# Patient Record
Sex: FEMALE | Race: WHITE | NOT HISPANIC OR LATINO | Employment: FULL TIME | URBAN - METROPOLITAN AREA
[De-identification: names, ages, dates, MRNs, and addresses within clinical notes are randomized per-mention and may not be internally consistent; named-entity substitution may affect disease eponyms.]

---

## 2017-02-06 ENCOUNTER — DOCUMENTATION ONLY (OUTPATIENT)
Dept: FAMILY MEDICINE | Facility: CLINIC | Age: 57
End: 2017-02-06

## 2017-02-06 NOTE — PROGRESS NOTES
Pre-Visit Chart Review  For Appointment Scheduled on 2/7/17    Health Maintenance Due   Topic Date Due    Colonoscopy  08/22/2008    Pap Smear  03/05/2015    Influenza Vaccine  08/01/2016

## 2017-02-07 ENCOUNTER — OFFICE VISIT (OUTPATIENT)
Dept: FAMILY MEDICINE | Facility: CLINIC | Age: 57
End: 2017-02-07
Payer: COMMERCIAL

## 2017-02-07 VITALS
DIASTOLIC BLOOD PRESSURE: 65 MMHG | BODY MASS INDEX: 27.18 KG/M2 | HEIGHT: 64 IN | WEIGHT: 159.19 LBS | SYSTOLIC BLOOD PRESSURE: 109 MMHG | HEART RATE: 79 BPM | TEMPERATURE: 99 F | OXYGEN SATURATION: 98 %

## 2017-02-07 DIAGNOSIS — J02.9 PHARYNGITIS, UNSPECIFIED ETIOLOGY: Primary | ICD-10-CM

## 2017-02-07 LAB
CTP QC/QA: YES
S PYO RRNA THROAT QL PROBE: NEGATIVE

## 2017-02-07 PROCEDURE — 96372 THER/PROPH/DIAG INJ SC/IM: CPT | Mod: S$GLB,,, | Performed by: FAMILY MEDICINE

## 2017-02-07 PROCEDURE — 99213 OFFICE O/P EST LOW 20 MIN: CPT | Mod: 25,S$GLB,, | Performed by: PHYSICIAN ASSISTANT

## 2017-02-07 PROCEDURE — 99999 PR PBB SHADOW E&M-EST. PATIENT-LVL III: CPT | Mod: PBBFAC,,, | Performed by: PHYSICIAN ASSISTANT

## 2017-02-07 RX ORDER — AMOXICILLIN AND CLAVULANATE POTASSIUM 400; 57 MG/5ML; MG/5ML
POWDER, FOR SUSPENSION ORAL
Qty: 200 ML | Refills: 0 | Status: SHIPPED | OUTPATIENT
Start: 2017-02-07 | End: 2017-03-31

## 2017-02-07 RX ORDER — BETAMETHASONE SODIUM PHOSPHATE AND BETAMETHASONE ACETATE 3; 3 MG/ML; MG/ML
6 INJECTION, SUSPENSION INTRA-ARTICULAR; INTRALESIONAL; INTRAMUSCULAR; SOFT TISSUE
Status: COMPLETED | OUTPATIENT
Start: 2017-02-07 | End: 2017-02-07

## 2017-02-07 RX ORDER — AMOXICILLIN AND CLAVULANATE POTASSIUM 875; 125 MG/1; MG/1
1 TABLET, FILM COATED ORAL EVERY 12 HOURS
Qty: 20 TABLET | Refills: 0 | Status: SHIPPED | OUTPATIENT
Start: 2017-02-07 | End: 2017-02-07

## 2017-02-07 RX ADMIN — BETAMETHASONE SODIUM PHOSPHATE AND BETAMETHASONE ACETATE 6 MG: 3; 3 INJECTION, SUSPENSION INTRA-ARTICULAR; INTRALESIONAL; INTRAMUSCULAR; SOFT TISSUE at 11:02

## 2017-02-07 NOTE — PROGRESS NOTES
Subjective:       Patient ID: Lexi Chaney is a 56 y.o. female.    Chief Complaint: sore throat, fever, head congestion    Sore Throat    This is a new problem. The current episode started in the past 7 days. The maximum temperature recorded prior to her arrival was 100.4 - 100.9 F. Associated symptoms include congestion, headaches, a plugged ear sensation, swollen glands and trouble swallowing. Pertinent negatives include no coughing, diarrhea, ear discharge, ear pain, hoarse voice, neck pain, shortness of breath, stridor or vomiting. She has had no exposure to strep or mono. She has tried NSAIDs and acetaminophen for the symptoms. The treatment provided mild relief.     Review of Systems   HENT: Positive for congestion, postnasal drip, sinus pressure, sore throat and trouble swallowing. Negative for ear discharge, ear pain, hoarse voice, rhinorrhea and tinnitus.    Respiratory: Negative for cough, shortness of breath, wheezing and stridor.    Cardiovascular: Negative for chest pain and palpitations.   Gastrointestinal: Negative for diarrhea and vomiting.   Musculoskeletal: Negative for neck pain.   Neurological: Positive for headaches.       Objective:      Physical Exam   Constitutional: Vital signs are normal. She appears well-developed and well-nourished. No distress.   HENT:   Head: Normocephalic and atraumatic.   Right Ear: Hearing, external ear and ear canal normal. Tympanic membrane is bulging. Tympanic membrane mobility is abnormal.   Left Ear: Hearing, external ear and ear canal normal. Tympanic membrane is bulging. Tympanic membrane mobility is abnormal.   Mouth/Throat: Uvula is midline and mucous membranes are normal. Posterior oropharyngeal erythema (mild) present. No oropharyngeal exudate, posterior oropharyngeal edema or tonsillar abscesses.   Cardiovascular: Normal rate, regular rhythm, S1 normal, S2 normal and normal heart sounds.  Exam reveals no gallop.    No murmur heard.  Pulses:        Radial pulses are 2+ on the right side, and 2+ on the left side.   <2sec cap refill fingers bilat     Pulmonary/Chest: Effort normal and breath sounds normal. No respiratory distress. She has no wheezes. She has no rhonchi.   Lymphadenopathy:        Head (right side): Tonsillar adenopathy present. No submental, no submandibular, no preauricular, no posterior auricular and no occipital adenopathy present.        Head (left side): Tonsillar adenopathy present. No submental, no submandibular, no preauricular, no posterior auricular and no occipital adenopathy present.   Skin: Skin is warm and dry. She is not diaphoretic.   Appropriate skin turgor   Psychiatric: She has a normal mood and affect. Her speech is normal and behavior is normal. Judgment and thought content normal. Cognition and memory are normal.       Assessment:       1. Pharyngitis, unspecified etiology        Plan:   Lexi CURTIS was seen today for sore throat, fever, head congestion.    Diagnoses and all orders for this visit:    Pharyngitis, unspecified etiology  -     POCT Rapid Strep A  -     betamethasone acetate-betamethasone sodium phosphate injection 6 mg; Inject 1 mL (6 mg total) into the muscle one time.  -     Discontinue: amoxicillin-clavulanate 875-125mg (AUGMENTIN) 875-125 mg per tablet; Take 1 tablet by mouth every 12 (twelve) hours. Take with food or milk.  -     amoxicillin-clavulanate (AUGMENTIN) 400-57 mg/5 mL SusR; Take 10mL BID x 10 days  Take antibiotics with food.  Increase fluid intake.    Call the clinic if symptoms worsen, new symptoms develop or if you are not any better after completion of your antibiotics.

## 2017-02-07 NOTE — MR AVS SNAPSHOT
[facility]  [address]  [place] [address]  Phone: [phone]  Fax: [phone]                  Lexi Chaney   2017 11:00 AM   Office Visit    Description:  Female : 1960   Provider:  JENNY Cline   Department:  [facility]           Reason for Visit     sore throat, fever, head congestion           Diagnoses this Visit        Comments    Pharyngitis, unspecified etiology    -  Primary            To Do List           Future Appointments        Provider Department Dept Phone    2017 8:30 AM Rajiv Coker DO [facility] [phone]      Goals (5 Years of Data)     None       These Medications        Disp Refills Start End    amoxicillin-clavulanate (AUGMENTIN) 400-57 mg/5 mL SusR 200 mL 0 2017     Take 10mL BID x 10 days    Pharmacy: [facility] [address] [place] [address] AT [facility] Ph #: [phone]       Notes to Pharmacy: Cancel Augmentin tablets.      [facility]     [facility] -  Assistance  Registered nurses in the [facility] Call Center provide clinical advisement, health education, appointment booking, and other advisory services.  Call for this free service at [phone].             Medications           Message regarding Medications     Verify the changes and/or additions to your medication regime listed below are the same as discussed with your clinician today.  If any of these changes or additions are incorrect, please notify your healthcare provider.        START taking these NEW medications        Refills    amoxicillin-clavulanate (AUGMENTIN) 400-57 mg/5 mL SusR 0    Sig: Take 10mL BID x 10 days    Class: Normal      These medications were administered today        Dose Freq    betamethasone acetate-betamethasone sodium phosphate injection 6 mg 6 mg Clinic/HOD 1 time    Sig: Inject 1 mL (6 mg total) into the muscle one time.     "Class: Normal    Route: Intramuscular    Cosign for Ordering: Required by Janae Renner MD           Verify that the below list of medications is an accurate representation of the medications you are currently taking.  If none reported, the list may be blank. If incorrect, please contact your healthcare provider. Carry this list with you in case of emergency.           Current Medications     alprazolam (XANAX) 0.5 MG tablet Take 1 tablet (0.5 mg total) by mouth nightly as needed.    cholecalciferol, vitamin D3, 5,000 unit Tab Take 5,000 Units by mouth every other day.    hyoscyamine (LEVSIN/SL) 0.125 mg Subl DISSOLVE 2 TABLETS UNDER TONGUE EVERY 4 HOURS AS NEEDED    liothyronine (CYTOMEL) 5 MCG Tab Take 1 tablet (5 mcg total) by mouth once daily.    SYNTHROID 125 mcg tablet 1 tablet 6 days a week and 1/2 tablet on day 7    amoxicillin-clavulanate (AUGMENTIN) 400-57 mg/5 mL SusR Take 10mL BID x 10 days           Clinical Reference Information           Your Vitals Were     BP Pulse Temp Height Weight SpO2    109/65 (BP Location: Right arm, Patient Position: Sitting, BP Method: Automatic) 79 98.7 °F (37.1 °C) (Oral) 5' 4" (1.626 m) 72.2 kg (159 lb 2.8 oz) 98%    BMI                27.32 kg/m2          Blood Pressure          Most Recent Value    BP  109/65      Allergies as of 2/7/2017     Sulfa (Sulfonamide Antibiotics)      Immunizations Administered on Date of Encounter - 2/7/2017     None      Orders Placed During Today's Visit      Normal Orders This Visit    POCT Rapid Strep A       Administrations This Visit     betamethasone acetate-betamethasone sodium phosphate injection 6 mg     Admin Date Action Dose Route Administered By             02/07/2017 Given 6 mg Intramuscular Sharon Cooley LPN                      Language Assistance Services     ATTENTION: Language assistance services are available, free of charge. Please call [phone].      ATENCIÓN: Si amy freeman a noonan disposición " servicios gratuitos de asistencia lingüística. Claudine dennis [phone].     SCOTTY Ý: N?u b?n nói Ti?ng Vi?t, có các d?ch v? h? tr? ngôn ng? mi?n phí dành cho b?n. G?i s? [phone].         [facility] complies with applicable Federal civil rights laws and does not discriminate on the basis of race, color, national origin, age, disability, or sex.

## 2017-03-17 ENCOUNTER — TELEPHONE (OUTPATIENT)
Dept: OPHTHALMOLOGY | Facility: CLINIC | Age: 57
End: 2017-03-17

## 2017-03-17 NOTE — TELEPHONE ENCOUNTER
----- Message from Varinder Higgins sent at 3/16/2017  4:02 PM CDT -----  Contact: Lexi Chaney [[id number]]  Pt wants to make appt for YAG but has questions for nurse prior to doing so,please call back [phone],thanks

## 2017-03-31 ENCOUNTER — OFFICE VISIT (OUTPATIENT)
Dept: OPHTHALMOLOGY | Facility: CLINIC | Age: 57
End: 2017-03-31
Payer: COMMERCIAL

## 2017-03-31 ENCOUNTER — TELEPHONE (OUTPATIENT)
Dept: ENDOCRINOLOGY | Facility: CLINIC | Age: 57
End: 2017-03-31

## 2017-03-31 DIAGNOSIS — E03.9 HYPOTHYROIDISM, UNSPECIFIED TYPE: Primary | ICD-10-CM

## 2017-03-31 DIAGNOSIS — E05.00 GRAVES DISEASE: ICD-10-CM

## 2017-03-31 DIAGNOSIS — E55.9 VITAMIN D DEFICIENCY: ICD-10-CM

## 2017-03-31 DIAGNOSIS — H26.493 POSTERIOR CAPSULAR OPACIFICATION, BILATERAL: Primary | ICD-10-CM

## 2017-03-31 PROCEDURE — 99999 PR PBB SHADOW E&M-EST. PATIENT-LVL II: CPT | Mod: PBBFAC,,, | Performed by: OPHTHALMOLOGY

## 2017-03-31 PROCEDURE — 92012 INTRM OPH EXAM EST PATIENT: CPT | Mod: 57,S$GLB,, | Performed by: OPHTHALMOLOGY

## 2017-03-31 PROCEDURE — 66821 AFTER CATARACT LASER SURGERY: CPT | Mod: RT,S$GLB,, | Performed by: OPHTHALMOLOGY

## 2017-03-31 NOTE — MR AVS SNAPSHOT
[facility]  [address]  [facility] [address]  Phone: [phone]  Fax: [phone]                  Lexi Chaney   3/31/2017 1:30 PM   Office Visit    Description:  Female : 1960   Provider:  Niall Barajas MD   Department:  [facility]           Reason for Visit     Eye Problem           Diagnoses this Visit        Comments    Posterior capsular opacification, bilateral    -  Primary            To Do List           Future Appointments        Provider Department Dept Phone    2017 8:30 AM Rajiv Coker DO [facility] [phone]      Goals (5 Years of Data)     None      [facility]     [facility] On Call Nurse Care Line -  Assistance  Unless otherwise directed by your provider, please contact [facility], our nurse care line that is available for  assistance.     Registered nurses in the [facility] Center provide: appointment scheduling, clinical advisement, health education, and other advisory services.  Call: [phone] (toll free)               Medications           Message regarding Medications     Verify the changes and/or additions to your medication regime listed below are the same as discussed with your clinician today.  If any of these changes or additions are incorrect, please notify your healthcare provider.        STOP taking these medications     amoxicillin-clavulanate (AUGMENTIN) 400-57 mg/5 mL SusR Take 10mL BID x 10 days    hyoscyamine (LEVSIN/SL) 0.125 mg Subl DISSOLVE 2 TABLETS UNDER TONGUE EVERY 4 HOURS AS NEEDED           Verify that the below list of medications is an accurate representation of the medications you are currently taking.  If none reported, the list may be blank. If incorrect, please contact your healthcare provider. Carry this list with you in case of emergency.           Current Medications     alprazolam (XANAX) 0.5 MG tablet Take 1 tablet (0.5 mg total) by mouth nightly as needed.     cholecalciferol, vitamin D3, 5,000 unit Tab Take 5,000 Units by mouth every other day.    liothyronine (CYTOMEL) 5 MCG Tab Take 1 tablet (5 mcg total) by mouth once daily.    SYNTHROID 125 mcg tablet 1 tablet 6 days a week and 1/2 tablet on day 7           Clinical Reference Information           Allergies as of 3/31/2017     Sulfa (Sulfonamide Antibiotics)      Immunizations Administered on Date of Encounter - 3/31/2017     None      Language Assistance Services     ATTENTION: Language assistance services are available, free of charge. Please call [phone].      ATENCIÓN: Si habla gregg, tiene a noonan disposición servicios gratuitos de asistencia lingüística. Llame al [phone].     SCOTTY Ý: N?u b?n nói Ti?ng Vi?t, có các d?ch v? h? tr? ngôn ng? mi?n phí dành cho b?n. G?i s? [phone].         [facility] complies with applicable Federal civil rights laws and does not discriminate on the basis of race, color, national origin, age, disability, or sex.

## 2017-03-31 NOTE — TELEPHONE ENCOUNTER
----- Message from Agueda Goldstein sent at 3/31/2017 11:03 AM CDT -----  Contact: self  Patient has an appointment 4/6/17 for a follow with labs  There are no lab orders in chart   Please call  to advise.    Thanks

## 2017-03-31 NOTE — PROGRESS NOTES
HPI     Eye Problem    Additional comments: Both Eyes.            Comments   55 y/o female returns for care.  Pt saw Dr Barajas and he recommended YAG.    Pt hesitant as she states she has large floaters OS and if the laser   creates anymore she would be blind.  Thought they may go away with time   but have persisted.   No Floaters OD.  Just finds vision OD blurred.     Genteal qd OU        Last edited by Carmen Rivera on 3/31/2017  1:30 PM. (History)            Assessment /Plan     For exam results, see Encounter Report.    Posterior capsular opacification, bilateral      Visually significant posterior capsular opacity present.  Discussed risks, benefits, and alternatives to laser surgery.  YAG laser capsulotomy Procedure Note:   Informed consent obtained and correct eye(s) verified with patient.  1 drop of topical Proparacaine and Iopidine instilled, and eye(s) dilated with 1% Tropicamide 2.5% Phenylephrine.  YAG laser applied to posterior capsule in cruciate pattern OD  Patient tolerated procedure well. No complications. Follow up in 1 month/PRN.

## 2017-04-01 ENCOUNTER — LAB VISIT (OUTPATIENT)
Dept: LAB | Facility: HOSPITAL | Age: 57
End: 2017-04-01
Attending: INTERNAL MEDICINE
Payer: COMMERCIAL

## 2017-04-01 DIAGNOSIS — E05.00 GRAVES DISEASE: ICD-10-CM

## 2017-04-01 DIAGNOSIS — E03.9 HYPOTHYROIDISM, UNSPECIFIED TYPE: ICD-10-CM

## 2017-04-01 DIAGNOSIS — E55.9 VITAMIN D DEFICIENCY: ICD-10-CM

## 2017-04-01 LAB
T3 SERPL-MCNC: 122 NG/DL
T4 FREE SERPL-MCNC: 1.3 NG/DL
TSH SERPL DL<=0.005 MIU/L-ACNC: 1.16 UIU/ML

## 2017-04-01 PROCEDURE — 84480 ASSAY TRIIODOTHYRONINE (T3): CPT

## 2017-04-01 PROCEDURE — 36415 COLL VENOUS BLD VENIPUNCTURE: CPT | Mod: PO

## 2017-04-01 PROCEDURE — 84443 ASSAY THYROID STIM HORMONE: CPT

## 2017-04-01 PROCEDURE — 82306 VITAMIN D 25 HYDROXY: CPT

## 2017-04-01 PROCEDURE — 84439 ASSAY OF FREE THYROXINE: CPT

## 2017-04-03 ENCOUNTER — PATIENT MESSAGE (OUTPATIENT)
Dept: ENDOCRINOLOGY | Facility: CLINIC | Age: 57
End: 2017-04-03

## 2017-04-04 LAB — 25(OH)D3+25(OH)D2 SERPL-MCNC: 31 NG/ML

## 2017-04-06 ENCOUNTER — OFFICE VISIT (OUTPATIENT)
Dept: ENDOCRINOLOGY | Facility: CLINIC | Age: 57
End: 2017-04-06
Payer: COMMERCIAL

## 2017-04-06 VITALS
BODY MASS INDEX: 27.67 KG/M2 | DIASTOLIC BLOOD PRESSURE: 76 MMHG | HEIGHT: 64 IN | SYSTOLIC BLOOD PRESSURE: 132 MMHG | WEIGHT: 162.06 LBS | HEART RATE: 89 BPM

## 2017-04-06 DIAGNOSIS — E55.9 VITAMIN D DEFICIENCY: ICD-10-CM

## 2017-04-06 DIAGNOSIS — E03.9 HYPOTHYROIDISM, UNSPECIFIED TYPE: Primary | ICD-10-CM

## 2017-04-06 DIAGNOSIS — E05.00 GRAVES' EYE DISEASE: ICD-10-CM

## 2017-04-06 PROCEDURE — 99999 PR PBB SHADOW E&M-EST. PATIENT-LVL II: CPT | Mod: PBBFAC,,, | Performed by: INTERNAL MEDICINE

## 2017-04-06 PROCEDURE — 99214 OFFICE O/P EST MOD 30 MIN: CPT | Mod: S$GLB,,, | Performed by: INTERNAL MEDICINE

## 2017-04-06 PROCEDURE — 1160F RVW MEDS BY RX/DR IN RCRD: CPT | Mod: S$GLB,,, | Performed by: INTERNAL MEDICINE

## 2017-04-06 NOTE — PROGRESS NOTES
CHIEF COMPLAINT: Hypothyroidism/status post Graves  56-year-old female here for followup. Currently on Synthroid 125 mcg 6 days a week and 1/2 tablet on day 7 and Cytomel 5 daily. She is taking Vit D. Palpitations resolved. Her mother has passed away. No diarrhea or constipation. No hair loss or dry skin.           PAST MEDICAL HISTORY: Graves treated with radioactive iodine in 2000. Also had Graves eye disease    PAST SURGICAL HISTORY: 3 surgeries for Graves eye disease, tonsillectomy, lithotripsy    SOCIAL HISTORY: Does not smoke or drink    FAMILY HISTORY: Hypothyroidism, heart disease, diabetes. Incidentally  had Graves' disease as well    MEDICATIONS/ALLERGIES: The patient's MedCard has been updated and reviewed.         ROS:   Constitutional: weight decreased   Eyes: Scheduled for YAG for Dr. Niall Barajas  ENT: No dysphagia  Cardiovascular: Denies current anginal symptoms  Respiratory: Denies current respiratory difficulty  Gastrointestinal: Denies recent bowel disturbances  GenitoUrinary - No dysuria  Skin: No new skin rash  Neurologic: No focal neurologic complaints     PE:  GENERAL: Well developed, well nourished  LYMPHATIC: No cervical or supraclavicular lymphadenopathy  NECK. No palpable thyroid nodules.   CARDIOVASCULAR: Normal heart sounds, no pedal edema  RESPIRATORY: Normal effort, clear to auscultation    Results for LUKE ACOSTA (MRN [id number]) as of 4/6/2017 09:01   Ref. Range 4/1/2017 11:45   Vit D, 25-Hydroxy Latest Ref Range: 30 - 96 ng/mL 31   TSH Latest Ref Range: 0.400 - 4.000 uIU/mL 1.159   T3, Total Latest Ref Range: 60 - 180 ng/dL 122   Free T4 Latest Ref Range: 0.71 - 1.51 ng/dL 1.30         ASSESSMENT/PLAN:  1. Hypothyroidism- status post treatment with I-131 for Graves' disease. TFT WNL. Will continue current Tx. Symptomatically doing better.     2. Graves' eye disease- seeing ophthalmology    3. Vitamin D deficiency- continue current dose of Vit D    FOLLOWUP:  F/U 1 year with  TSH, Ft4, Ft3, Vit D

## 2017-05-05 DIAGNOSIS — R10.11 RIGHT UPPER QUADRANT PAIN: Primary | ICD-10-CM

## 2017-05-10 RX ORDER — LEVOTHYROXINE SODIUM 125 UG/1
TABLET ORAL
Qty: 30 TABLET | Refills: 0 | Status: SHIPPED | OUTPATIENT
Start: 2017-05-10 | End: 2017-06-14 | Stop reason: SDUPTHER

## 2017-05-12 ENCOUNTER — HOSPITAL ENCOUNTER (OUTPATIENT)
Dept: RADIOLOGY | Facility: HOSPITAL | Age: 57
Discharge: HOME | End: 2017-05-12
Attending: INTERNAL MEDICINE
Payer: COMMERCIAL

## 2017-05-12 DIAGNOSIS — R10.11 RIGHT UPPER QUADRANT PAIN: ICD-10-CM

## 2017-05-12 PROCEDURE — 76700 US EXAM ABDOM COMPLETE: CPT | Mod: TC

## 2017-05-12 PROCEDURE — 76700 US EXAM ABDOM COMPLETE: CPT | Mod: 26,,, | Performed by: RADIOLOGY

## 2017-05-29 ENCOUNTER — HOSPITAL ENCOUNTER (EMERGENCY)
Facility: HOSPITAL | Age: 57
Discharge: HOME | End: 2017-05-29
Attending: EMERGENCY MEDICINE
Payer: COMMERCIAL

## 2017-05-29 VITALS
BODY MASS INDEX: 27.85 KG/M2 | WEIGHT: 163.13 LBS | TEMPERATURE: 98 F | HEIGHT: 64 IN | RESPIRATION RATE: 16 BRPM | SYSTOLIC BLOOD PRESSURE: 140 MMHG | OXYGEN SATURATION: 98 % | DIASTOLIC BLOOD PRESSURE: 67 MMHG | HEART RATE: 61 BPM

## 2017-05-29 DIAGNOSIS — M25.512 CHRONIC LEFT SHOULDER PAIN: Primary | ICD-10-CM

## 2017-05-29 DIAGNOSIS — G89.29 CHRONIC LEFT SHOULDER PAIN: Primary | ICD-10-CM

## 2017-05-29 PROCEDURE — 63600175 PHARM REV CODE 636 W HCPCS: Performed by: EMERGENCY MEDICINE

## 2017-05-29 PROCEDURE — 99283 EMERGENCY DEPT VISIT LOW MDM: CPT | Mod: 25

## 2017-05-29 PROCEDURE — 96372 THER/PROPH/DIAG INJ SC/IM: CPT

## 2017-05-29 RX ORDER — KETOROLAC TROMETHAMINE 30 MG/ML
30 INJECTION, SOLUTION INTRAMUSCULAR; INTRAVENOUS
Status: COMPLETED | OUTPATIENT
Start: 2017-05-29 | End: 2017-05-29

## 2017-05-29 RX ORDER — KETOROLAC TROMETHAMINE 30 MG/ML
60 INJECTION, SOLUTION INTRAMUSCULAR; INTRAVENOUS
Status: DISCONTINUED | OUTPATIENT
Start: 2017-05-29 | End: 2017-05-29

## 2017-05-29 RX ORDER — HYDROCODONE BITARTRATE AND ACETAMINOPHEN 5; 325 MG/1; MG/1
1 TABLET ORAL EVERY 8 HOURS PRN
Qty: 9 TABLET | Refills: 0 | Status: SHIPPED | OUTPATIENT
Start: 2017-05-29 | End: 2017-06-01

## 2017-05-29 RX ADMIN — KETOROLAC TROMETHAMINE 30 MG: 30 INJECTION, SOLUTION INTRAMUSCULAR at 11:05

## 2017-05-30 ENCOUNTER — TELEPHONE (OUTPATIENT)
Dept: ORTHOPEDICS | Facility: CLINIC | Age: 57
End: 2017-05-30

## 2017-05-30 NOTE — ED NOTES
Upon discharge, patient is AAOx4, no cardiac or respiratory complications. Follow up care and  Medications have been reviewed with patient and has been instructed to return to the ER if needed. Patient verbalized understanding and ambulated to the lobby without difficulty. FABIÁN HARRISON.

## 2017-05-30 NOTE — ED PROVIDER NOTES
"Encounter Date: 5/29/2017    SCRIBE #1 NOTE: Luna CERON, am scribing for, and in the presence of, Dr. Roper.       History     Chief Complaint   Patient presents with    Shoulder Pain     Chronic condition. Gets injections in it about once a year and has an appointment on Wednesday but pain too bad to wait.      Review of patient's allergies indicates:   Allergen Reactions    Sulfa (sulfonamide antibiotics) Itching     5/29/2017  10:40 PM     Chief Complaint: Shoulder pain    The patient is a 56 y.o. female with PMHx of Grave's disease, nephrolithiasis, IBS, thyroid disease, internal hemorrhoids, vitamin D deficiency and stated hx of rotator cuff impingement syndrome who presents with shoulder pain. The patient c/o gradual onset of chronic achy, left shoulder pain with mild swelling which started a couple of days. Pt reports she has "flares" of her pain once a year due to rotator cuff impingement syndrome. She took 50 mg tramadol which gave temporary relief. Pt states she has had IM toradol in the past which worked well for her. Pt does not wear sling daily but wore one today to help with pain. Patient also reports she receives injections in the shoulder for her pain. She has an appointment on Wednesday at Dr. Howard's office but cannot wait due to progressively worsening pain. Shx of eye surgery, DEXA, lithotripsy, upper GI endoscopy and colonoscopy.      The history is provided by the patient.     Past Medical History:   Diagnosis Date    Abnormal Pap smear     Arthritis     Cataract     Grave's disease     Grave's disease     IBS (irritable bowel syndrome)     rare    Internal hemorrhoids     followed by Dr. Schilling    Nephrolithiasis     followed by Dr. Pope    Thyroid disease     Vitamin D deficiency      Past Surgical History:   Procedure Laterality Date    CATARACT EXTRACTION  11/19/12    right eye (toric)    CATARACT EXTRACTION W/  INTRAOCULAR LENS IMPLANT  11/26/12    left eye (toric) "    COLONOSCOPY  ~2005  [facility]    Repeat in 10 years.    DEXA   2010    NORMAL    EXTRACORPOREAL SHOCK WAVE LITHOTRIPSY      EYE SURGERY      tonsillectomy      UPPER GASTROINTESTINAL ENDOSCOPY  12/3/2003  [facility]     Family History   Problem Relation Age of Onset    Hypertension Father     Stroke Father     Heart disease Mother     Hypertension Mother     Heart disease Brother     Hypertension Brother     Diabetes Brother     Amblyopia Neg Hx     Blindness Neg Hx     Cancer Neg Hx     Cataracts Neg Hx     Glaucoma Neg Hx     Macular degeneration Neg Hx     Retinal detachment Neg Hx     Strabismus Neg Hx     Thyroid disease Neg Hx     Anesthesia problems Neg Hx     Clotting disorder Neg Hx      Social History   Substance Use Topics    Smoking status: Never Smoker    Smokeless tobacco: Never Used    Alcohol use Yes      Comment: q month     Review of Systems   Constitutional: Negative for fever.   Respiratory: Negative for shortness of breath.    Cardiovascular: Negative for chest pain.   Musculoskeletal: Positive for arthralgias. Negative for joint swelling.   Skin: Negative for rash.   Neurological: Negative for weakness.       Physical Exam     Initial Vitals [05/29/17 2223]   BP Pulse Resp Temp SpO2   (!) 140/67 61 16 98.1 °F (36.7 °C) 98 %     Physical Exam    Nursing note and vitals reviewed.  Constitutional: She appears well-nourished. No distress.   HENT:   Head: Normocephalic and atraumatic.   Eyes: Conjunctivae and EOM are normal.   Neck: Normal range of motion. Neck supple.   Pulmonary/Chest: Breath sounds normal. No respiratory distress.   No anterior chest wall pain   Musculoskeletal: She exhibits tenderness. She exhibits no edema.   Exquisite tenderness with palpation over the superior left shoulder, no overlying skin changes, range of motion inhibited by pain, no frozen shoulder, neurovascularly intact left upper extremity, soft compartments         ED Course    Procedures  Labs Reviewed - No data to display          Medical Decision Making:   Initial Assessment:   Currently the patient is just requesting pain medication to bridge her to see her orthopedist within 36 hours.  She does report improvement with Toradol the past and will be provided intramuscular Toradol here.  ED Management:  Patient will additionally provided a short course of Norco for breakthrough pain.  I do not think additional imaging are warranted at this time, as there is no new injury.  Patient is asked to follow-up or to return to the ER for any new, concerning, or worsening symptoms, including severe pain.  Patient agreeable with this plan follow-up and she was discharged in stable condition.            Scribe Attestation:   Scribe #1: I performed the above scribed service and the documentation accurately describes the services I performed. I attest to the accuracy of the note.    Attending Attestation:           Physician Attestation for Scribe:  Physician Attestation Statement for Scribe #1: I, Luna Fajardo, reviewed documentation, as scribed by Dr MONET Roper  in my presence, and it is both accurate and complete.                 ED Course     Clinical Impression:   The encounter diagnosis was Chronic left shoulder pain.    Disposition:   Disposition: Discharged  Condition: Stable       Pramod Roper MD  05/29/17 6301       Pramod Roper MD  05/29/17 2413

## 2017-05-30 NOTE — ED NOTES
"Presents to the ER with c/o chronic left shoulder pain. Patient reports that she get "Injections in my shoulder, I have an appointment on Wednesday, but the pain is too bad." Limited movement to left arm that is secondary to pain. Patient denies any recent fall or trauma.   "

## 2017-05-30 NOTE — TELEPHONE ENCOUNTER
----- Message from David SY Frisard sent at 5/30/2017  7:49 AM CDT -----  Contact: same  Patient called in and wanted to see if she could be seen earlier tomorrow Wednesday 5/31?  Patient has a 3pm appt scheduled already but stated she is in a lot of pain.    Patient call back number is [phone]

## 2017-05-31 ENCOUNTER — OFFICE VISIT (OUTPATIENT)
Dept: ORTHOPEDICS | Facility: CLINIC | Age: 57
End: 2017-05-31
Payer: COMMERCIAL

## 2017-05-31 VITALS — WEIGHT: 163 LBS | HEIGHT: 64 IN | BODY MASS INDEX: 27.83 KG/M2

## 2017-05-31 DIAGNOSIS — M75.82 ROTATOR CUFF TENDINITIS, LEFT: Primary | ICD-10-CM

## 2017-05-31 PROCEDURE — 20611 DRAIN/INJ JOINT/BURSA W/US: CPT | Mod: LT,S$GLB,, | Performed by: ORTHOPAEDIC SURGERY

## 2017-05-31 PROCEDURE — 99214 OFFICE O/P EST MOD 30 MIN: CPT | Mod: 25,S$GLB,, | Performed by: ORTHOPAEDIC SURGERY

## 2017-05-31 PROCEDURE — 99999 PR PBB SHADOW E&M-EST. PATIENT-LVL II: CPT | Mod: PBBFAC,,, | Performed by: ORTHOPAEDIC SURGERY

## 2017-05-31 RX ORDER — TRIAMCINOLONE ACETONIDE 40 MG/ML
40 INJECTION, SUSPENSION INTRA-ARTICULAR; INTRAMUSCULAR
Status: DISCONTINUED | OUTPATIENT
Start: 2017-05-31 | End: 2017-05-31 | Stop reason: HOSPADM

## 2017-05-31 RX ADMIN — TRIAMCINOLONE ACETONIDE 40 MG: 40 INJECTION, SUSPENSION INTRA-ARTICULAR; INTRAMUSCULAR at 10:05

## 2017-05-31 NOTE — PROCEDURES
Large Joint Aspiration/Injection  Date/Time: 5/31/2017 10:15 AM  Performed by: ODILIA HENRY  Authorized by: ODILIA HENRY     Consent Done?:  Yes (Verbal)  Indications:  Pain  Timeout: Prior to procedure the correct patient, procedure, and site was verified      Location:  Shoulder  Site:  L subacromial bursa  Prep: Patient was prepped and draped in usual sterile fashion    Ultrasonic Guidance for needle placement: Yes  Images are saved and documented.  Needle size:  25 G  Approach:  Lateral  Medications:  40 mg triamcinolone acetonide 40 mg/mL  Patient tolerance:  Patient tolerated the procedure well with no immediate complications

## 2017-05-31 NOTE — PROGRESS NOTES
"DATE: 5/31/2017  PATIENT: Lexi Chaney    Attending Physician: Zev Howard M.D.    HISTORY:  Lexi Chaney is a 56 y.o. female who returns for follow up evaluation of  her left shoulder.  She is seen last year and underwent cortisone injection for rotator cuff tendinitis.  Subsequent to that she saw Dr. Valerio who ordered an MRI which revealed Rotator cuff tendinosis but tear.  She states she was doing fairly well until approximately 2 weeks ago when she started developing severe pain.  She's been using the sling.  She now returns requesting cortisone injection.  She denies any numbness or tingling to the left upper extremity.  Pain is reported at 9/10 today.    PMH/PSH/FamHx/SocHx:  Reviewed and unchanged from prior visit    ROS:  Constitution: Negative for chills, fever, and sweats. Negative for unexplained weight loss.  HENT: Negative for headaches and blurry vision.   Cardiovascular: Negative for chest pain, irregular heartbeat, leg swelling and palpitations.   Respiratory: Negative for cough and shortness of breath.   Gastrointestinal: Negative for abdominal pain, heartburn, nausea and vomiting.   Genitourinary: Negative for bladder incontinence and dysuria.   Musculoskeletal: Negative for systemic arthritis, joint swelling, muscle weakness and myalgias.   Neurological: Negative for numbness.   Psychiatric/Behavioral: Negative for depression.   Endocrine: Negative for polyuria.   Hematologic/Lymphatic: Negative for bleeding disorders.  Skin: Negative for poor wound healing.       EXAM:  Ht 5' 4" (1.626 m)   Wt 73.9 kg (163 lb)   BMI 27.98 kg/m²   Healthy-appearing female in mild discomfort.  Sling in place to the left upper extremity.  Sensation is intact the lateral upper arm lateral forearm.  Range of motion is significantly limited due to pain.  Motor strength difficult to examine but appears 5/5 in the supraspinatous and external rotators.  Markedly impingement sign on exam    IMAGING:   No " x-rays are performed today.  The MRI is personally reviewed and consistent with a report.  Tendinosis without tear identified.    ASSESSMENT:  Partial-thickness rotator cuff tear left shoulder    PLAN:  The implications of the patient's evolution of symptoms and findings were explained to the patient and her  in detail.  Given options discussed included continued observation, repeat cortisone injection, physical therapy, and arthroscopy.. All questions answered and the patient wishes to proceed with cortisone injection (performed today, see procedure note).  Additionally, we will start her on physical therapy next week for gentle range of motion strengthening.  She'll monitor her symptoms over the next 2-3 weeks.  Should she continue to remain significant symptomatic, she'll contact the office for consideration of arthroscopy..          This note was dictated using voice recognition software and may contain grammatical errors

## 2017-06-15 RX ORDER — LEVOTHYROXINE SODIUM 125 UG/1
TABLET ORAL
Qty: 30 TABLET | Refills: 3 | Status: SHIPPED | OUTPATIENT
Start: 2017-06-15 | End: 2017-10-09 | Stop reason: SDUPTHER

## 2017-08-14 ENCOUNTER — OFFICE VISIT (OUTPATIENT)
Dept: OBSTETRICS AND GYNECOLOGY | Facility: CLINIC | Age: 57
End: 2017-08-14
Payer: COMMERCIAL

## 2017-08-14 VITALS
BODY MASS INDEX: 27.48 KG/M2 | HEIGHT: 64 IN | WEIGHT: 160.94 LBS | SYSTOLIC BLOOD PRESSURE: 122 MMHG | DIASTOLIC BLOOD PRESSURE: 70 MMHG

## 2017-08-14 DIAGNOSIS — Z01.419 GYNECOLOGIC EXAM NORMAL: Primary | ICD-10-CM

## 2017-08-14 DIAGNOSIS — D25.9 UTERINE LEIOMYOMA, UNSPECIFIED LOCATION: ICD-10-CM

## 2017-08-14 PROCEDURE — 3008F BODY MASS INDEX DOCD: CPT | Mod: S$GLB,,, | Performed by: SPECIALIST

## 2017-08-14 PROCEDURE — 88175 CYTOPATH C/V AUTO FLUID REDO: CPT

## 2017-08-14 PROCEDURE — 87624 HPV HI-RISK TYP POOLED RSLT: CPT

## 2017-08-14 PROCEDURE — 99396 PREV VISIT EST AGE 40-64: CPT | Mod: S$GLB,,, | Performed by: SPECIALIST

## 2017-08-14 PROCEDURE — 99999 PR PBB SHADOW E&M-EST. PATIENT-LVL III: CPT | Mod: PBBFAC,,, | Performed by: SPECIALIST

## 2017-08-14 RX ORDER — DICYCLOMINE HYDROCHLORIDE 20 MG/1
20 TABLET ORAL EVERY 6 HOURS
COMMUNITY
End: 2018-06-29

## 2017-08-14 NOTE — PROGRESS NOTES
57 yo WF presents for annual gyn eval. Pt states hx of asymptomatic uterine fibroids. Pt last mammo screening Dec and WNl. Pt denies pain, weight loss/gain, PMB, dysuria or d/c.  Past Medical History:   Diagnosis Date    Abnormal Pap smear     Arthritis     Cataract     Grave's disease     Grave's disease     IBS (irritable bowel syndrome)     rare    Internal hemorrhoids     followed by Dr. Schilling    Nephrolithiasis     followed by Dr. Pope    Thyroid disease     Vitamin D deficiency        Past Surgical History:   Procedure Laterality Date    CATARACT EXTRACTION  11/19/12    right eye (toric)    CATARACT EXTRACTION W/  INTRAOCULAR LENS IMPLANT  11/26/12    left eye (toric)    COLONOSCOPY  ~2005  [place]    Repeat in 10 years.    DEXA   2010    NORMAL    EXTRACORPOREAL SHOCK WAVE LITHOTRIPSY      EYE SURGERY      tonsillectomy      UPPER GASTROINTESTINAL ENDOS[facility]  [facility]       Family History   Problem Relation Age of Onset    Hypertension Father     Stroke Father     Heart disease Mother     Hypertension Mother     Heart disease Brother     Hypertension Brother     Diabetes Brother     Amblyopia Neg Hx     Blindness Neg Hx     Cancer Neg Hx     Cataracts Neg Hx     Glaucoma Neg Hx     Macular degeneration Neg Hx     Retinal detachment Neg Hx     Strabismus Neg Hx     Thyroid disease Neg Hx     Anesthesia problems Neg Hx     Clotting disorder Neg Hx        Social History     Social History    Marital status:      Spouse name: N/A    Number of children: 2    Years of education: N/A     Occupational History     [employer]     Social History Main Topics    Smoking status: Never Smoker    Smokeless tobacco: Never Used    Alcohol use Yes      Comment: q month    Drug use: No    Sexual activity: Yes     Partners: Male     Birth control/ protection: Post-menopausal     Other Topics Concern    None     Social History Narrative     None       Current Outpatient Prescriptions   Medication Sig Dispense Refill    alprazolam (XANAX) 0.5 MG tablet Take 1 tablet (0.5 mg total) by mouth nightly as needed. 90 tablet 0    cholecalciferol, vitamin D3, 5,000 unit Tab Take 5,000 Units by mouth every other day.      dicyclomine (BENTYL) 20 mg tablet Take 20 mg by mouth every 6 (six) hours.      liothyronine (CYTOMEL) 5 MCG Tab Take 1 tablet (5 mcg total) by mouth once daily. 90 tablet 3    SYNTHROID 125 mcg tablet 1 tablet 6 days a week and 1/2 tablet on day 7 90 tablet 3    SYNTHROID 125 mcg tablet TAKE 1 TABLET BY MOUTH DAILY 6 DAYS A WEEK AND 1/2 TABLET ON THE SEVENTH 30 tablet 3     No current facility-administered medications for this visit.        Review of patient's allergies indicates:   Allergen Reactions    Codeine Nausea And Vomiting    Sulfa (sulfonamide antibiotics) Itching       Review of System:   General: no chills, fever, night sweats, weight gain or weight loss  Psychological: no depression or suicidal ideation  Breasts: no new or changing breast lumps, nipple discharge or masses.  Respiratory: no cough, shortness of breath, or wheezing  Cardiovascular: no chest pain or dyspnea on exertion  Gastrointestinal: no abdominal pain, change in bowel habits, or black or bloody stools  Genito-Urinary: no incontinence, urinary frequency/urgency or vulvar/vaginal symptoms, pelvic pain or abnormal vaginal bleeding.  Musculoskeletal: no gait disturbance or muscular weakness    General Appearance:  Alert, cooperative, no distress, appears stated age   Head:  Normocephalic, without obvious abnormality, atraumatic   Eyes:  PERRL, conjunctiva/corneas clear, EOM's intact, fundi benign, both eyes   Ears:  Normal TM's and external ear canals, both ears   Nose: Nares normal, septum midline,mucosa normal, no drainage or sinus tenderness   Throat: Lips, mucosa, and tongue normal; teeth and gums normal   Neck: Supple, symmetrical, trachea midline, no  adenopathy;  thyroid: not enlarged, symmetric, no tenderness/mass/nodules; no carotid bruit or JVD   Back:   Symmetric, no curvature, ROM normal, no CVA tenderness   Lungs:   Clear to auscultation bilaterally, respirations unlabored   Breasts:  No masses or tenderness   Heart:  Regular rate and rhythm, S1 and S2 normal, no murmur, rub, or gallop   Abdomen:   Soft, non-tender, bowel sounds active all four quadrants,  no masses, no organomegaly    Genitourinary:   External rectal exam shows no thrombosed external hemorrhoids.   Pelvic exam was performed with patient supine.  No labial fusion.  There is no rash, lesion or injury on the right labia.  There is no rash, lesion or injury on the left labia.  No bleeding and no signs of injury around the vaginal introitus, urethra is without lesions and well supported. The cervix is visualized with no discharge, lesions or friability.  No vaginal discharge found.  No significant Cystocele, Enterocele or rectocele, and uterus well supported.  Bimanual exam:  The urethra is normal to palpation and there are no palpable vaginal wall masses.  Uterus is not deviated, slightly enlarged.  Cervix exhibits no motion tenderness.   Right adnexum displays no mass and no tenderness.  Left adnexum displays no mass and no tenderness.   Extremities: Extremities normal, atraumatic, no cyanosis or edema   Pulses: 2+ and symmetric   Skin: Skin color, texture, turgor normal, no rashes or lesions   Lymph nodes: Cervical, supraclavicular, and axillary nodes normal   Neurologic: Normal     PAP submitted    Plan I recommend pelvic u/s for baseline          Will follow result          BSE monthly          RTO 1 year with annual pelvic u/s

## 2017-08-17 LAB
HPV HR 12 DNA CVX QL NAA+PROBE: NEGATIVE
HPV16 DNA SPEC QL NAA+PROBE: NEGATIVE
HPV18 DNA SPEC QL NAA+PROBE: NEGATIVE

## 2017-08-24 DIAGNOSIS — Z12.11 COLON CANCER SCREENING: ICD-10-CM

## 2017-09-16 ENCOUNTER — HOSPITAL ENCOUNTER (OUTPATIENT)
Dept: RADIOLOGY | Facility: HOSPITAL | Age: 57
Discharge: HOME | End: 2017-09-16
Attending: SPECIALIST
Payer: COMMERCIAL

## 2017-09-16 DIAGNOSIS — D25.9 UTERINE LEIOMYOMA, UNSPECIFIED LOCATION: ICD-10-CM

## 2017-09-22 ENCOUNTER — OFFICE VISIT (OUTPATIENT)
Dept: OPHTHALMOLOGY | Facility: CLINIC | Age: 57
End: 2017-09-22
Payer: COMMERCIAL

## 2017-09-22 DIAGNOSIS — H26.493 POSTERIOR CAPSULAR OPACIFICATION, BILATERAL: Primary | ICD-10-CM

## 2017-09-22 PROCEDURE — 99999 PR PBB SHADOW E&M-EST. PATIENT-LVL II: CPT | Mod: PBBFAC,,, | Performed by: OPHTHALMOLOGY

## 2017-09-22 PROCEDURE — 99499 UNLISTED E&M SERVICE: CPT | Mod: S$GLB,,, | Performed by: OPHTHALMOLOGY

## 2017-09-22 PROCEDURE — 66821 AFTER CATARACT LASER SURGERY: CPT | Mod: LT,S$GLB,, | Performed by: OPHTHALMOLOGY

## 2017-09-22 NOTE — PROGRESS NOTES
HPI     Patient here for YAG OS  S/P YAG OD 03/31/2017    Last edited by Haydee Fowler on 9/22/2017  3:14 PM. (History)            Assessment /Plan     For exam results, see Encounter Report.    Posterior capsular opacification, bilateral      Visually significant posterior capsular opacity present.  Discussed risks, benefits, and alternatives to laser surgery.  YAG laser capsulotomy Procedure Note:   Informed consent obtained and correct eye(s) verified with patient.  1 drop of topical Proparacaine and Iopidine instilled, and eye(s) dilated with 1% Tropicamide 2.5% Phenylephrine.  YAG laser applied to posterior capsule in cruciate pattern OS  Patient tolerated procedure well. No complications. Follow up in 1 month/PRN.

## 2017-09-23 ENCOUNTER — HOSPITAL ENCOUNTER (OUTPATIENT)
Dept: RADIOLOGY | Facility: HOSPITAL | Age: 57
Discharge: HOME | End: 2017-09-23
Attending: SPECIALIST
Payer: COMMERCIAL

## 2017-09-23 PROCEDURE — 76856 US EXAM PELVIC COMPLETE: CPT | Mod: 26,,, | Performed by: RADIOLOGY

## 2017-09-23 PROCEDURE — 76830 TRANSVAGINAL US NON-OB: CPT | Mod: 26,,, | Performed by: RADIOLOGY

## 2017-09-23 PROCEDURE — 76856 US EXAM PELVIC COMPLETE: CPT | Mod: TC

## 2017-10-09 RX ORDER — LEVOTHYROXINE SODIUM 125 UG/1
TABLET ORAL
Qty: 30 TABLET | Refills: 3 | Status: SHIPPED | OUTPATIENT
Start: 2017-10-09 | End: 2017-11-09 | Stop reason: SDUPTHER

## 2017-11-03 DIAGNOSIS — M25.512 ACUTE PAIN OF LEFT SHOULDER: Primary | ICD-10-CM

## 2017-11-06 ENCOUNTER — OFFICE VISIT (OUTPATIENT)
Dept: ORTHOPEDICS | Facility: CLINIC | Age: 57
End: 2017-11-06
Payer: COMMERCIAL

## 2017-11-06 ENCOUNTER — HOSPITAL ENCOUNTER (OUTPATIENT)
Dept: RADIOLOGY | Facility: HOSPITAL | Age: 57
Discharge: HOME | End: 2017-11-06
Attending: ORTHOPAEDIC SURGERY
Payer: COMMERCIAL

## 2017-11-06 VITALS — BODY MASS INDEX: 27.31 KG/M2 | WEIGHT: 160 LBS | HEIGHT: 64 IN

## 2017-11-06 DIAGNOSIS — M75.02 ADHESIVE CAPSULITIS OF LEFT SHOULDER: Primary | ICD-10-CM

## 2017-11-06 DIAGNOSIS — M25.511 ACUTE PAIN OF BOTH SHOULDERS: Primary | ICD-10-CM

## 2017-11-06 DIAGNOSIS — M25.512 ACUTE PAIN OF LEFT SHOULDER: ICD-10-CM

## 2017-11-06 DIAGNOSIS — M25.512 ACUTE PAIN OF BOTH SHOULDERS: Primary | ICD-10-CM

## 2017-11-06 DIAGNOSIS — M75.81 ROTATOR CUFF TENDINITIS, RIGHT: ICD-10-CM

## 2017-11-06 PROCEDURE — 99999 PR PBB SHADOW E&M-EST. PATIENT-LVL II: CPT | Mod: PBBFAC,,, | Performed by: ORTHOPAEDIC SURGERY

## 2017-11-06 PROCEDURE — 99214 OFFICE O/P EST MOD 30 MIN: CPT | Mod: S$GLB,,, | Performed by: ORTHOPAEDIC SURGERY

## 2017-11-07 NOTE — PROGRESS NOTES
"DATE: 11/6/2017  PATIENT: Lexi Chaney    Attending Physician: Zev Howard M.D.    HISTORY:  Lexi Chaney is a 57 y.o. female who returns for follow up evaluation of  her left shoulder.  She is diagnosed with a partial thickness rotator cuff tear.  She last underwent cortisone injection on 5/31/17.  She states the injection helped somewhat but really helped was physical therapy.  She saw a therapist on the [place] provided great improvement in her symptoms.  However she lost her job and therefore her insurance and could not go to therapy.  She states her pain is recurred.  Requesting prescription to return back to physical therapy.  She is also complaining of a new problem of right shoulder pain which she states developed after she stopped therapy to the left shoulder.  Pain with overhead activities.  She has pain at night.  She is requesting physical therapy for the right shoulder as well.  Pain is reported at 6/10 today.    PMH/PSH/FamHx/SocHx:  Reviewed and unchanged from prior visit    ROS:  Constitution: Negative for chills, fever, and sweats. Negative for unexplained weight loss.  HENT: Negative for headaches and blurry vision.   Cardiovascular: Negative for chest pain, irregular heartbeat, leg swelling and palpitations.   Respiratory: Negative for cough and shortness of breath.   Gastrointestinal: Negative for abdominal pain, heartburn, nausea and vomiting.   Genitourinary: Negative for bladder incontinence and dysuria.   Musculoskeletal: Negative for systemic arthritis, joint swelling, muscle weakness and myalgias.   Neurological: Negative for numbness.   Psychiatric/Behavioral: Negative for depression.   Endocrine: Negative for polyuria.   Hematologic/Lymphatic: Negative for bleeding disorders.  Skin: Negative for poor wound healing.       EXAM:  Ht 5' 4" (1.626 m)   Wt 72.6 kg (160 lb)   BMI 27.46 kg/m²   Lexi Chaney is a well developed, well nourished female in no acute distress. " Physical examination of the bilateral shoulder evaluated the following:    Inspection, palpation and ROM of the cervical spine  Disc compression testing bilaterally  Inspection for swelling, ecchymosis, erythema, deformity and atrophy  Tenderness to palpation of the soft tissue and bony structures  Active and passive range of motion  Sensation of the shoulder and upper extremity  Motor strength in the deltoid, supraspinatus, internal rotators and external rotators  Impingement, apprehension, relocation and Speed's tests  Upper extremity vascular exam (skin temp,color, capillary refill)  Inspection for pseudomotor signs    Remarkable findings included:  Full range of motion cervical spine.  Negative disc compression sign.  Range of motion of both shoulders shows range of motion is full except for mild limitation to external rotation of the left shoulder.  Motor strength 5/5 in the supraspinous and external rotators.  Mild impingement on the right.  Negative impingement sign on the left.  Sensation intact to both upper extremities.        IMAGING:   No x-rays are performed today.    ASSESSMENT:  Rotator cuff tendinitis right shoulder  Mild adhesive capsulitis left shoulder.    PLAN:  The implications of the patient's evolution of symptoms and findings were explained to the patient in detail.  As she had a good response to physical therapy in the past, do think is reasonable to try course of therapy again.  Prescription given to the patient.  Should she continue to remain symptomatic, she'll return for further treatment recommendations.  Follow-up if not improving or worse          This note was dictated using voice recognition software. Please excuse any grammatical or typographical errors.

## 2017-11-08 ENCOUNTER — TELEPHONE (OUTPATIENT)
Dept: ORTHOPEDICS | Facility: CLINIC | Age: 57
End: 2017-11-08

## 2017-11-08 DIAGNOSIS — M75.81 ROTATOR CUFF TENDINITIS, RIGHT: ICD-10-CM

## 2017-11-08 DIAGNOSIS — M75.02 ADHESIVE CAPSULITIS OF LEFT SHOULDER: Primary | ICD-10-CM

## 2017-11-08 NOTE — TELEPHONE ENCOUNTER
----- Message from Naty Reynolds sent at 11/8/2017  1:55 PM CST -----  Contact: Lexi Fay is calling as [facility] has not received Physical Therapy orders. Please fax [phone]. Please call when faxed [phone]. Thanks!

## 2017-11-08 NOTE — TELEPHONE ENCOUNTER
Contacted pt. Advised physical therapy orders have been faxed to requested facility. Pt verbalized understanding.

## 2017-11-09 RX ORDER — LEVOTHYROXINE SODIUM 125 UG/1
TABLET ORAL
Qty: 90 TABLET | Refills: 3 | Status: SHIPPED | OUTPATIENT
Start: 2017-11-09 | End: 2018-05-28 | Stop reason: SDUPTHER

## 2017-11-12 DIAGNOSIS — F51.01 PRIMARY INSOMNIA: ICD-10-CM

## 2017-11-12 DIAGNOSIS — F41.9 ANXIETY: ICD-10-CM

## 2017-11-14 RX ORDER — ALPRAZOLAM 0.5 MG/1
TABLET ORAL
Qty: 90 TABLET | Refills: 0 | OUTPATIENT
Start: 2017-11-14

## 2018-01-08 RX ORDER — LIOTHYRONINE SODIUM 5 UG/1
TABLET ORAL
Qty: 90 TABLET | Refills: 3 | Status: SHIPPED | OUTPATIENT
Start: 2018-01-08 | End: 2018-05-28

## 2018-01-25 ENCOUNTER — DOCUMENTATION ONLY (OUTPATIENT)
Dept: FAMILY MEDICINE | Facility: CLINIC | Age: 58
End: 2018-01-25

## 2018-01-25 NOTE — PROGRESS NOTES
Pre-Visit Chart Review  For Appointment Scheduled on 1/26/18.    Health Maintenance Due   Topic Date Due    Influenza Vaccine  08/01/2017

## 2018-01-26 ENCOUNTER — OFFICE VISIT (OUTPATIENT)
Dept: FAMILY MEDICINE | Facility: CLINIC | Age: 58
End: 2018-01-26
Payer: COMMERCIAL

## 2018-01-26 VITALS
DIASTOLIC BLOOD PRESSURE: 65 MMHG | BODY MASS INDEX: 27.85 KG/M2 | WEIGHT: 163.13 LBS | SYSTOLIC BLOOD PRESSURE: 118 MMHG | HEART RATE: 88 BPM | RESPIRATION RATE: 20 BRPM | HEIGHT: 64 IN | TEMPERATURE: 99 F

## 2018-01-26 DIAGNOSIS — F41.9 ANXIETY: Primary | ICD-10-CM

## 2018-01-26 DIAGNOSIS — Z00.00 WELLNESS EXAMINATION: ICD-10-CM

## 2018-01-26 DIAGNOSIS — E55.9 VITAMIN D DEFICIENCY: ICD-10-CM

## 2018-01-26 DIAGNOSIS — F51.01 PRIMARY INSOMNIA: ICD-10-CM

## 2018-01-26 DIAGNOSIS — E03.9 HYPOTHYROIDISM, UNSPECIFIED TYPE: ICD-10-CM

## 2018-01-26 DIAGNOSIS — K21.9 GASTROESOPHAGEAL REFLUX DISEASE WITHOUT ESOPHAGITIS: ICD-10-CM

## 2018-01-26 PROCEDURE — 99396 PREV VISIT EST AGE 40-64: CPT | Mod: S$GLB,,, | Performed by: FAMILY MEDICINE

## 2018-01-26 PROCEDURE — 99999 PR PBB SHADOW E&M-EST. PATIENT-LVL III: CPT | Mod: PBBFAC,,, | Performed by: FAMILY MEDICINE

## 2018-01-26 RX ORDER — ALPRAZOLAM 0.5 MG/1
0.5 TABLET ORAL NIGHTLY PRN
Qty: 30 TABLET | Refills: 0 | Status: SHIPPED | OUTPATIENT
Start: 2018-01-26 | End: 2018-06-29 | Stop reason: SDUPTHER

## 2018-01-26 NOTE — PROGRESS NOTES
"[facility]  Progress Note    Subjective:       Patient ID: Lexi Chaney is a 57 y.o. female.    Chief Complaint: Follow-up and Annual Exam    HPI57 y.o.female her for follow up of her Anxiety. She reports multiple life-stressors over the past year including the loss of her job, passing of her mother and her  losing his job. She salty with the stress by praying. She also takes 1/2 of Alprazolam 0.5 MG once a week to help her fall asleep. She reports chronic heat intolerance and is on liothyronine 5 MCG and synthyroid 125 MCG for Hypothyroidism s/p Radiation therapy for Grave's Disease. She denies any chest pain, palpitations, diaphoresis or weight loss. She has no complaints today.     Review of Systems   Constitutional: Negative for activity change and unexpected weight change.   HENT: Positive for trouble swallowing. Negative for hearing loss and rhinorrhea.    Eyes: Negative for discharge and visual disturbance.   Respiratory: Negative for chest tightness and wheezing.    Cardiovascular: Negative for chest pain and palpitations.   Gastrointestinal: Negative for blood in stool, constipation, diarrhea and vomiting.   Endocrine: Negative for polydipsia and polyuria.   Genitourinary: Negative for difficulty urinating, dysuria, hematuria and menstrual problem.   Musculoskeletal: Positive for neck pain. Negative for arthralgias and joint swelling.   Neurological: Negative for weakness and headaches.   Psychiatric/Behavioral: Negative for confusion and dysphoric mood.       Objective:      Vitals:    01/26/18 0941   BP: 118/65   BP Location: Right arm   Patient Position: Sitting   BP Method: Medium (Automatic)   Pulse: 88   Resp: 20   Temp: 98.8 °F (37.1 °C)   TempSrc: Oral   Weight: 74 kg (163 lb 2.3 oz)   Height: 5' 4" (1.626 m)     Body mass index is 28 kg/m².  Physical Exam   Constitutional: She is oriented to person, place, and time. She appears well-developed and well-nourished. No distress. "   HENT:   Head: Normocephalic and atraumatic.   Eyes: Conjunctivae are normal. No scleral icterus.   Neck: Normal range of motion. No thyromegaly present.   Cardiovascular: Normal rate, regular rhythm and intact distal pulses.  Exam reveals no gallop and no friction rub.    No murmur heard.  Pulmonary/Chest: Breath sounds normal. No respiratory distress. She has no wheezes. She has no rales.   Abdominal: Soft. Bowel sounds are normal. She exhibits no distension. There is no tenderness.   Musculoskeletal: Normal range of motion.   Neurological: She is alert and oriented to person, place, and time.   Skin: Skin is warm and dry.   Psychiatric: She has a normal mood and affect.       Assessment:       1. Anxiety    2. Hypothyroidism, unspecified type    3. Vitamin D deficiency    4. Gastroesophageal reflux disease without esophagitis    5. Primary insomnia    6. Wellness examination        Plan:       Anxiety  -     ALPRAZolam (XANAX) 0.5 MG tablet; Take 1 tablet (0.5 mg total) by mouth nightly as needed.  Dispense: 30 tablet; Refill: 0    Hypothyroidism, unspecified type  -     Cancel: TSH; Future; Expected date: 01/26/2018  -     Cancel: T4, free; Future; Expected date: 01/26/2018  -     TSH; Future; Expected date: 01/26/2018  -     T4, free; Future; Expected date: 01/26/2018    Vitamin D deficiency  -     Cancel: Vitamin D; Future; Expected date: 01/26/2018  -     Vitamin D; Future; Expected date: 01/26/2018    Gastroesophageal reflux disease without esophagitis   Stable, will continue to monitor     Primary insomnia  -     ALPRAZolam (XANAX) 0.5 MG tablet; Take 1 tablet (0.5 mg total) by mouth nightly as needed.  Dispense: 30 tablet; Refill: 0    Wellness examination  -     Cancel: CBC auto differential; Future; Expected date: 01/26/2018  -     Cancel: Hemoglobin A1c; Future; Expected date: 01/26/2018  -     Cancel: Comprehensive metabolic panel; Future; Expected date: 01/26/2018  -     Cancel: Lipid panel; Future;  Expected date: 01/26/2018  -     CBC auto differential; Future; Expected date: 01/26/2018  -     Hemoglobin A1c; Future; Expected date: 01/26/2018  -     Comprehensive metabolic panel; Future; Expected date: 01/26/2018  -     Lipid panel; Future; Expected date: 01/26/2018      Follow-up in about 6 months (around 7/26/2018).  Jonny Jacob MD  [facility]  1/26/2018 10:00 AM

## 2018-02-03 LAB
25(OH)D3+25(OH)D2 SERPL-MCNC: 26 NG/ML (ref 30–100)
ALBUMIN SERPL-MCNC: 4.6 G/DL (ref 3.5–5.5)
ALBUMIN/GLOB SERPL: 1.8 {RATIO} (ref 1.2–2.2)
ALP SERPL-CCNC: 95 IU/L (ref 39–117)
ALT SERPL-CCNC: 12 IU/L (ref 0–32)
AST SERPL-CCNC: 22 IU/L (ref 0–40)
BASOPHILS # BLD AUTO: 0 X10E3/UL (ref 0–0.2)
BASOPHILS NFR BLD AUTO: 1 %
BILIRUB SERPL-MCNC: 0.3 MG/DL (ref 0–1.2)
BUN SERPL-MCNC: 14 MG/DL (ref 6–24)
BUN/CREAT SERPL: 20 (ref 9–23)
CALCIUM SERPL-MCNC: 9.3 MG/DL (ref 8.7–10.2)
CHLORIDE SERPL-SCNC: 101 MMOL/L (ref 96–106)
CHOLEST SERPL-MCNC: 235 MG/DL (ref 100–199)
CO2 SERPL-SCNC: 23 MMOL/L (ref 18–29)
CREAT SERPL-MCNC: 0.71 MG/DL (ref 0.57–1)
EOSINOPHIL # BLD AUTO: 0.1 X10E3/UL (ref 0–0.4)
EOSINOPHIL NFR BLD AUTO: 1 %
ERYTHROCYTE [DISTWIDTH] IN BLOOD BY AUTOMATED COUNT: 13.4 % (ref 12.3–15.4)
GFR SERPLBLD CREATININE-BSD FMLA CKD-EPI: 108 ML/MIN/1.73
GFR SERPLBLD CREATININE-BSD FMLA CKD-EPI: 94 ML/MIN/1.73
GLOBULIN SER CALC-MCNC: 2.6 G/DL (ref 1.5–4.5)
GLUCOSE SERPL-MCNC: 90 MG/DL (ref 65–99)
HBA1C MFR BLD: 5.4 % (ref 4.8–5.6)
HCT VFR BLD AUTO: 41.5 % (ref 34–46.6)
HDLC SERPL-MCNC: 76 MG/DL
HGB BLD-MCNC: 14 G/DL (ref 11.1–15.9)
IMM GRANULOCYTES # BLD: 0 X10E3/UL (ref 0–0.1)
IMM GRANULOCYTES NFR BLD: 0 %
LDLC SERPL CALC-MCNC: 139 MG/DL (ref 0–99)
LYMPHOCYTES # BLD AUTO: 1.5 X10E3/UL (ref 0.7–3.1)
LYMPHOCYTES NFR BLD AUTO: 25 %
MCH RBC QN AUTO: 30.6 PG (ref 26.6–33)
MCHC RBC AUTO-ENTMCNC: 33.7 G/DL (ref 31.5–35.7)
MCV RBC AUTO: 91 FL (ref 79–97)
MONOCYTES # BLD AUTO: 0.6 X10E3/UL (ref 0.1–0.9)
MONOCYTES NFR BLD AUTO: 11 %
NEUTROPHILS # BLD AUTO: 3.7 X10E3/UL (ref 1.4–7)
NEUTROPHILS NFR BLD AUTO: 62 %
PLATELET # BLD AUTO: 321 X10E3/UL (ref 150–379)
POTASSIUM SERPL-SCNC: 4.7 MMOL/L (ref 3.5–5.2)
PROT SERPL-MCNC: 7.2 G/DL (ref 6–8.5)
RBC # BLD AUTO: 4.57 X10E6/UL (ref 3.77–5.28)
SODIUM SERPL-SCNC: 142 MMOL/L (ref 134–144)
T4 FREE SERPL-MCNC: 1.47 NG/DL (ref 0.82–1.77)
TRIGL SERPL-MCNC: 98 MG/DL (ref 0–149)
TSH SERPL DL<=0.005 MIU/L-ACNC: 1.85 UIU/ML (ref 0.45–4.5)
VLDLC SERPL CALC-MCNC: 20 MG/DL (ref 5–40)
WBC # BLD AUTO: 5.9 X10E3/UL (ref 3.4–10.8)

## 2018-02-08 ENCOUNTER — TELEPHONE (OUTPATIENT)
Dept: FAMILY MEDICINE | Facility: CLINIC | Age: 58
End: 2018-02-08

## 2018-02-08 NOTE — TELEPHONE ENCOUNTER
Called pt regarding results and recommendations per Ayse ALVARADO. See result     ----- Message from Blas Higgins sent at 2/8/2018  2:21 PM CST -----  Contact: Patient  Patient states that she is returning the call and to please call her back at [phone].  Thank you

## 2018-03-13 ENCOUNTER — OFFICE VISIT (OUTPATIENT)
Dept: ORTHOPEDICS | Facility: CLINIC | Age: 58
End: 2018-03-13
Payer: COMMERCIAL

## 2018-03-13 ENCOUNTER — HOSPITAL ENCOUNTER (OUTPATIENT)
Dept: RADIOLOGY | Facility: HOSPITAL | Age: 58
Discharge: HOME | End: 2018-03-13
Attending: ORTHOPAEDIC SURGERY
Payer: COMMERCIAL

## 2018-03-13 VITALS
WEIGHT: 163.13 LBS | HEART RATE: 77 BPM | SYSTOLIC BLOOD PRESSURE: 124 MMHG | HEIGHT: 64 IN | BODY MASS INDEX: 27.85 KG/M2 | DIASTOLIC BLOOD PRESSURE: 57 MMHG

## 2018-03-13 DIAGNOSIS — M25.511 RIGHT SHOULDER PAIN, UNSPECIFIED CHRONICITY: ICD-10-CM

## 2018-03-13 DIAGNOSIS — M25.511 RIGHT SHOULDER PAIN, UNSPECIFIED CHRONICITY: Primary | ICD-10-CM

## 2018-03-13 DIAGNOSIS — M75.41 IMPINGEMENT SYNDROME OF RIGHT SHOULDER: Primary | ICD-10-CM

## 2018-03-13 PROCEDURE — 99999 PR PBB SHADOW E&M-EST. PATIENT-LVL III: CPT | Mod: PBBFAC,,, | Performed by: ORTHOPAEDIC SURGERY

## 2018-03-13 PROCEDURE — 99214 OFFICE O/P EST MOD 30 MIN: CPT | Mod: 25,S$GLB,, | Performed by: ORTHOPAEDIC SURGERY

## 2018-03-13 PROCEDURE — 20610 DRAIN/INJ JOINT/BURSA W/O US: CPT | Mod: RT,S$GLB,, | Performed by: ORTHOPAEDIC SURGERY

## 2018-03-13 PROCEDURE — 73030 X-RAY EXAM OF SHOULDER: CPT | Mod: TC,PN,RT

## 2018-03-13 PROCEDURE — 73030 X-RAY EXAM OF SHOULDER: CPT | Mod: 26,RT,, | Performed by: RADIOLOGY

## 2018-03-13 RX ADMIN — TRIAMCINOLONE ACETONIDE 40 MG: 40 INJECTION, SUSPENSION INTRA-ARTICULAR; INTRAMUSCULAR at 08:03

## 2018-03-16 RX ORDER — TRIAMCINOLONE ACETONIDE 40 MG/ML
40 INJECTION, SUSPENSION INTRA-ARTICULAR; INTRAMUSCULAR
Status: DISCONTINUED | OUTPATIENT
Start: 2018-03-13 | End: 2018-03-16 | Stop reason: HOSPADM

## 2018-03-17 NOTE — PROCEDURES
Large Joint Aspiration/Injection  Date/Time: 3/13/2018 8:45 PM  Performed by: VICENTE LANE  Authorized by: VICENTE LANE     Consent Done?:  Yes (Verbal)  Indications:  Pain  Timeout: Prior to procedure the correct patient, procedure, and site was verified      Location:  Shoulder  Site:  R subacromial bursa  Prep: Patient was prepped and draped in usual sterile fashion    Approach:  Lateral  Medications:  40 mg triamcinolone acetonide 40 mg/mL

## 2018-03-17 NOTE — PROGRESS NOTES
Past Medical History:   Diagnosis Date    Abnormal Pap smear     Arthritis     Cataract     Grave's disease     Grave's disease     IBS (irritable bowel syndrome)     rare    Internal hemorrhoids     followed by Dr. Schilling    Nephrolithiasis     followed by Dr. Pope    Thyroid disease     Vitamin D deficiency        Past Surgical History:   Procedure Laterality Date    CATARACT EXTRACTION  11/19/12    right eye (toric)    CATARACT EXTRACTION W/  INTRAOCULAR LENS IMPLANT  11/26/12    left eye (toric)    COLONOSCOPY  ~2005  [facility] [place]    Repeat in 10 years.    DEXA   2010    NORMAL    EXTRACORPOREAL SHOCK WAVE LITHOTRIPSY      EYE SURGERY      tonsillectomy      UPPER GASTROINTESTINAL ENDOSCOPY  12/3/2003  [facility]       Current Outpatient Prescriptions   Medication Sig    ALPRAZolam (XANAX) 0.5 MG tablet Take 1 tablet (0.5 mg total) by mouth nightly as needed.    cholecalciferol, vitamin D3, 5,000 unit Tab Take 5,000 Units by mouth every other day.    dicyclomine (BENTYL) 20 mg tablet Take 20 mg by mouth every 6 (six) hours.    liothyronine (CYTOMEL) 5 MCG Tab TAKE 1 TABLET BY MOUTH EVERY DAY    SYNTHROID 125 mcg tablet 1 tablet 6 days a week and 1/2 tablet on day 7     No current facility-administered medications for this visit.        Review of patient's allergies indicates:   Allergen Reactions    Codeine Nausea And Vomiting    Sulfa (sulfonamide antibiotics) Itching       Family History   Problem Relation Age of Onset    Hypertension Father     Stroke Father     Heart disease Mother     Hypertension Mother     Heart disease Brother     Hypertension Brother     Diabetes Brother     Amblyopia Neg Hx     Blindness Neg Hx     Cancer Neg Hx     Cataracts Neg Hx     Glaucoma Neg Hx     Macular degeneration Neg Hx     Retinal detachment Neg Hx     Strabismus Neg Hx     Thyroid disease Neg Hx     Anesthesia problems Neg Hx     Clotting disorder Neg Hx        Social History  "    Social History    Marital status:      Spouse name: N/A    Number of children: 2    Years of education: N/A     Occupational History    [employer]     Social History Main Topics    Smoking status: Never Smoker    Smokeless tobacco: Never Used    Alcohol use Yes      Comment: q month    Drug use: No    Sexual activity: Yes     Partners: Male     Birth control/ protection: Post-menopausal     Other Topics Concern    Not on file     Social History Narrative    No narrative on file       Chief Complaint:   Chief Complaint   Patient presents with    Right Shoulder - Pain       Consulting Physician: Self, Aaareferral    History of present illness:    This is a 57 y.o. female who complains of right shoulder pain for 1 week. Pain 9/10 and worse with use. Decreased ROM. No injury.    Review of Systems:    Constitution: Denies chills, fever, and sweats.  HENT: Denies headaches or blurry vision.  Cardiovascular: Denies chest pain or irregular heart beat.  Respiratory: Denies cough or shortness of breath.  Gastrointestinal: Denies abdominal pain, nausea, or vomiting.  Musculoskeletal:  Denies muscle cramps.  Neurological: Denies dizziness or focal weakness.  Psychiatric/Behavioral: Normal mental status.  Hematologic/Lymphatic: Denies bleeding problem or easy bruising/bleeding.  Skin: Denies rash or suspicious lesions.    Examination:    Vital Signs:    Vitals:    03/13/18 0957   BP: (!) 124/57   Pulse: 77   Weight: 74 kg (163 lb 2.3 oz)   Height: 5' 4" (1.626 m)   PainSc:   9   PainLoc: Shoulder       Body mass index is 28 kg/m².    This a well-developed, well nourished patient in no acute distress.    Alert and oriented x 3 and cooperative to examination.       Physical Exam: Right Shoulder Exam     Skin  Rash:   None  Scars:   None    Inspection/Observation   Swelling:   none  Erythema:   none  Bruising:   none  Wounds:   none  Scapular Winging:  none  Scapular Dyskinesia: "  mild  Atrophy:   none  Masses:   none  Lymphadenopathy: None    Tenderness   AC Joint:   Mild  Bicep groove:  Mild    Range of Motion   Active Forward Flexion:  180   Active External Rotation:  >45  Active Internal Rotation:  Low Back    Passive Forward Flexion:  180  Passive External Rotation:  >45  Passive Internal Rotation at 90 degrees: Limited    Muscle Strength   Forward Flexion:  5/5  External Rotation:  5/5  Internal Rotation:  5/5    Tests & Signs   Cross Arm:   negative  Hawkin's test:   positive  Impingement:   positive    Other   Sensation:  normal  Pulse:    2+ radial          Imaging: X-rays ordered and reviewed today personally of the right shoulder show mild DJD changes.        Assessment: Impingement syndrome of right shoulder  -     Large Joint Aspiration/Injection        Plan:  She has some GIRD so we will inject and start PT. 6 weeks.      DISCLAIMER: This note may have been dictated using voice recognition software and may contain grammatical errors.     NOTE: Consult report sent to referring provider via [employer] EMR.

## 2018-04-06 ENCOUNTER — CLINICAL SUPPORT (OUTPATIENT)
Dept: REHABILITATION | Facility: HOSPITAL | Age: 58
End: 2018-04-06
Attending: ORTHOPAEDIC SURGERY
Payer: COMMERCIAL

## 2018-04-06 DIAGNOSIS — R29.898 SHOULDER WEAKNESS: ICD-10-CM

## 2018-04-06 DIAGNOSIS — M25.611 SHOULDER STIFFNESS, RIGHT: ICD-10-CM

## 2018-04-06 DIAGNOSIS — M25.511 RIGHT SHOULDER PAIN, UNSPECIFIED CHRONICITY: Primary | ICD-10-CM

## 2018-04-06 PROCEDURE — 97165 OT EVAL LOW COMPLEX 30 MIN: CPT | Mod: PN

## 2018-04-06 NOTE — PLAN OF CARE
"Date: 4-6-18    Time in: 5  Time out: 545    Procedures: eval  Start: 5  Stop: 545    Total untimed minutes: 45  Charges billed # of units: 1    Onset date: about 1 wk pre 3-13-18  Primary dx: r shoulder impingement  Tx dx: r shoulder pain, stiffness, weakness    Pmhx relevant to primary or tx dx: l shoulder adhesive capsulitis treated with therapy late last year, pt. Reports l shoulder with no current deficits    Hx of present illness: insidious onset; saw doulens who xrayed, injected subacromially, and sent here  PLOF: full painless use  Fxnl deficits leading to referral: decreased rom, use, and strength with ADL  Patient therapy goals: full painless use    Subjective    Patient states: understanding of HEP importance and general anticipated progression of therapy    Pain: C5 ache  Rest 0/10, use up to 10, sleep 0    Objective    ROM:   r prom er at 0 abd 80   at 45 abd 90    at 90 abd 90;       sidelying ir 60         ir behind back 15" lift off    all capsular  l prom er at 0 abd 80    at 45 abd 90   at  90 abd 90;      sidelying ir 80         ir behind back 15" lift off  ADL: decreased elevation an HADD with all basic tasks  Hand dominance: r  Job:   Duties:per job; Normal self and home care tasks  Tx: issued proper ice use for pain, light painless nonrepetitive use only, sleeper stretch    Assessment    Initial assessment (pertinent findings, problem list and factors affecting outcome): diablo effect; understanding of HEP importance and general anticipated progression of therapy  Rehab potential: good    Goals:   stg 1. Pt. Will be I with HEP 2. Pt. Will have 2/10 pain with light use 3. Pt. Will have = prom of bilateral shoulders to enhance affected arm use with ADL      ltg 1. Pt. Will be I with d/c HEP 2. Pt. Will have 1/10 pain with all use 3. Pt. Will have roughly 3+/5 elevation, er MMT to improve use with ADL 4. Pt. Ill be I with ADL                                                                 "                  Plan    Certification period: 4-6-18 to 10-12-18  Recommended tx plan: 3 times a week for 24 weeks, eval and tx  Other recommendations: above visit frequency and duration in above dates may be adjusted based on pt. progress and need for therapy    Therapist: michelle jin cht      eval code grid  Profile and History Assessment of Occupational Performance Level of Clinical Decision Making Complexity Score   Occupational Profile:   Lexi Chaney is a 57 y.o. female who lives with  {and is currently employed as . Lexi Chaney has difficulty with  feeding, bathing, grooming and dressing  home chores.  affecting his/her daily functional abilities. His/her main goal for therapy is full painless use.     Comorbidities:   n/a    Medical and Therapy History Review:   Brief               Performance Deficits    Physical:  Joint Mobility  Muscle Power/Strength  Pain    Cognitive:  No Deficits    Psychosocial:    No Deficits     Clinical Decision Making:  low    Assessment Process:  Problem-Focused Assessments    Modification/Need for Assistance:  Not Necessary    Intervention Selection:  Limited options       low  Based on PMHX, co morbidities , data from assessments and functional level of assistance required with task and clinical presentation directly impacting function.

## 2018-04-30 ENCOUNTER — CLINICAL SUPPORT (OUTPATIENT)
Dept: REHABILITATION | Facility: HOSPITAL | Age: 58
End: 2018-04-30
Attending: ORTHOPAEDIC SURGERY
Payer: COMMERCIAL

## 2018-04-30 DIAGNOSIS — M25.511 RIGHT SHOULDER PAIN, UNSPECIFIED CHRONICITY: Primary | ICD-10-CM

## 2018-04-30 DIAGNOSIS — R29.898 SHOULDER WEAKNESS: ICD-10-CM

## 2018-04-30 DIAGNOSIS — M25.611 SHOULDER STIFFNESS, RIGHT: ICD-10-CM

## 2018-04-30 PROCEDURE — 97140 MANUAL THERAPY 1/> REGIONS: CPT | Mod: PN

## 2018-04-30 PROCEDURE — 97110 THERAPEUTIC EXERCISES: CPT | Mod: PN

## 2018-04-30 NOTE — PROGRESS NOTES
"Time in 5  Time out 545      Timed units  2 manual                              Time:5-530  2 therex                              Time:530-6      S:no new issues  Pain:continues to decrease in intensity and frequency    O:  r prom er at 0 abd 80   at 45 abd 90     at 90 abd 90;       sidelying ir 70         ir behind back 15" lift off  l prom er at 0 abd 80    at 45 abd 90   at  90 abd 90;      sidelying ir 80         ir behind back 15" lift off    HEP: reviewed     manual tx:     prom ir/er with passive scaption as tolerated-pain free  prom circumduction, bursal massage abd 1 and 2, axial traction 90 abd    prom as tolerated-pain free: n/a    stretches as tolerated-pain free: sleeper    theraband 2 x 20:  n/a    isometrics 2 x 20: n/a    education: likely tx progression     ube: n/a            A:  gh hypomobility decreasing, overall reactivity with continued reduction, continued rehab to fix gh stiffness then screening of prom elevation x 3/remaining elevation chain will be indicated                P:fix inferior posterior gh stiffness  "

## 2018-05-18 ENCOUNTER — CLINICAL SUPPORT (OUTPATIENT)
Dept: REHABILITATION | Facility: HOSPITAL | Age: 58
End: 2018-05-18
Attending: ORTHOPAEDIC SURGERY
Payer: COMMERCIAL

## 2018-05-18 DIAGNOSIS — M25.511 RIGHT SHOULDER PAIN, UNSPECIFIED CHRONICITY: Primary | ICD-10-CM

## 2018-05-18 DIAGNOSIS — R29.898 SHOULDER WEAKNESS: ICD-10-CM

## 2018-05-18 DIAGNOSIS — M25.611 SHOULDER STIFFNESS, RIGHT: ICD-10-CM

## 2018-05-18 PROCEDURE — 97110 THERAPEUTIC EXERCISES: CPT | Mod: PN

## 2018-05-18 NOTE — PROGRESS NOTES
Time in 515  Time out 6      Timed units   3 therex                              Time:515-6      S: light functional use improving  Pain:continues to decrease in intensity and frequency    O:  Full prom er x 3 and ir x 2 and elevation x 3    Painful arc flex palm down (-), abd palm up (-)    willa screen shows mild depression test 1, test 2 thru 5 with some mild malpositioning    theraband 2 x 20: gray row, add, ir 0 abd    HEP: issued purple row, add, ir 0 abd; reviewed sleeper stretch            A:remaining PRE HEP needed to promote strong and painless mechanics long term with all required tasks            P:PRE HEP completion    7-19-18: d/c since patient has not attended therapy since 5-18-18, cancelled the two scheduled visits post 5-18-18

## 2018-05-21 ENCOUNTER — TELEPHONE (OUTPATIENT)
Dept: ENDOCRINOLOGY | Facility: CLINIC | Age: 58
End: 2018-05-21

## 2018-05-21 DIAGNOSIS — E55.9 VITAMIN D DEFICIENCY: ICD-10-CM

## 2018-05-21 DIAGNOSIS — E03.9 HYPOTHYROIDISM, UNSPECIFIED TYPE: Primary | ICD-10-CM

## 2018-05-21 NOTE — TELEPHONE ENCOUNTER
Pt advised orders are in to be done at [facility].  She verbalized understanding.

## 2018-05-21 NOTE — TELEPHONE ENCOUNTER
----- Message from Belle Dallas sent at 5/21/2018  2:13 PM CDT -----  Patient is scheduled on 05/28/18 she is requesting labs prior to appointment, patient is requesting order be sent to [facility] in [place], contact patient at [phone].    Thank you

## 2018-05-22 ENCOUNTER — TELEPHONE (OUTPATIENT)
Dept: ENDOCRINOLOGY | Facility: CLINIC | Age: 58
End: 2018-05-22

## 2018-05-22 DIAGNOSIS — E55.9 VITAMIN D DEFICIENCY: ICD-10-CM

## 2018-05-22 DIAGNOSIS — E03.9 HYPOTHYROIDISM, UNSPECIFIED TYPE: Primary | ICD-10-CM

## 2018-05-22 NOTE — TELEPHONE ENCOUNTER
Pt requested labs to be ordered and scheduled for [place] lab instead of [facility].  Orders re-entered and scheduled for 5/23/18 in [place].  Pt verbalized understanding.

## 2018-05-22 NOTE — TELEPHONE ENCOUNTER
----- Message from Veronica Felix sent at 5/22/2018  9:24 AM CDT -----  Contact: Patient  Patient would like to have her labs done at [facility] and she needs them to be put in the system in schedule.  Please call patient once the orders are in.  Call Back#[phone]  Thanks

## 2018-05-23 ENCOUNTER — LAB VISIT (OUTPATIENT)
Dept: LAB | Facility: HOSPITAL | Age: 58
End: 2018-05-23
Attending: INTERNAL MEDICINE
Payer: COMMERCIAL

## 2018-05-23 ENCOUNTER — NURSE TRIAGE (OUTPATIENT)
Dept: ADMINISTRATIVE | Facility: CLINIC | Age: 58
End: 2018-05-23

## 2018-05-23 DIAGNOSIS — E55.9 VITAMIN D DEFICIENCY: ICD-10-CM

## 2018-05-23 DIAGNOSIS — E03.9 HYPOTHYROIDISM, UNSPECIFIED TYPE: ICD-10-CM

## 2018-05-23 LAB
25(OH)D3+25(OH)D2 SERPL-MCNC: 33 NG/ML
T3FREE SERPL-MCNC: 2.2 PG/ML
T4 FREE SERPL-MCNC: 1.19 NG/DL
TSH SERPL DL<=0.005 MIU/L-ACNC: 1.81 UIU/ML

## 2018-05-23 PROCEDURE — 82306 VITAMIN D 25 HYDROXY: CPT

## 2018-05-23 PROCEDURE — 84481 FREE ASSAY (FT-3): CPT

## 2018-05-23 PROCEDURE — 84439 ASSAY OF FREE THYROXINE: CPT

## 2018-05-23 PROCEDURE — 36415 COLL VENOUS BLD VENIPUNCTURE: CPT | Mod: PO

## 2018-05-23 PROCEDURE — 84443 ASSAY THYROID STIM HORMONE: CPT

## 2018-05-23 NOTE — TELEPHONE ENCOUNTER
Reason for Disposition   Heart beating very rapidly (e.g., > 140 / minute) and present now (EXCEPTION: during exercise)    Protocols used: ST HEART RATE AND HEARTBEAT [id number]    Ms. Chaney states she has been experiencing palpitations for 5 days. She states it feels like her heart is pounding and symptom is constant. Patient advised to go to the ED.

## 2018-05-24 NOTE — TELEPHONE ENCOUNTER
I see they did thyroid labs yesterday. Does not appear to be thyroid related. WIll need to contact PCO or see urgent care

## 2018-05-24 NOTE — TELEPHONE ENCOUNTER
"Pt stated she is in the hospital now being seen for condition. Informed that it does not appear to be thyroid related but encouraged to call us if there is anything she may need  "thank you"  "

## 2018-05-28 ENCOUNTER — OFFICE VISIT (OUTPATIENT)
Dept: ENDOCRINOLOGY | Facility: CLINIC | Age: 58
End: 2018-05-28
Payer: COMMERCIAL

## 2018-05-28 VITALS
DIASTOLIC BLOOD PRESSURE: 72 MMHG | HEIGHT: 64 IN | SYSTOLIC BLOOD PRESSURE: 130 MMHG | BODY MASS INDEX: 27.35 KG/M2 | WEIGHT: 160.19 LBS | RESPIRATION RATE: 20 BRPM | HEART RATE: 72 BPM

## 2018-05-28 DIAGNOSIS — E55.9 VITAMIN D DEFICIENCY: ICD-10-CM

## 2018-05-28 DIAGNOSIS — R00.2 PALPITATION: ICD-10-CM

## 2018-05-28 DIAGNOSIS — E03.9 HYPOTHYROIDISM, UNSPECIFIED TYPE: Primary | ICD-10-CM

## 2018-05-28 PROCEDURE — 99214 OFFICE O/P EST MOD 30 MIN: CPT | Mod: S$GLB,,, | Performed by: INTERNAL MEDICINE

## 2018-05-28 PROCEDURE — 3008F BODY MASS INDEX DOCD: CPT | Mod: CPTII,S$GLB,, | Performed by: INTERNAL MEDICINE

## 2018-05-28 PROCEDURE — 99999 PR PBB SHADOW E&M-EST. PATIENT-LVL III: CPT | Mod: PBBFAC,,, | Performed by: INTERNAL MEDICINE

## 2018-05-28 RX ORDER — METOPROLOL SUCCINATE 25 MG/1
TABLET, EXTENDED RELEASE ORAL
Refills: 0 | COMMUNITY
Start: 2018-05-24 | End: 2018-06-29 | Stop reason: SDUPTHER

## 2018-05-28 RX ORDER — LEVOTHYROXINE SODIUM 125 UG/1
TABLET ORAL
Qty: 90 TABLET | Refills: 3 | Status: SHIPPED | OUTPATIENT
Start: 2018-05-28 | End: 2019-05-16 | Stop reason: SDUPTHER

## 2018-05-28 NOTE — PROGRESS NOTES
CHIEF COMPLAINT: Hypothyroidism/status post Graves  57-year-old female here for followup. Currently on Synthroid 125 mcg 6 days a week and 1/2 tablet on day 7 and Cytomel 5 daily. Went to ED for palpitations. She will be seeing cardiology.           PAST MEDICAL HISTORY: Graves treated with radioactive iodine in 2000. Also had Graves eye disease    PAST SURGICAL HISTORY: 3 surgeries for Graves eye disease, tonsillectomy, lithotripsy    SOCIAL HISTORY: Does not smoke or drink    FAMILY HISTORY: Hypothyroidism, heart disease, diabetes. Incidentally  had Graves' disease as well    MEDICATIONS/ALLERGIES: The patient's MedCard has been updated and reviewed.         ROS:   Constitutional: weight decreased   ENT: No dysphagia  Cardiovascular: Denies current anginal symptoms  Respiratory: Denies current respiratory difficulty  Gastrointestinal: Denies recent bowel disturbances  GenitoUrinary - No dysuria  Skin: No new skin rash  Neurologic: No focal neurologic complaints     PE:  GENERAL: Well developed, well nourished  LYMPHATIC: No cervical or supraclavicular lymphadenopathy  NECK. No palpable thyroid nodules.   CARDIOVASCULAR: Normal heart sounds, no pedal edema  RESPIRATORY: Normal effort, clear to auscultation      Results for LUKE ACOSTA (MRN [id number]) as of 5/28/2018 15:14   Ref. Range 5/23/2018 12:35   Vit D, 25-Hydroxy Latest Ref Range: 30 - 96 ng/mL 33   TSH Latest Ref Range: 0.400 - 4.000 uIU/mL 1.811   T3, Free Latest Ref Range: 2.3 - 4.2 pg/mL 2.2 (L)   Free T4 Latest Ref Range: 0.71 - 1.51 ng/dL 1.19           ASSESSMENT/PLAN:  1. Hypothyroidism- status post treatment with I-131 for Graves' disease. Will stop cytomel since having palpitations. Will be seeing cardiology.     2. Graves' eye disease- seeing ophthalmology    3. Vitamin D deficiency- continue current dose of Vit D    4. Palpitations- See # 1. Will see cardiology    FOLLOWUP: ([facility])  TSH, Ft4, Total T3 in 6 weeks.   F/U 6 months TSH,  Ft4, total T3.

## 2018-06-22 ENCOUNTER — LAB VISIT (OUTPATIENT)
Dept: LAB | Facility: HOSPITAL | Age: 58
End: 2018-06-22
Attending: INTERNAL MEDICINE
Payer: COMMERCIAL

## 2018-06-22 DIAGNOSIS — I51.9 MYXEDEMA HEART DISEASE: ICD-10-CM

## 2018-06-22 DIAGNOSIS — R00.2 PALPITATIONS: ICD-10-CM

## 2018-06-22 DIAGNOSIS — R42 DIZZINESS AND GIDDINESS: ICD-10-CM

## 2018-06-22 DIAGNOSIS — R00.2 PALPITATIONS: Primary | ICD-10-CM

## 2018-06-22 DIAGNOSIS — E05.00 BASEDOW'S DISEASE: ICD-10-CM

## 2018-06-22 DIAGNOSIS — E03.9 MYXEDEMA HEART DISEASE: ICD-10-CM

## 2018-06-22 LAB
ALBUMIN SERPL BCP-MCNC: 3.9 G/DL
ALP SERPL-CCNC: 94 U/L
ALT SERPL W/O P-5'-P-CCNC: 10 U/L
ANION GAP SERPL CALC-SCNC: 7 MMOL/L
AST SERPL-CCNC: 17 U/L
BILIRUB SERPL-MCNC: 0.4 MG/DL
BUN SERPL-MCNC: 13 MG/DL
CALCIUM SERPL-MCNC: 9.6 MG/DL
CHLORIDE SERPL-SCNC: 103 MMOL/L
CO2 SERPL-SCNC: 27 MMOL/L
CREAT SERPL-MCNC: 0.8 MG/DL
EST. GFR  (AFRICAN AMERICAN): >60 ML/MIN/1.73 M^2
EST. GFR  (NON AFRICAN AMERICAN): >60 ML/MIN/1.73 M^2
GLUCOSE SERPL-MCNC: 98 MG/DL
MAGNESIUM SERPL-MCNC: 2.3 MG/DL
POTASSIUM SERPL-SCNC: 4.2 MMOL/L
PROT SERPL-MCNC: 7.2 G/DL
SODIUM SERPL-SCNC: 137 MMOL/L
T4 FREE SERPL-MCNC: 1.25 NG/DL
T4 SERPL-MCNC: 9.2 UG/DL
TSH SERPL DL<=0.005 MIU/L-ACNC: 4.03 UIU/ML

## 2018-06-22 PROCEDURE — 80053 COMPREHEN METABOLIC PANEL: CPT

## 2018-06-22 PROCEDURE — 84443 ASSAY THYROID STIM HORMONE: CPT

## 2018-06-22 PROCEDURE — 84436 ASSAY OF TOTAL THYROXINE: CPT

## 2018-06-22 PROCEDURE — 84439 ASSAY OF FREE THYROXINE: CPT

## 2018-06-22 PROCEDURE — 36415 COLL VENOUS BLD VENIPUNCTURE: CPT | Mod: PO

## 2018-06-22 PROCEDURE — 83735 ASSAY OF MAGNESIUM: CPT

## 2018-06-24 ENCOUNTER — PATIENT MESSAGE (OUTPATIENT)
Dept: ENDOCRINOLOGY | Facility: CLINIC | Age: 58
End: 2018-06-24

## 2018-06-24 DIAGNOSIS — E03.9 HYPOTHYROIDISM, UNSPECIFIED TYPE: Primary | ICD-10-CM

## 2018-06-27 RX ORDER — LIOTHYRONINE SODIUM 5 UG/1
5 TABLET ORAL DAILY
Qty: 30 TABLET | Refills: 3 | Status: SHIPPED | OUTPATIENT
Start: 2018-06-27 | End: 2019-05-14 | Stop reason: SDUPTHER

## 2018-06-27 NOTE — TELEPHONE ENCOUNTER
Spoke to pt and adv of Dr Coker's previous message. Scheduled labs, adv date, time and location, voiced understanding.

## 2018-06-28 ENCOUNTER — DOCUMENTATION ONLY (OUTPATIENT)
Dept: FAMILY MEDICINE | Facility: CLINIC | Age: 58
End: 2018-06-28

## 2018-06-29 ENCOUNTER — OFFICE VISIT (OUTPATIENT)
Dept: FAMILY MEDICINE | Facility: CLINIC | Age: 58
End: 2018-06-29
Payer: COMMERCIAL

## 2018-06-29 VITALS
BODY MASS INDEX: 27.63 KG/M2 | TEMPERATURE: 99 F | DIASTOLIC BLOOD PRESSURE: 62 MMHG | WEIGHT: 161.81 LBS | HEIGHT: 64 IN | SYSTOLIC BLOOD PRESSURE: 106 MMHG | HEART RATE: 71 BPM

## 2018-06-29 DIAGNOSIS — F51.01 PRIMARY INSOMNIA: ICD-10-CM

## 2018-06-29 DIAGNOSIS — E03.9 HYPOTHYROIDISM, UNSPECIFIED TYPE: ICD-10-CM

## 2018-06-29 DIAGNOSIS — F41.9 ANXIETY: ICD-10-CM

## 2018-06-29 DIAGNOSIS — G47.00 INSOMNIA, UNSPECIFIED TYPE: Primary | ICD-10-CM

## 2018-06-29 DIAGNOSIS — R00.2 PALPITATIONS: ICD-10-CM

## 2018-06-29 DIAGNOSIS — K58.0 IRRITABLE BOWEL SYNDROME WITH DIARRHEA: ICD-10-CM

## 2018-06-29 PROCEDURE — 99999 PR PBB SHADOW E&M-EST. PATIENT-LVL III: CPT | Mod: PBBFAC,,, | Performed by: FAMILY MEDICINE

## 2018-06-29 PROCEDURE — 3008F BODY MASS INDEX DOCD: CPT | Mod: CPTII,S$GLB,, | Performed by: FAMILY MEDICINE

## 2018-06-29 PROCEDURE — 99214 OFFICE O/P EST MOD 30 MIN: CPT | Mod: S$GLB,,, | Performed by: FAMILY MEDICINE

## 2018-06-29 RX ORDER — DICYCLOMINE HYDROCHLORIDE 10 MG/1
10 CAPSULE ORAL 4 TIMES DAILY PRN
Qty: 40 CAPSULE | Status: SHIPPED | OUTPATIENT
Start: 2018-06-29 | End: 2018-07-29

## 2018-06-29 RX ORDER — METOPROLOL SUCCINATE 25 MG/1
TABLET, EXTENDED RELEASE ORAL
Qty: 45 TABLET | Refills: 3 | Status: SHIPPED | OUTPATIENT
Start: 2018-06-29 | End: 2020-02-18 | Stop reason: SDUPTHER

## 2018-06-29 RX ORDER — TRAZODONE HYDROCHLORIDE 50 MG/1
50 TABLET ORAL NIGHTLY PRN
Qty: 30 TABLET | Refills: 5 | Status: SHIPPED | OUTPATIENT
Start: 2018-06-29 | End: 2018-12-10

## 2018-06-29 RX ORDER — ALPRAZOLAM 0.5 MG/1
0.5 TABLET ORAL NIGHTLY PRN
Qty: 30 TABLET | Refills: 0 | Status: SHIPPED | OUTPATIENT
Start: 2018-06-29 | End: 2019-01-07 | Stop reason: SDUPTHER

## 2018-06-29 NOTE — PROGRESS NOTES
Subjective:       Patient ID: Lexi Chaney is a 57 y.o. female.    Chief Complaint: Establish Care    HPI  Review of Systems   Constitutional: Negative for activity change and unexpected weight change.   HENT: Positive for trouble swallowing. Negative for hearing loss and rhinorrhea.    Eyes: Negative for discharge and visual disturbance.   Respiratory: Negative for chest tightness and wheezing.    Cardiovascular: Positive for palpitations. Negative for chest pain.   Gastrointestinal: Negative for blood in stool, constipation, diarrhea and vomiting.   Endocrine: Negative for polydipsia and polyuria.   Genitourinary: Negative for difficulty urinating, dysuria, hematuria and menstrual problem.   Musculoskeletal: Positive for arthralgias. Negative for joint swelling and neck pain.   Neurological: Negative for weakness and headaches.   Psychiatric/Behavioral: Negative for confusion and dysphoric mood.       Patient Active Problem List   Diagnosis    Insomnia    Anxiety    Migraines    DJD (degenerative joint disease) of knee    Nephrolithiasis    Hypothyroidism    Plantar fasciitis    Metatarsalgia    Equinus deformity of foot, acquired    GERD (gastroesophageal reflux disease)    Shoulder pain    Vitamin D deficiency    Irritable bowel syndrome without diarrhea    Right shoulder pain    Shoulder stiffness, right    Shoulder weakness     Patient is here for a chronic conditions follow up.    Lab Visit on 06/22/2018   Component Date Value Ref Range Status    Sodium 06/22/2018 137  136 - 145 mmol/L Final    Potassium 06/22/2018 4.2  3.5 - 5.1 mmol/L Final    Chloride 06/22/2018 103  95 - 110 mmol/L Final    CO2 06/22/2018 27  23 - 29 mmol/L Final    Glucose 06/22/2018 98  70 - 110 mg/dL Final    BUN, Bld 06/22/2018 13  6 - 20 mg/dL Final    Creatinine 06/22/2018 0.8  0.5 - 1.4 mg/dL Final    Calcium 06/22/2018 9.6  8.7 - 10.5 mg/dL Final    Total Protein 06/22/2018 7.2  6.0 - 8.4 g/dL Final     Albumin 06/22/2018 3.9  3.5 - 5.2 g/dL Final    Total Bilirubin 06/22/2018 0.4  0.1 - 1.0 mg/dL Final    Alkaline Phosphatase 06/22/2018 94  55 - 135 U/L Final    AST 06/22/2018 17  10 - 40 U/L Final    ALT 06/22/2018 10  10 - 44 U/L Final    Anion Gap 06/22/2018 7* 8 - 16 mmol/L Final    eGFR if African American 06/22/2018 >60.0  >60 mL/min/1.73 m^2 Final    eGFR if non African American 06/22/2018 >60.0  >60 mL/min/1.73 m^2 Final    TSH 06/22/2018 4.032* 0.400 - 4.000 uIU/mL Final    Magnesium 06/22/2018 2.3  1.6 - 2.6 mg/dL Final    T4, Total 06/22/2018 9.2  4.5 - 11.5 ug/dL Final    Free T4 06/22/2018 1.25  0.71 - 1.51 ng/dL Final       Having heart palpitations-strong beats like PVCs.  Aggravated by anxiety and stress.  [facility] admitted 3/23. Had stress test and echo-normal. toprol XL added. Had holter-no abnl  Dr. Joni Patel tried and helped 1/2 bid with stresss  Dr. Coker endocrine  Objective:      Physical Exam   Constitutional: She is oriented to person, place, and time. She appears well-developed and well-nourished.   Cardiovascular: Normal rate, regular rhythm and normal heart sounds.    Pulmonary/Chest: Effort normal and breath sounds normal.   Musculoskeletal: She exhibits no edema.   Neurological: She is alert and oriented to person, place, and time.   Skin: Skin is warm and dry.   Psychiatric: She has a normal mood and affect.   Nursing note and vitals reviewed.      Assessment:       1. Insomnia, unspecified type    2. Anxiety    3. Primary insomnia    4. Hypothyroidism, unspecified type    5. Palpitations    6. Irritable bowel syndrome with diarrhea        Plan:       1. Insomnia, unspecified type  Controlled on current medications.  Continue current medications.    - traZODone (DESYREL) 50 MG tablet; Take 1 tablet (50 mg total) by mouth nightly as needed for Insomnia.  Dispense: 30 tablet; Refill: 5    2. Anxiety  Treat  - ALPRAZolam (XANAX) 0.5 MG tablet; Take 1 tablet (0.5 mg  total) by mouth nightly as needed.  Dispense: 30 tablet; Refill: 0    3. Primary insomnia  Treat  - ALPRAZolam (XANAX) 0.5 MG tablet; Take 1 tablet (0.5 mg total) by mouth nightly as needed.  Dispense: 30 tablet; Refill: 0    4. Hypothyroidism, unspecified type  Stable condition.  Continue current medications.  Will adjust based on lab findings or if condition changes.      5. Palpitations  PVCs.  Continue  - metoprolol succinate (TOPROL-XL) 25 MG 24 hr tablet; 1/2 po qd  Dispense: 45 tablet; Refill: 3    6. Irritable bowel syndrome with diarrhea  Counseled patient on IBS diagnosis, treatment options including probiotics, fiber supplements, anti-spasmodics, anti depressants and antibiotics.  Discussed side effects, risks and benefits of each.  Printed materials were given and FODMAPS diet was discussed. Patient elected to proceed     - dicyclomine (BENTYL) 10 MG capsule; Take 1 capsule (10 mg total) by mouth 4 (four) times daily as needed.  Dispense: 40 capsule; Refill: prn    Reeval 6 months or sooner prn

## 2018-07-15 ENCOUNTER — PATIENT MESSAGE (OUTPATIENT)
Dept: ENDOCRINOLOGY | Facility: CLINIC | Age: 58
End: 2018-07-15

## 2018-08-04 ENCOUNTER — PATIENT MESSAGE (OUTPATIENT)
Dept: ENDOCRINOLOGY | Facility: CLINIC | Age: 58
End: 2018-08-04

## 2018-08-04 ENCOUNTER — LAB VISIT (OUTPATIENT)
Dept: LAB | Facility: HOSPITAL | Age: 58
End: 2018-08-04
Attending: INTERNAL MEDICINE
Payer: COMMERCIAL

## 2018-08-04 DIAGNOSIS — E03.9 HYPOTHYROIDISM, UNSPECIFIED TYPE: ICD-10-CM

## 2018-08-04 LAB
T3 SERPL-MCNC: 112 NG/DL
T4 FREE SERPL-MCNC: 1.21 NG/DL
TSH SERPL DL<=0.005 MIU/L-ACNC: 1.62 UIU/ML

## 2018-08-04 PROCEDURE — 36415 COLL VENOUS BLD VENIPUNCTURE: CPT | Mod: PO

## 2018-08-04 PROCEDURE — 84443 ASSAY THYROID STIM HORMONE: CPT

## 2018-08-04 PROCEDURE — 84480 ASSAY TRIIODOTHYRONINE (T3): CPT

## 2018-08-04 PROCEDURE — 84439 ASSAY OF FREE THYROXINE: CPT

## 2018-08-16 ENCOUNTER — TELEPHONE (OUTPATIENT)
Dept: FAMILY MEDICINE | Facility: CLINIC | Age: 58
End: 2018-08-16

## 2018-08-16 NOTE — TELEPHONE ENCOUNTER
----- Message from Ramona Cazares sent at 8/16/2018 10:29 AM CDT -----  Contact: patient  Patient would like to discuss her medication metoprolol succinate (TOPROL-XL) 25 MG 24 hr tablet. Please call [phone]

## 2018-08-20 ENCOUNTER — TELEPHONE (OUTPATIENT)
Dept: ENDOCRINOLOGY | Facility: CLINIC | Age: 58
End: 2018-08-20

## 2018-08-20 ENCOUNTER — PATIENT MESSAGE (OUTPATIENT)
Dept: FAMILY MEDICINE | Facility: CLINIC | Age: 58
End: 2018-08-20

## 2018-08-20 NOTE — TELEPHONE ENCOUNTER
----- Message from Monet Sage sent at 8/16/2018  6:08 PM CDT -----  Contact: Self   Pt missed a phone call from the nurse and would like a call back at her earliest convenience       Pt can be contacted at [phone]

## 2018-08-21 NOTE — TELEPHONE ENCOUNTER
----- Message from Naveed Contreras sent at 8/17/2018  3:12 PM CDT -----  Contact: self   Patient want to speak with a nurse regarding medication please call back at [phone] patient want to speak with a nurse today

## 2018-09-07 ENCOUNTER — OFFICE VISIT (OUTPATIENT)
Dept: ORTHOPEDICS | Facility: CLINIC | Age: 58
End: 2018-09-07
Payer: COMMERCIAL

## 2018-09-07 VITALS — BODY MASS INDEX: 27.49 KG/M2 | HEIGHT: 64 IN | WEIGHT: 161 LBS

## 2018-09-07 DIAGNOSIS — M25.512 ACUTE PAIN OF LEFT SHOULDER: Primary | ICD-10-CM

## 2018-09-07 DIAGNOSIS — M25.512 CHRONIC PAIN OF BOTH SHOULDERS: Primary | ICD-10-CM

## 2018-09-07 DIAGNOSIS — M19.012 PRIMARY OSTEOARTHRITIS OF BOTH SHOULDERS: ICD-10-CM

## 2018-09-07 DIAGNOSIS — G89.29 CHRONIC PAIN OF BOTH SHOULDERS: Primary | ICD-10-CM

## 2018-09-07 DIAGNOSIS — M25.511 CHRONIC PAIN OF BOTH SHOULDERS: Primary | ICD-10-CM

## 2018-09-07 DIAGNOSIS — M19.011 PRIMARY OSTEOARTHRITIS OF BOTH SHOULDERS: ICD-10-CM

## 2018-09-07 PROCEDURE — 99214 OFFICE O/P EST MOD 30 MIN: CPT | Mod: 25,S$GLB,, | Performed by: ORTHOPAEDIC SURGERY

## 2018-09-07 PROCEDURE — 20610 DRAIN/INJ JOINT/BURSA W/O US: CPT | Mod: LT,S$GLB,, | Performed by: ORTHOPAEDIC SURGERY

## 2018-09-07 PROCEDURE — 3008F BODY MASS INDEX DOCD: CPT | Mod: CPTII,S$GLB,, | Performed by: ORTHOPAEDIC SURGERY

## 2018-09-07 PROCEDURE — 99999 PR PBB SHADOW E&M-EST. PATIENT-LVL III: CPT | Mod: PBBFAC,,, | Performed by: ORTHOPAEDIC SURGERY

## 2018-09-07 RX ORDER — TRIAMCINOLONE ACETONIDE 40 MG/ML
80 INJECTION, SUSPENSION INTRA-ARTICULAR; INTRAMUSCULAR
Status: DISCONTINUED | OUTPATIENT
Start: 2018-09-07 | End: 2018-09-07 | Stop reason: HOSPADM

## 2018-09-07 RX ADMIN — TRIAMCINOLONE ACETONIDE 80 MG: 40 INJECTION, SUSPENSION INTRA-ARTICULAR; INTRAMUSCULAR at 12:09

## 2018-09-07 NOTE — PROCEDURES
Large Joint Aspiration/Injection: L subacromial bursa  Date/Time: 9/7/2018 12:29 PM  Performed by: Wali Webb MD  Authorized by: Wali Webb MD     Consent Done?:  Yes (Verbal)  Indications:  Pain  Procedure site marked: Yes    Timeout: Prior to procedure the correct patient, procedure, and site was verified      Location:  Shoulder  Site:  L subacromial bursa  Prep: Patient was prepped and draped in usual sterile fashion    Ultrasonic Guidance for needle placement: No  Needle size:  21 G  Approach:  Posterior  Medications:  80 mg triamcinolone acetonide 40 mg/mL  Patient tolerance:  Patient tolerated the procedure well with no immediate complications

## 2018-09-07 NOTE — PROGRESS NOTES
Dictation #1  MRN:[id number]  CSN:[id number]  Further History  Aching pain  Worse with activity  Relieved with rest  No other associated symptoms  No other radiation    Further Exam  Alert and oriented  Pleasant  Contralateral limb has appropriate range of motion for age and condition  Contralateral limb has appropriate strength for age and condition  Contralateral limb has appropriate stability  for age and condition  No adenopathy  Pulses are appropriate for current condition  Skin is intact        Chief Complaint    Chief Complaint   Patient presents with    Right Shoulder - Pain    Left Shoulder - Pain    Neck - Pain       HPI  Lexi Chaney is a 58 y.o.  female who presents with       Past Medical History  Past Medical History:   Diagnosis Date    Abnormal Pap smear     Arthritis     Cataract     Grave's disease     Grave's disease     IBS (irritable bowel syndrome)     rare    Internal hemorrhoids     followed by Dr. Schilling    Nephrolithiasis     followed by Dr. Pope    Thyroid disease     Vitamin D deficiency        Past Surgical History  Past Surgical History:   Procedure Laterality Date    CATARACT EXTRACTION  11/19/12    right eye (toric)    CATARACT EXTRACTION W/  INTRAOCULAR LENS IMPLANT  11/26/12    left eye (toric)    COLONOSCOPY  ~2005  [facility]    Repeat in 10 years.    DEXA   2010    NORMAL    EXTRACORPOREAL SHOCK WAVE LITHOTRIPSY      EYE SURGERY      tonsillectomy      UPPER GASTROINTESTINAL ENDOSCOPY  12/3/2003  [facility]       Medications  Current Outpatient Medications   Medication Sig    ALPRAZolam (XANAX) 0.5 MG tablet Take 1 tablet (0.5 mg total) by mouth nightly as needed.    cholecalciferol, vitamin D3, 5,000 unit Tab Take 5,000 Units by mouth every other day.    liothyronine (CYTOMEL) 5 MCG Tab Take 1 tablet (5 mcg total) by mouth once daily.    metoprolol succinate (TOPROL-XL) 25 MG 24 hr tablet 1/2 po qd    SYNTHROID 125 mcg tablet 1 tablet 6 days a week and 1/2  tablet on day 7    traZODone (DESYREL) 50 MG tablet Take 1 tablet (50 mg total) by mouth nightly as needed for Insomnia.     No current facility-administered medications for this visit.        Allergies  Review of patient's allergies indicates:   Allergen Reactions    Codeine Nausea And Vomiting    Sulfa (sulfonamide antibiotics) Itching       Family History  Family History   Problem Relation Age of Onset    Hypertension Father     Stroke Father     Heart disease Mother     Hypertension Mother     Heart disease Brother     Hypertension Brother     Diabetes Brother     Amblyopia Neg Hx     Blindness Neg Hx     Cancer Neg Hx     Cataracts Neg Hx     Glaucoma Neg Hx     Macular degeneration Neg Hx     Retinal detachment Neg Hx     Strabismus Neg Hx     Thyroid disease Neg Hx     Anesthesia problems Neg Hx     Clotting disorder Neg Hx        Social History  Social History     Socioeconomic History    Marital status:      Spouse name: Not on file    Number of children: 2    Years of education: Not on file    Highest education level: Not on file   Social Needs    Financial resource strain: Not on file    Food insecurity - worry: Not on file    Food insecurity - inability: Not on file    Transportation needs - medical: Not on file    Transportation needs - non-medical: Not on file   Occupational History    Occupation:      Employer: [employer]   Tobacco Use    Smoking status: Never Smoker    Smokeless tobacco: Never Used   Substance and Sexual Activity    Alcohol use: Yes     Comment: q month    Drug use: No    Sexual activity: Yes     Partners: Male     Birth control/protection: Post-menopausal   Other Topics Concern    Not on file   Social History Narrative    Not on file               Review of Systems     Constitutional: Negative    HENT: Negative  Eyes: Negative  Respiratory: Negative  Cardiovascular: Negative  Musculoskeletal: HPI  Skin:  Negative  Neurological: Negative  Hematological: Negative  Endocrine: Negative                 Physical Exam    There were no vitals filed for this visit.  Body mass index is 27.64 kg/m².  Physical Examination:     General appearance -  well appearing, and in no distress  Mental status - awake  Neck - supple  Chest -  symmetric air entry  Heart - normal rate   Abdomen - soft      Assessment     1. Chronic pain of both shoulders    2. Primary osteoarthritis of both shoulders          Plan

## 2018-09-08 NOTE — PROGRESS NOTES
A 58 years old, left shoulder pain off and on for years.  Therapy seems to help   out a fair amount.  Pain at nighttime when she lies on that side.  Pain with   overactivity, 6/10 on the pain scale.    PHYSICAL EXAMINATION:  Today shows positive Neer and Tao impingement sign.    Cuff strength is weak and painful.  No signs of infection or instability.  Hand   is functioning well.    ASSESSMENT:  Cuff tendinitis, possible cuff tear.    PLAN:  Kenalog injection into the subacromial space of left shoulder.  Physical   therapy.  Follow up as needed.      FE/LAZARO  dd: 09/07/2018 12:32:04 (CDT)  td: 09/08/2018 06:35:26 (CDT)  Doc ID   #[id number]  Job ID #[id number]    CC:

## 2018-09-20 ENCOUNTER — CLINICAL SUPPORT (OUTPATIENT)
Dept: REHABILITATION | Facility: HOSPITAL | Age: 58
End: 2018-09-20
Payer: COMMERCIAL

## 2018-09-20 DIAGNOSIS — M25.611 SHOULDER STIFFNESS, RIGHT: ICD-10-CM

## 2018-09-20 DIAGNOSIS — M25.511 CHRONIC RIGHT SHOULDER PAIN: ICD-10-CM

## 2018-09-20 DIAGNOSIS — R29.898 SHOULDER WEAKNESS: ICD-10-CM

## 2018-09-20 DIAGNOSIS — G89.29 CHRONIC RIGHT SHOULDER PAIN: ICD-10-CM

## 2018-09-20 DIAGNOSIS — M25.512 LEFT SHOULDER PAIN, UNSPECIFIED CHRONICITY: Primary | ICD-10-CM

## 2018-09-20 DIAGNOSIS — M25.612 SHOULDER STIFFNESS, LEFT: ICD-10-CM

## 2018-09-20 PROCEDURE — 97165 OT EVAL LOW COMPLEX 30 MIN: CPT | Mod: PN

## 2018-09-20 NOTE — PLAN OF CARE
"Date: 9-20-18    Time in: 710  Time out: 8    Procedures: eval  Start: 710  Stop: 8    Total untimed minutes: 50  Charges billed # of units: 1    Onset date: left late 2017, right early 2018  Primary dx: b shoulder arthritis  Tx dx: l shoulder pain, stiffness, weakness; r shoulder pain, weakness    Pmhx relevant to primary or tx dx: n/a    Precautions: universal  Prior therapy: r and l with therapy in past  Medications relevant to primary or tx dx: n/a  Nutrition: wnl  Hx of present illness: reports not able to finish r shoulder therapy in recent past due to insurance issues, says l shoulder with symptom onset 6-8 weeks ago, got l shoulder injected at referring md visit for this evaluation  PLOF: full painless use  Social hx: no issues  Fxnl deficits leading to referral: decreased rom, use, and strength with ADL  Patient therapy goals: full painless use    Subjective    Patient states: understanding of HEP importance and general anticipated progression of therapy    Pain: right ache C4, left     Objective    Sensation: wnl  ROM:   r prom er at 0 abd 80   at 45 abd 90    at 90 abd 90;       sidelying ir 80         ir behind back 14" lift off   all capsular  l prom er at 0 abd 80    at 45 abd 90   at  90 abd 90;      sidelying ir  70        ir behind back 14" lift off  ADL: decreased use especially with over shoulder level use  Hand dominance: r  Job:   Duties:Normal self and home care tasks  Tx: on right issued remaining theraband routine since rehab stopped prematurely in past with me (purple depression, protraction; yellow er 0 abd), issued sleeper stretch on left    Assessment    Initial assessment (pertinent findings, problem list and factors affecting outcome): Needs skilled OT to properly promote rom, use, and strength given type, nature, and extent of diagnosis  Rehab potential: good    Goals:   1. Pt. Will be I with HEP 2. Pt. Will have 2/10 pain with light use 3. Pt. Will have = prom of bilateral " shoulders to enhance affected arm use with ADL      1. Pt. Will be I with d/c HEP 2. Pt. Will have 1/10 pain with all use 3. Pt. Will have roughly 3+/5 MMT elevation, er to improve use with ADL                                                                          4. Pt. Will be I with ADL      Plan    Certification period: 9-20-18 to 3-7-19  Recommended tx plan: 3 times a week for 24 weeks; eval and tx  Other recommendations: above visit frequency and duration in above dates may be adjusted based on pt. progress and need for therapy    Therapist: hernán jin cht        eval code grid    Profile and History Assessment of Occupational Performance Level of Clinical Decision Making Complexity Score   Occupational Profile:   Lexi Chaney is a 58 y.o. female who lives with their spouse and is currently employed as a . Lexi Chaney has difficulty with over shoulder level ADL  affecting his/her daily functional abilities. His/her main goal for therapy is full painless use.     Comorbidities:   n/a    Medical and Therapy History Review:   Brief               Performance Deficits    Physical:  Joint Mobility  Muscle Power/Strength  Pain    Cognitive:  No Deficits    Psychosocial:    No Deficits     Clinical Decision Making:  low    Assessment Process:  Problem-Focused Assessments    Modification/Need for Assistance:  n/a    Intervention Selection:  Limited Treatment Options       low  Based on PMHX, co morbidities , data from assessments and functional level of assistance required with task and clinical presentation directly impacting function.

## 2018-10-03 ENCOUNTER — OFFICE VISIT (OUTPATIENT)
Dept: ORTHOPEDICS | Facility: CLINIC | Age: 58
End: 2018-10-03
Payer: COMMERCIAL

## 2018-10-03 VITALS
DIASTOLIC BLOOD PRESSURE: 61 MMHG | WEIGHT: 160.94 LBS | BODY MASS INDEX: 27.48 KG/M2 | HEIGHT: 64 IN | HEART RATE: 68 BPM | SYSTOLIC BLOOD PRESSURE: 126 MMHG

## 2018-10-03 DIAGNOSIS — G89.29 CHRONIC PAIN OF BOTH SHOULDERS: Primary | ICD-10-CM

## 2018-10-03 DIAGNOSIS — M25.512 LEFT SHOULDER PAIN, UNSPECIFIED CHRONICITY: Primary | ICD-10-CM

## 2018-10-03 DIAGNOSIS — M75.51 BURSITIS OF RIGHT SHOULDER: ICD-10-CM

## 2018-10-03 DIAGNOSIS — M25.512 CHRONIC PAIN OF BOTH SHOULDERS: Primary | ICD-10-CM

## 2018-10-03 DIAGNOSIS — M25.511 CHRONIC PAIN OF BOTH SHOULDERS: Primary | ICD-10-CM

## 2018-10-03 PROCEDURE — 3008F BODY MASS INDEX DOCD: CPT | Mod: CPTII,S$GLB,, | Performed by: ORTHOPAEDIC SURGERY

## 2018-10-03 PROCEDURE — 20610 DRAIN/INJ JOINT/BURSA W/O US: CPT | Mod: RT,S$GLB,, | Performed by: ORTHOPAEDIC SURGERY

## 2018-10-03 PROCEDURE — 99213 OFFICE O/P EST LOW 20 MIN: CPT | Mod: 25,S$GLB,, | Performed by: ORTHOPAEDIC SURGERY

## 2018-10-03 PROCEDURE — 99999 PR PBB SHADOW E&M-EST. PATIENT-LVL III: CPT | Mod: PBBFAC,,, | Performed by: ORTHOPAEDIC SURGERY

## 2018-10-03 RX ADMIN — TRIAMCINOLONE ACETONIDE 40 MG: 40 INJECTION, SUSPENSION INTRA-ARTICULAR; INTRAMUSCULAR at 10:10

## 2018-10-04 RX ORDER — TRIAMCINOLONE ACETONIDE 40 MG/ML
40 INJECTION, SUSPENSION INTRA-ARTICULAR; INTRAMUSCULAR
Status: DISCONTINUED | OUTPATIENT
Start: 2018-10-03 | End: 2018-10-05 | Stop reason: HOSPADM

## 2018-10-05 ENCOUNTER — CLINICAL SUPPORT (OUTPATIENT)
Dept: REHABILITATION | Facility: HOSPITAL | Age: 58
End: 2018-10-05
Payer: COMMERCIAL

## 2018-10-05 DIAGNOSIS — M25.511 RIGHT SHOULDER PAIN, UNSPECIFIED CHRONICITY: ICD-10-CM

## 2018-10-05 DIAGNOSIS — M25.612 SHOULDER STIFFNESS, LEFT: ICD-10-CM

## 2018-10-05 DIAGNOSIS — R29.898 SHOULDER WEAKNESS: ICD-10-CM

## 2018-10-05 DIAGNOSIS — M25.611 SHOULDER STIFFNESS, RIGHT: ICD-10-CM

## 2018-10-05 DIAGNOSIS — M25.512 LEFT SHOULDER PAIN, UNSPECIFIED CHRONICITY: Primary | ICD-10-CM

## 2018-10-05 PROCEDURE — 97110 THERAPEUTIC EXERCISES: CPT | Mod: PN

## 2018-10-05 NOTE — PROGRESS NOTES
"Time in 715  Time out 8      Timed units  3 therex                              Time:715-8      S:saw different ortho/doulens since last OT visit since she reports straining her r shoulder, got an injection on r shoulder, rx written for b shoulder rehab  Pain:increased on r, none on l    O:  r prom er at 0 abd 80   at 45 abd 90     at 90 abd 90;       sidelying ir 60         ir behind back 14" lift off   All capsular  l prom er at 0 abd 80    at 45 abd 90   at  90 abd 90;      sidelying ir 80         ir behind back 14" lift off    All capsular    Issued current routine which was discussed and performed today: r sleeper stretch, l purple adduction            A: Continued skilled and structured formal rehab imperative to properly restore strong, painless, and balanced mechanics and full use long term            P:stretching, PRE  "

## 2018-10-05 NOTE — PROGRESS NOTES
Past Medical History:   Diagnosis Date    Abnormal Pap smear     Arthritis     Cataract     Grave's disease     Grave's disease     IBS (irritable bowel syndrome)     rare    Internal hemorrhoids     followed by Dr. Schilling    Nephrolithiasis     followed by Dr. Pope    Thyroid disease     Vitamin D deficiency        Past Surgical History:   Procedure Laterality Date    CATARACT EXTRACTION  11/19/12    right eye (toric)    CATARACT EXTRACTION W/  INTRAOCULAR LENS IMPLANT  11/26/12    left eye (toric)    COLONOSCOPY  ~2005  [place]    Repeat in 10 years.    DEXA   2010    NORMAL    EXTRACORPOREAL SHOCK WAVE LITHOTRIPSY      EYE SURGERY      tonsillectomy      UPPER GASTROINTESTINAL ENDOSCOPY  12/3/2003  [facility]       Current Outpatient Medications   Medication Sig    ALPRAZolam (XANAX) 0.5 MG tablet Take 1 tablet (0.5 mg total) by mouth nightly as needed.    cholecalciferol, vitamin D3, 5,000 unit Tab Take 5,000 Units by mouth every other day.    liothyronine (CYTOMEL) 5 MCG Tab Take 1 tablet (5 mcg total) by mouth once daily.    metoprolol succinate (TOPROL-XL) 25 MG 24 hr tablet 1/2 po qd    SYNTHROID 125 mcg tablet 1 tablet 6 days a week and 1/2 tablet on day 7    traZODone (DESYREL) 50 MG tablet Take 1 tablet (50 mg total) by mouth nightly as needed for Insomnia.     No current facility-administered medications for this visit.        Review of patient's allergies indicates:   Allergen Reactions    Codeine Nausea And Vomiting    Sulfa (sulfonamide antibiotics) Itching       Family History   Problem Relation Age of Onset    Hypertension Father     Stroke Father     Heart disease Mother     Hypertension Mother     Heart disease Brother     Hypertension Brother     Diabetes Brother     Amblyopia Neg Hx     Blindness Neg Hx     Cancer Neg Hx     Cataracts Neg Hx     Glaucoma Neg Hx     Macular degeneration Neg Hx     Retinal detachment Neg Hx     Strabismus Neg Hx     Thyroid  disease Neg Hx     Anesthesia problems Neg Hx     Clotting disorder Neg Hx        Social History     Socioeconomic History    Marital status:      Spouse name: Not on file    Number of children: 2    Years of education: Not on file    Highest education level: Not on file   Social Needs    Financial resource strain: Not on file    Food insecurity - worry: Not on file    Food insecurity - inability: Not on file    Transportation needs - medical: Not on file    Transportation needs - non-medical: Not on file   Occupational History    Occupation: [employer]     Employer: [employer]   Tobacco Use    Smoking status: Never Smoker    Smokeless tobacco: Never Used   Substance and Sexual Activity    Alcohol use: Yes     Comment: q month    Drug use: No    Sexual activity: Yes     Partners: Male     Birth control/protection: Post-menopausal   Other Topics Concern    Not on file   Social History Narrative    Not on file       Chief Complaint:   Chief Complaint   Patient presents with    Right Shoulder - Pain       Consulting Physician: No ref. provider found    History of present illness:    This is a 58 y.o. female who complains of bilateral shoulder pain with right worse. Pain 3/10 and worse with use. Decreased ROM. No injury.    Review of Systems:    Constitution: Denies chills, fever, and sweats.  HENT: Denies headaches or blurry vision.  Cardiovascular: Denies chest pain or irregular heart beat.  Respiratory: Denies cough or shortness of breath.  Gastrointestinal: Denies abdominal pain, nausea, or vomiting.  Musculoskeletal:  Denies muscle cramps.  Neurological: Denies dizziness or focal weakness.  Psychiatric/Behavioral: Normal mental status.  Hematologic/Lymphatic: Denies bleeding problem or easy bruising/bleeding.  Skin: Denies rash or suspicious lesions.    Examination:    Vital Signs:    Vitals:    10/03/18 1533   BP: 126/61   Pulse: 68   Weight: 73 kg (160 lb 15 oz)   Height:  "5' 4" (1.626 m)   PainSc:   3   PainLoc: Shoulder       Body mass index is 27.62 kg/m².    This a well-developed, well nourished patient in no acute distress.    Alert and oriented x 3 and cooperative to examination.       Physical Exam: Right Shoulder Exam     Skin  Rash:   None  Scars:   None    Inspection/Observation   Swelling:   none  Erythema:   none  Bruising:   none  Wounds:   none  Scapular Winging:  none  Scapular Dyskinesia:  mild  Atrophy:   none  Masses:   none  Lymphadenopathy: None    Tenderness   AC Joint:   Mild  Bicep groove:  Mild    Range of Motion   Active Forward Flexion:  180   Active External Rotation:  >45  Active Internal Rotation:  Low Back    Passive Forward Flexion:  180  Passive External Rotation:  >45  Passive Internal Rotation at 90 degrees: Limited    Muscle Strength   Forward Flexion:  5/5  External Rotation:  5/5  Internal Rotation:  5/5    Tests & Signs   Cross Arm:   negative  Hawkin's test:   positive  Impingement:   positive    Other   Sensation:  normal  Pulse:    2+ radial    Left Shoulder Exam     Skin  Rash:   None  Scars:   None    Inspection/Observation   Swelling:   none  Erythema:   none  Bruising:   none  Wounds:   none  Scapular Winging:  none  Scapular Dyskinesia:  mild  Atrophy:   none  Masses:   None  Lymphadenopathy: None    Tenderness   AC Joint:   Mild  Bicep groove:  Mild    Range of Motion   Active Forward Flexion:  180   Active External Rotation:  >45  Active Internal Rotation:  Low Back    Muscle Strength   Forward Flexion:  5/5  External Rotation:  5/5  Internal Rotation:  5/5    Tests & Signs   Cross Arm:   negative  Hawkin's test:   positive  Impingement:   positive    Other   Sensation:  normal  Pulse:    2+ radial            Imaging: X-rays of the right shoulder show mild DJD changes.        Assessment: Chronic pain of both shoulders  -     Large Joint Aspiration/Injection: R subacromial bursa        Plan:  She has some GIRD on right and bursitis on " left so we will inject right and start PT for both. Left injected 9-7-18. Back in 6 weeks.      DISCLAIMER: This note may have been dictated using voice recognition software and may contain grammatical errors.     NOTE: Consult report sent to referring provider via [employer] EMR.

## 2018-10-05 NOTE — PROCEDURES
Large Joint Aspiration/Injection: R subacromial bursa  Date/Time: 10/3/2018 10:15 PM  Performed by: Danny Harmon MD  Authorized by: Danny Harmon MD     Consent Done?:  Yes (Verbal)  Indications:  Pain  Timeout: Prior to procedure the correct patient, procedure, and site was verified      Location:  Shoulder  Site:  R subacromial bursa  Prep: Patient was prepped and draped in usual sterile fashion    Approach:  Lateral  Medications:  40 mg triamcinolone acetonide 40 mg/mL

## 2018-10-19 ENCOUNTER — CLINICAL SUPPORT (OUTPATIENT)
Dept: REHABILITATION | Facility: HOSPITAL | Age: 58
End: 2018-10-19
Attending: ORTHOPAEDIC SURGERY
Payer: COMMERCIAL

## 2018-10-19 DIAGNOSIS — M25.612 SHOULDER STIFFNESS, LEFT: ICD-10-CM

## 2018-10-19 DIAGNOSIS — M25.512 LEFT SHOULDER PAIN, UNSPECIFIED CHRONICITY: Primary | ICD-10-CM

## 2018-10-19 DIAGNOSIS — M25.611 SHOULDER STIFFNESS, RIGHT: ICD-10-CM

## 2018-10-19 DIAGNOSIS — R29.898 SHOULDER WEAKNESS: ICD-10-CM

## 2018-10-19 DIAGNOSIS — M25.511 RIGHT SHOULDER PAIN, UNSPECIFIED CHRONICITY: ICD-10-CM

## 2018-10-19 PROCEDURE — 97110 THERAPEUTIC EXERCISES: CPT | Mod: PN

## 2018-10-19 NOTE — PROGRESS NOTES
Time in 915  Time out 10      Timed units  3 therex                              Time:915-10      S: doing HEP as advised, light use improving, understands rationale of current care  Pain:continues to decrease in intensity and frequency on right, none on left    O:    flexibility test b shoulders same as last visit except prom on r with sleeper stretch is now at 70 vs 80 on l    Left: purple row, ir 0 abd, depression    Right: sleeper stretch as tolerated-pain free    HEP: left- above band exercises issued    A:    Right: diablo effect    Left: scapula and cuff weakness            P:scapula and cuff PRE on left, fix gh stiffness on right then screen prom elevation x 3

## 2018-11-15 ENCOUNTER — CLINICAL SUPPORT (OUTPATIENT)
Dept: REHABILITATION | Facility: HOSPITAL | Age: 58
End: 2018-11-15
Attending: ORTHOPAEDIC SURGERY
Payer: COMMERCIAL

## 2018-11-15 DIAGNOSIS — R29.898 SHOULDER WEAKNESS: ICD-10-CM

## 2018-11-15 DIAGNOSIS — M25.612 SHOULDER STIFFNESS, LEFT: ICD-10-CM

## 2018-11-15 DIAGNOSIS — M25.611 SHOULDER STIFFNESS, RIGHT: ICD-10-CM

## 2018-11-15 DIAGNOSIS — M25.511 RIGHT SHOULDER PAIN, UNSPECIFIED CHRONICITY: ICD-10-CM

## 2018-11-15 DIAGNOSIS — M25.512 LEFT SHOULDER PAIN, UNSPECIFIED CHRONICITY: Primary | ICD-10-CM

## 2018-11-15 PROCEDURE — 97110 THERAPEUTIC EXERCISES: CPT | Mod: PN

## 2018-11-15 NOTE — PROGRESS NOTES
Time in 215  Time out 3      Timed units  3 therex                              Time:215-3      S:no new issues  Pain:continues to decrease in intensity and frequency    O:  Full prom er x 3 and ir x 2 and elevation x 3 bilaterally      Issued and practiced purple protraction and yellow er 0 abd for left    Reviewed previously issued HEP      A: r shoulder complex needs scapula and cuff PRE            P:PRE on r

## 2018-11-29 ENCOUNTER — PATIENT OUTREACH (OUTPATIENT)
Dept: ADMINISTRATIVE | Facility: HOSPITAL | Age: 58
End: 2018-11-29

## 2018-11-29 DIAGNOSIS — Z12.39 SCREENING FOR BREAST CANCER: Primary | ICD-10-CM

## 2018-11-29 NOTE — LETTER
December 10, 2018    Lexi Chaney  [address] [facility] [address]             [facility]  [address]  [facility] [address]  Phone: [phone] Lexi KIRSTEN Chaney,   [address] [address]             [facility] is committed to your overall health.  To help you get the most out of each of your visits, we will review your information to make sure you are up to date on all of your recommended tests and/or procedures.       Dr. Natalie Vázquez MD  has found that your chart shows you may be due for your Mammogram.   If you have had any of the above done at another facility, please bring the records or information with you so that your record at [facility] will be complete.  If you would like to schedule any of these, please contact me.     If you are currently taking medication, please bring it with you to your appointment for review.     Thank you for letting [facility] take care of you r healthcare needs.     If you see a provider from Dr Ty office the results will need to be picked up and brought to us or, you as the patient will have to request the record for your PCP. We have a difficult time getting records from that office.     Your [facility] Team,   MD Kymberly Quinn LPN Clinical Care Coordinator   [facility] [address] [place] [address]   Phone [phone]   Fax [phone]

## 2018-12-06 ENCOUNTER — TELEPHONE (OUTPATIENT)
Dept: FAMILY MEDICINE | Facility: CLINIC | Age: 58
End: 2018-12-06

## 2018-12-06 NOTE — TELEPHONE ENCOUNTER
----- Message from Fatemeh Burroughs sent at 12/6/2018 10:35 AM CST -----  Contact: Lexi  Type:  Sooner Apoointment Request    Caller is requesting a sooner appointment.    Name of Caller:  patient  When is the first available appointment?  12/24/18  Symptoms:  Head congestion/nasal drip/sore throat  Best Call Back Number:  [phone]  Additional Information:  Patient would like to be seen today if possible--if not tomorrow is fine--please advise--thank you

## 2018-12-06 NOTE — TELEPHONE ENCOUNTER
Called patient to set up an appointment, patient states she has an appointment with her ENT tomorrow.

## 2018-12-10 ENCOUNTER — OFFICE VISIT (OUTPATIENT)
Dept: FAMILY MEDICINE | Facility: CLINIC | Age: 58
End: 2018-12-10
Payer: COMMERCIAL

## 2018-12-10 VITALS
BODY MASS INDEX: 28.04 KG/M2 | SYSTOLIC BLOOD PRESSURE: 120 MMHG | DIASTOLIC BLOOD PRESSURE: 80 MMHG | HEIGHT: 64 IN | TEMPERATURE: 100 F | WEIGHT: 164.25 LBS | OXYGEN SATURATION: 99 % | HEART RATE: 88 BPM

## 2018-12-10 DIAGNOSIS — J01.00 ACUTE NON-RECURRENT MAXILLARY SINUSITIS: Primary | ICD-10-CM

## 2018-12-10 PROCEDURE — 3008F BODY MASS INDEX DOCD: CPT | Mod: CPTII,S$GLB,, | Performed by: NURSE PRACTITIONER

## 2018-12-10 PROCEDURE — 99999 PR PBB SHADOW E&M-EST. PATIENT-LVL IV: CPT | Mod: PBBFAC,,, | Performed by: NURSE PRACTITIONER

## 2018-12-10 PROCEDURE — 99214 OFFICE O/P EST MOD 30 MIN: CPT | Mod: S$GLB,,, | Performed by: NURSE PRACTITIONER

## 2018-12-10 RX ORDER — DOXYCYCLINE 100 MG/1
100 CAPSULE ORAL 2 TIMES DAILY
Qty: 20 CAPSULE | Refills: 0 | Status: SHIPPED | OUTPATIENT
Start: 2018-12-10 | End: 2019-01-07

## 2018-12-10 RX ORDER — AMOXICILLIN AND CLAVULANATE POTASSIUM 500; 125 MG/1; MG/1
1 TABLET, FILM COATED ORAL
COMMUNITY
End: 2018-12-10 | Stop reason: ALTCHOICE

## 2018-12-10 NOTE — PROGRESS NOTES
This dictation has been generated using Modal Fluency Dictation some phonetic errors may occur. Please contact author for clarification if needed.     Problem List Items Addressed This Visit     None      Visit Diagnoses     Acute non-recurrent maxillary sinusitis    -  Primary          Orders Placed This Encounter    doxycycline (MONODOX) 100 MG capsule       Sinusitis with facial pain and fever treat with doxy.    Follow-up in about 3 days (around 12/13/2018), or if symptoms worsen or fail to improve.    ________________________________________________________________  ________________________________________________________________      Chief Complaint   Patient presents with    Fever    Chest Congestion     History of present illness  This 58 y.o. presents today for complaint of sinus issues.  Symptoms started about 2 weeks ago with some runny nose and sore throat symptoms.  She notes some yellow nasal mucus.  She has developed fever and chills.  She saw ENT Friday and received an injection of cortisone and started some Augmentin.  She has had 2 doses daily for 3 days.  Since that time she notes her symptoms have worsened.  She ran fever over the weekend.  She has had chest pain with coughing.  She notes temperature max was 100.  Review of systems  Fever chills noted.  No body aches noted.  Patient denies earache  She notes some short of breath and coughing.  Describes chest pain as tightness when coughing.  No nausea vomiting or diarrhea  No rash  No abnormal bruising or bleeding    Past medical and social history reviewed.  Patient new to me.  Follows with in the system  Past Medical History:   Diagnosis Date    Abnormal Pap smear     Arthritis     Cataract     Grave's disease     Grave's disease     IBS (irritable bowel syndrome)     rare    Internal hemorrhoids     followed by Dr. Schilling    Nephrolithiasis     followed by Dr. Pope    Thyroid disease     Vitamin D deficiency        Past Surgical  History:   Procedure Laterality Date    CATARACT EXTRACTION  11/19/12    right eye (toric)    CATARACT EXTRACTION W/  INTRAOCULAR LENS IMPLANT  11/26/12    left eye (toric)    COLONOSCOPY  ~2005  [facility]    Repeat in 10 years.    DEXA   2010    NORMAL    EXTRACORPOREAL SHOCK WAVE LITHOTRIPSY      EYE SURGERY      tonsillectomy      UPPER GASTROINTESTINAL ENDOSCOPY  12/3/2003  [facility]       Family History   Problem Relation Age of Onset    Hypertension Father     Stroke Father     Heart disease Mother     Hypertension Mother     Heart disease Brother     Hypertension Brother     Diabetes Brother     Amblyopia Neg Hx     Blindness Neg Hx     Cancer Neg Hx     Cataracts Neg Hx     Glaucoma Neg Hx     Macular degeneration Neg Hx     Retinal detachment Neg Hx     Strabismus Neg Hx     Thyroid disease Neg Hx     Anesthesia problems Neg Hx     Clotting disorder Neg Hx        Social History     Socioeconomic History    Marital status:      Spouse name: None    Number of children: 2    Years of education: None    Highest education level: None   Social Needs    Financial resource strain: None    Food insecurity - worry: None    Food insecurity - inability: None    Transportation needs - medical: None    Transportation needs - non-medical: None   Occupational History    Occupation: [employer]     Employer: [employer]   Tobacco Use    Smoking status: Never Smoker    Smokeless tobacco: Never Used   Substance and Sexual Activity    Alcohol use: Yes     Comment: q month    Drug use: No    Sexual activity: Yes     Partners: Male     Birth control/protection: Post-menopausal   Other Topics Concern    None   Social History Narrative    None       Current Outpatient Medications   Medication Sig Dispense Refill    ALPRAZolam (XANAX) 0.5 MG tablet Take 1 tablet (0.5 mg total) by mouth nightly as needed. 30 tablet 0    cholecalciferol, vitamin D3, 5,000 unit Tab Take  5,000 Units by mouth every other day.      liothyronine (CYTOMEL) 5 MCG Tab Take 1 tablet (5 mcg total) by mouth once daily. 30 tablet 3    metoprolol succinate (TOPROL-XL) 25 MG 24 hr tablet 1/2 po qd 45 tablet 3    SYNTHROID 125 mcg tablet 1 tablet 6 days a week and 1/2 tablet on day 7 90 tablet 3    doxycycline (MONODOX) 100 MG capsule Take 1 capsule (100 mg total) by mouth 2 (two) times daily. 20 capsule 0     No current facility-administered medications for this visit.        Review of patient's allergies indicates:   Allergen Reactions    Codeine Nausea And Vomiting    Sulfa (sulfonamide antibiotics) Itching       Physical examination  Vitals Reviewed  Gen. Well-dressed well-nourished.  Patient looks sick not septic.    Skin warm dry and intact.  No rashes noted.  HEENT.  TM intact bilateral with normal light reflex.  No mastoid tenderness during percussion.  Nares patent bilateral.  Pharynx is unremarkable.  Maxillary or frontal sinus tenderness when percussed.    Neck is supple without adenopathy  Chest.  Respirations are even unlabored.  Lungs are clear to auscultation.  Cardiac regular rate and rhythm.  No chest wall adenopathy noted.  Neuro. Awake alert oriented x4.  Normal judgment and cognition noted.  Extremities no clubbing cyanosis or edema noted.     Call or return to clinic prn if these symptoms worsen or fail to improve as anticipated.

## 2018-12-18 ENCOUNTER — CLINICAL SUPPORT (OUTPATIENT)
Dept: REHABILITATION | Facility: HOSPITAL | Age: 58
End: 2018-12-18
Attending: ORTHOPAEDIC SURGERY
Payer: COMMERCIAL

## 2018-12-18 DIAGNOSIS — M25.512 LEFT SHOULDER PAIN, UNSPECIFIED CHRONICITY: ICD-10-CM

## 2018-12-18 DIAGNOSIS — M25.611 SHOULDER STIFFNESS, RIGHT: Primary | ICD-10-CM

## 2018-12-18 DIAGNOSIS — R29.898 SHOULDER WEAKNESS: ICD-10-CM

## 2018-12-18 DIAGNOSIS — M25.511 RIGHT SHOULDER PAIN, UNSPECIFIED CHRONICITY: ICD-10-CM

## 2018-12-18 DIAGNOSIS — M25.612 SHOULDER STIFFNESS, LEFT: ICD-10-CM

## 2018-12-18 PROCEDURE — 97110 THERAPEUTIC EXERCISES: CPT | Mod: PN

## 2018-12-18 NOTE — PROGRESS NOTES
Time in 5  Time out 530      Timed units  2 therex                              Time:5-530      S: doing all required tasks  Pain:0/10 at rest, with sleep, with light use    O:  full prom er x 3 and ir x 2 and elevation x 3 vs l    issued band routine for r shoulder (same exercises as issued for l)    practiced r shoulder band HEP, told to resume sleeper stretch on r if needed, told to progress use as tolerated-pain free on r      A: all goals met            P:d/c

## 2019-01-07 ENCOUNTER — OFFICE VISIT (OUTPATIENT)
Dept: FAMILY MEDICINE | Facility: CLINIC | Age: 59
End: 2019-01-07
Payer: COMMERCIAL

## 2019-01-07 VITALS
SYSTOLIC BLOOD PRESSURE: 125 MMHG | TEMPERATURE: 98 F | DIASTOLIC BLOOD PRESSURE: 69 MMHG | WEIGHT: 164.25 LBS | HEART RATE: 64 BPM | BODY MASS INDEX: 28.19 KG/M2

## 2019-01-07 DIAGNOSIS — E03.9 HYPOTHYROIDISM, UNSPECIFIED TYPE: Primary | ICD-10-CM

## 2019-01-07 DIAGNOSIS — Z12.39 SCREENING FOR BREAST CANCER: ICD-10-CM

## 2019-01-07 DIAGNOSIS — F51.01 PRIMARY INSOMNIA: ICD-10-CM

## 2019-01-07 DIAGNOSIS — F41.9 ANXIETY: ICD-10-CM

## 2019-01-07 PROCEDURE — 99999 PR PBB SHADOW E&M-EST. PATIENT-LVL III: CPT | Mod: PBBFAC,,, | Performed by: NURSE PRACTITIONER

## 2019-01-07 PROCEDURE — 3008F BODY MASS INDEX DOCD: CPT | Mod: CPTII,S$GLB,, | Performed by: NURSE PRACTITIONER

## 2019-01-07 PROCEDURE — 99214 PR OFFICE/OUTPT VISIT, EST, LEVL IV, 30-39 MIN: ICD-10-PCS | Mod: S$GLB,,, | Performed by: NURSE PRACTITIONER

## 2019-01-07 PROCEDURE — 3008F PR BODY MASS INDEX (BMI) DOCUMENTED: ICD-10-PCS | Mod: CPTII,S$GLB,, | Performed by: NURSE PRACTITIONER

## 2019-01-07 PROCEDURE — 99214 OFFICE O/P EST MOD 30 MIN: CPT | Mod: S$GLB,,, | Performed by: NURSE PRACTITIONER

## 2019-01-07 PROCEDURE — 99999 PR PBB SHADOW E&M-EST. PATIENT-LVL III: ICD-10-PCS | Mod: PBBFAC,,, | Performed by: NURSE PRACTITIONER

## 2019-01-07 RX ORDER — DICYCLOMINE HYDROCHLORIDE 20 MG/1
20 TABLET ORAL
COMMUNITY
End: 2019-09-17 | Stop reason: SDUPTHER

## 2019-01-08 RX ORDER — ALPRAZOLAM 0.5 MG/1
0.5 TABLET ORAL NIGHTLY PRN
Qty: 30 TABLET | Refills: 0 | Status: SHIPPED | OUTPATIENT
Start: 2019-01-08 | End: 2019-09-27 | Stop reason: SDUPTHER

## 2019-01-08 NOTE — PROGRESS NOTES
Subjective:       Patient ID: Lexi Chaney is a 58 y.o. female.    Chief Complaint: Thyroid Problem (fu)    Ms. Chaney presents to the clinic today for six month follow up for hypothyroidism, anxiety, insomnia. She sees Endocrinology for hypothyroidism and her last thyroid labs were normal.  She reports she has been taking Xanax occasionally for the past 3 years when she can't sleep. She tried trazodone but this caused her heart to race so she stopped taking it--reports she took it on two separate occasions and this happened both times.  She has taken benadryl and melatonin in the past.  She reports she has also taken flexeril for insomnia in the past and this worked for about one year.  There is a lot of noise outside her house at night.  She was seen by Dr. Quinones for palpitations and states he concluded this was due to anxiety/stress.  The metoprolol helps.      Review of Systems   Constitutional: Negative for activity change and unexpected weight change.   HENT: Negative for hearing loss, rhinorrhea and trouble swallowing.    Eyes: Negative for discharge and visual disturbance.   Respiratory: Negative for chest tightness and wheezing.    Cardiovascular: Negative for chest pain and palpitations.   Gastrointestinal: Negative for blood in stool, constipation, diarrhea and vomiting.   Endocrine: Negative for polydipsia and polyuria.   Genitourinary: Negative for difficulty urinating, dysuria and hematuria.   Musculoskeletal: Negative for arthralgias, joint swelling and neck pain.   Neurological: Negative for weakness and headaches.   Psychiatric/Behavioral: Positive for sleep disturbance. Negative for confusion and dysphoric mood.       Objective:      Physical Exam   Constitutional: She is oriented to person, place, and time. She appears well-nourished. No distress.   HENT:   Head: Normocephalic and atraumatic.   Right Ear: External ear normal.   Left Ear: External ear normal.   Mouth/Throat: Oropharynx is  clear and moist. No oropharyngeal exudate.   Eyes: Pupils are equal, round, and reactive to light. Right eye exhibits no discharge. Left eye exhibits no discharge.   Neck: Neck supple. No thyromegaly present.   Cardiovascular: Normal rate and regular rhythm. Exam reveals no gallop and no friction rub.   No murmur heard.  Pulmonary/Chest: Effort normal and breath sounds normal. No respiratory distress. She has no wheezes. She has no rales.   Abdominal: Soft. She exhibits no distension. There is no tenderness.   Lymphadenopathy:     She has no cervical adenopathy.   Neurological: She is alert and oriented to person, place, and time. Coordination normal.   Skin: Skin is warm and dry.   Psychiatric: She has a normal mood and affect. Her behavior is normal. Thought content normal.   Vitals reviewed.          Current Outpatient Medications:     dicyclomine (BENTYL) 20 mg tablet, Take 20 mg by mouth every 6 (six) hours., Disp: , Rfl:     ALPRAZolam (XANAX) 0.5 MG tablet, Take 1 tablet (0.5 mg total) by mouth nightly as needed., Disp: 30 tablet, Rfl: 0    cholecalciferol, vitamin D3, 5,000 unit Tab, Take 5,000 Units by mouth every other day., Disp: , Rfl:     liothyronine (CYTOMEL) 5 MCG Tab, Take 1 tablet (5 mcg total) by mouth once daily., Disp: 30 tablet, Rfl: 3    metoprolol succinate (TOPROL-XL) 25 MG 24 hr tablet, 1/2 po qd, Disp: 45 tablet, Rfl: 3    SYNTHROID 125 mcg tablet, 1 tablet 6 days a week and 1/2 tablet on day 7, Disp: 90 tablet, Rfl: 3  Assessment:       1. Hypothyroidism, unspecified type    2. Anxiety    3. Primary insomnia    4. Screening for breast cancer        Plan:       Hypothyroidism, unspecified type  Stable, follow up Endocrinology.    Anxiety  Recommended meditation.  She requests refill of Xanax which I will send to her PCP.  She only takes this occasionally.    Primary insomnia  Refill request for Xanax to PCP.    Screening for breast cancer  -     Mammo Digital Screening Bilat; Future;  Expected date: 01/07/2019    She requests to wait for any labs until her insurance switches to a new company.  RTC 6 mos with PCP.

## 2019-01-08 NOTE — TELEPHONE ENCOUNTER
Patient seen today, requests refill of Xanax for sleep.  She tried the trazodone and states this caused heart racing so she does not want to take it anymore.  Has tried OTC benadryl, melatonin.

## 2019-01-25 ENCOUNTER — OFFICE VISIT (OUTPATIENT)
Dept: FAMILY MEDICINE | Facility: CLINIC | Age: 59
End: 2019-01-25
Payer: COMMERCIAL

## 2019-01-25 VITALS
DIASTOLIC BLOOD PRESSURE: 72 MMHG | SYSTOLIC BLOOD PRESSURE: 119 MMHG | WEIGHT: 162.94 LBS | HEART RATE: 98 BPM | OXYGEN SATURATION: 98 % | TEMPERATURE: 99 F | HEIGHT: 64 IN | BODY MASS INDEX: 27.82 KG/M2

## 2019-01-25 DIAGNOSIS — J32.9 RECURRENT SINUSITIS: Primary | ICD-10-CM

## 2019-01-25 DIAGNOSIS — R05.9 COUGH: ICD-10-CM

## 2019-01-25 DIAGNOSIS — J02.9 SORE THROAT: ICD-10-CM

## 2019-01-25 DIAGNOSIS — J30.9 ALLERGIC RHINITIS, UNSPECIFIED SEASONALITY, UNSPECIFIED TRIGGER: ICD-10-CM

## 2019-01-25 PROCEDURE — 3008F PR BODY MASS INDEX (BMI) DOCUMENTED: ICD-10-PCS | Mod: CPTII,S$GLB,, | Performed by: NURSE PRACTITIONER

## 2019-01-25 PROCEDURE — 99213 PR OFFICE/OUTPT VISIT, EST, LEVL III, 20-29 MIN: ICD-10-PCS | Mod: S$GLB,,, | Performed by: NURSE PRACTITIONER

## 2019-01-25 PROCEDURE — 3008F BODY MASS INDEX DOCD: CPT | Mod: CPTII,S$GLB,, | Performed by: NURSE PRACTITIONER

## 2019-01-25 PROCEDURE — 99213 OFFICE O/P EST LOW 20 MIN: CPT | Mod: S$GLB,,, | Performed by: NURSE PRACTITIONER

## 2019-01-25 PROCEDURE — 99999 PR PBB SHADOW E&M-EST. PATIENT-LVL IV: CPT | Mod: PBBFAC,,, | Performed by: NURSE PRACTITIONER

## 2019-01-25 PROCEDURE — 99999 PR PBB SHADOW E&M-EST. PATIENT-LVL IV: ICD-10-PCS | Mod: PBBFAC,,, | Performed by: NURSE PRACTITIONER

## 2019-01-25 RX ORDER — LORATADINE 10 MG/1
10 TABLET ORAL DAILY
Refills: 3 | COMMUNITY
Start: 2019-01-25 | End: 2019-03-06

## 2019-01-25 RX ORDER — LEVOFLOXACIN 500 MG/1
500 TABLET, FILM COATED ORAL DAILY
Qty: 14 TABLET | Refills: 0 | Status: SHIPPED | OUTPATIENT
Start: 2019-01-25 | End: 2019-02-08

## 2019-01-25 RX ORDER — BENZONATATE 200 MG/1
200 CAPSULE ORAL 2 TIMES DAILY PRN
Qty: 14 CAPSULE | Refills: 0 | Status: SHIPPED | OUTPATIENT
Start: 2019-01-25 | End: 2019-02-01

## 2019-01-25 RX ORDER — FLUTICASONE PROPIONATE 50 MCG
1 SPRAY, SUSPENSION (ML) NASAL DAILY
Qty: 1 BOTTLE | Refills: 3 | Status: SHIPPED | OUTPATIENT
Start: 2019-01-25 | End: 2019-04-03

## 2019-01-25 RX ORDER — MONTELUKAST SODIUM 10 MG/1
10 TABLET ORAL NIGHTLY
Qty: 30 TABLET | Refills: 3 | Status: SHIPPED | OUTPATIENT
Start: 2019-01-25 | End: 2019-03-06

## 2019-01-25 NOTE — PROGRESS NOTES
Subjective:       Patient ID: Lexi Chaney is a 58 y.o. female.    Chief Complaint: Sore Throat (fever highest 100 )    Ms. Chaney presents today for evaluation of recurrent sinusitis. She was seen by an ENT in early December and prescribed Augmentin. She was on this for four days and then went to the [facility] clinic in [place] because she had no improvement in symptoms. Augmentin was stopped, doxy prescribed with steroid injection. She took the antibiotic in its entirety and symptoms resolved. She reports that she developed the same symptoms at the beginning of the week and they have progressively become worse. Symptoms include fatigue, dry cough, thick, yellow nasal discharge, sinus pain and pressure, and post-nasal drip. Not taking medications at home for symptoms. Had chills last night, low grade temp of 100.0 this morning. 99.4 here today. Has never tried medication for allergies.       Sinusitis   This is a recurrent problem. The current episode started in the past 7 days. The problem has been gradually worsening since onset. There has been no fever. Her pain is at a severity of 0/10. She is experiencing no pain. Associated symptoms include chills, congestion, coughing, sinus pressure and a sore throat. Pertinent negatives include no headaches, shortness of breath or swollen glands. Past treatments include nothing. The treatment provided no relief.     Patient Active Problem List   Diagnosis    Insomnia    Anxiety    Migraines    DJD (degenerative joint disease) of knee    Nephrolithiasis    Hypothyroidism    Plantar fasciitis    Metatarsalgia    Equinus deformity of foot, acquired    GERD (gastroesophageal reflux disease)    Shoulder pain    Vitamin D deficiency    Irritable bowel syndrome without diarrhea    Right shoulder pain    Shoulder stiffness, right    Shoulder weakness    Left shoulder pain    Shoulder stiffness, left     Review of Systems   Constitutional: Positive for  chills, fatigue and fever.   HENT: Positive for congestion, postnasal drip, rhinorrhea, sinus pressure, sinus pain and sore throat.    Respiratory: Positive for cough. Negative for shortness of breath and wheezing.    Cardiovascular: Negative for chest pain and palpitations.   Gastrointestinal: Positive for constipation (IBS) and diarrhea (IBS).   Musculoskeletal: Negative for myalgias.   Skin: Negative for pallor.   Neurological: Negative for dizziness and headaches.       Objective:      Physical Exam   Constitutional: She is oriented to person, place, and time. Vital signs are normal. She appears well-developed and well-nourished.   HENT:   Head: Normocephalic and atraumatic.   Right Ear: Tympanic membrane, external ear and ear canal normal.   Left Ear: Tympanic membrane, external ear and ear canal normal.   Nose: Mucosal edema and rhinorrhea present. Right sinus exhibits maxillary sinus tenderness and frontal sinus tenderness. Left sinus exhibits maxillary sinus tenderness and frontal sinus tenderness.   Mouth/Throat: Posterior oropharyngeal edema present. No oropharyngeal exudate or posterior oropharyngeal erythema.   Eyes: Conjunctivae and lids are normal. Pupils are equal, round, and reactive to light.   Neck: Normal range of motion. Neck supple.   Cardiovascular: Normal rate and regular rhythm.   Pulmonary/Chest: Effort normal and breath sounds normal. No tachypnea. No respiratory distress.   Abdominal: Soft. Normal appearance and bowel sounds are normal.   Musculoskeletal: Normal range of motion.   Neurological: She is alert and oriented to person, place, and time.   Skin: Skin is warm, dry and intact.   Psychiatric: She has a normal mood and affect.   Vitals reviewed.      Assessment:       1. Recurrent sinusitis    2. Sore throat    3. Cough    4. Allergic rhinitis, unspecified seasonality, unspecified trigger        Plan:       Lexi CURTIS was seen today for sore throat.    Diagnoses and all orders for this  visit:    Recurrent sinusitis  -     Ambulatory referral to ENT  -     CT Sinuses without Contrast; Future  -     levoFLOXacin (LEVAQUIN) 500 MG tablet; Take 1 tablet (500 mg total) by mouth once daily. for 14 days    Sore throat  Declined strep swab  Discussed slat water gargles, hot liquids with honey for symptomatic relief     Cough  -     benzonatate (TESSALON) 200 MG capsule; Take 1 capsule (200 mg total) by mouth 2 (two) times daily as needed for Cough.    Allergic rhinitis, unspecified seasonality, unspecified trigger  -     fluticasone (FLONASE) 50 mcg/actuation nasal spray; 1 spray (50 mcg total) by Each Nare route once daily.  -     loratadine (CLARITIN) 10 mg tablet; Take 1 tablet (10 mg total) by mouth once daily.  -     montelukast (SINGULAIR) 10 mg tablet; Take 1 tablet (10 mg total) by mouth every evening.  Recommend otc non-sedating antihistamine such as Loratadine and/or steroid nasal spray such as Flonase as directed and as needed.  Please return to clinic if symptoms persist after these interventions.      F/U offered, patient would like to see ENT before scheduling F/U; encouraged to notify clinic if symptom worsen or do not improve

## 2019-03-06 ENCOUNTER — OFFICE VISIT (OUTPATIENT)
Dept: FAMILY MEDICINE | Facility: CLINIC | Age: 59
End: 2019-03-06
Payer: COMMERCIAL

## 2019-03-06 VITALS
HEART RATE: 85 BPM | BODY MASS INDEX: 28.53 KG/M2 | TEMPERATURE: 99 F | OXYGEN SATURATION: 98 % | SYSTOLIC BLOOD PRESSURE: 117 MMHG | WEIGHT: 167.13 LBS | DIASTOLIC BLOOD PRESSURE: 70 MMHG | HEIGHT: 64 IN

## 2019-03-06 DIAGNOSIS — J32.9 RECURRENT SINUSITIS: ICD-10-CM

## 2019-03-06 DIAGNOSIS — J10.1 INFLUENZA A: Primary | ICD-10-CM

## 2019-03-06 DIAGNOSIS — R50.9 FEVER, UNSPECIFIED FEVER CAUSE: ICD-10-CM

## 2019-03-06 LAB
INFLUENZA A, MOLECULAR: POSITIVE
INFLUENZA B, MOLECULAR: NEGATIVE
SPECIMEN SOURCE: ABNORMAL

## 2019-03-06 PROCEDURE — 3008F BODY MASS INDEX DOCD: CPT | Mod: CPTII,S$GLB,, | Performed by: NURSE PRACTITIONER

## 2019-03-06 PROCEDURE — 99999 PR PBB SHADOW E&M-EST. PATIENT-LVL IV: CPT | Mod: PBBFAC,,, | Performed by: NURSE PRACTITIONER

## 2019-03-06 PROCEDURE — 99999 PR PBB SHADOW E&M-EST. PATIENT-LVL IV: ICD-10-PCS | Mod: PBBFAC,,, | Performed by: NURSE PRACTITIONER

## 2019-03-06 PROCEDURE — 99212 OFFICE O/P EST SF 10 MIN: CPT | Mod: S$GLB,,, | Performed by: NURSE PRACTITIONER

## 2019-03-06 PROCEDURE — 3008F PR BODY MASS INDEX (BMI) DOCUMENTED: ICD-10-PCS | Mod: CPTII,S$GLB,, | Performed by: NURSE PRACTITIONER

## 2019-03-06 PROCEDURE — 99212 PR OFFICE/OUTPT VISIT, EST, LEVL II, 10-19 MIN: ICD-10-PCS | Mod: S$GLB,,, | Performed by: NURSE PRACTITIONER

## 2019-03-06 PROCEDURE — 87502 INFLUENZA DNA AMP PROBE: CPT | Mod: PO

## 2019-03-06 RX ORDER — DIPHENHYDRAMINE HCL 25 MG
25 CAPSULE ORAL NIGHTLY PRN
COMMUNITY

## 2019-03-06 RX ORDER — CETIRIZINE HYDROCHLORIDE 10 MG/1
10 TABLET ORAL DAILY
COMMUNITY
End: 2019-04-03

## 2019-03-06 RX ORDER — OSELTAMIVIR PHOSPHATE 75 MG/1
75 CAPSULE ORAL 2 TIMES DAILY
Qty: 14 CAPSULE | Refills: 0 | Status: SHIPPED | OUTPATIENT
Start: 2019-03-06 | End: 2019-03-06

## 2019-03-06 RX ORDER — OSELTAMIVIR PHOSPHATE 75 MG/1
75 CAPSULE ORAL 2 TIMES DAILY
Qty: 14 CAPSULE | Refills: 0 | Status: SHIPPED | OUTPATIENT
Start: 2019-03-06 | End: 2019-03-13

## 2019-03-06 NOTE — PROGRESS NOTES
Subjective:       Patient ID: Lexi Chaney is a 58 y.o. female.    Chief Complaint: Fever (since monday)    Symptoms began Monday.  had similar symptoms last week, but they have since improved. He did not seek medical treatment and did not have fever. Ms. Chaney has tried sudafed and tylenol, both provide relief of headache and fever. Fever 102 this morning. She was referred to ENT for recurrent sinusitis at the last visit, but has since switched insurance. She needs to see a physician within the [facility] network. Has not started Claritin or flonase or allergies. Does not want to take singular, as she read about the side effects.       Influenza   This is a new problem. The current episode started in the past 7 days. The problem occurs constantly. The problem has been waxing and waning. Associated symptoms include arthralgias, chills, congestion, coughing, diaphoresis, fatigue, a fever, headaches, myalgias and nausea. Pertinent negatives include no chest pain, sore throat or swollen glands. She has tried acetaminophen for the symptoms. The treatment provided moderate relief.     Patient Active Problem List   Diagnosis    Insomnia    Anxiety    Migraines    DJD (degenerative joint disease) of knee    Nephrolithiasis    Hypothyroidism    Plantar fasciitis    Metatarsalgia    Equinus deformity of foot, acquired    GERD (gastroesophageal reflux disease)    Shoulder pain    Vitamin D deficiency    Irritable bowel syndrome without diarrhea    Right shoulder pain    Shoulder stiffness, right    Shoulder weakness    Left shoulder pain    Shoulder stiffness, left     Review of Systems   Constitutional: Positive for chills, diaphoresis, fatigue and fever.   HENT: Positive for congestion and rhinorrhea. Negative for sinus pressure, sinus pain and sore throat.    Respiratory: Positive for cough. Negative for shortness of breath and wheezing.    Cardiovascular: Negative for chest pain and  palpitations.   Gastrointestinal: Positive for nausea.   Musculoskeletal: Positive for arthralgias and myalgias.   Neurological: Positive for headaches.       Objective:      Physical Exam   Constitutional: She is oriented to person, place, and time. She appears well-developed and well-nourished.   HENT:   Nose: Rhinorrhea present.   Neck: Neck supple.   Cardiovascular: Normal rate, regular rhythm and normal heart sounds.   Pulmonary/Chest: Effort normal and breath sounds normal.   Lymphadenopathy:     She has no cervical adenopathy.   Neurological: She is alert and oriented to person, place, and time.   Skin: Skin is warm and dry.   Psychiatric: She has a normal mood and affect.   Nursing note and vitals reviewed.      Assessment:       1. Influenza A    2. Fever, unspecified fever cause    3. Recurrent sinusitis        Plan:       Lexi CURTIS was seen today for fever.    Diagnoses and all orders for this visit:    Influenza A  -     oseltamivir (TAMIFLU) 75 MG capsule; Take 1 capsule (75 mg total) by mouth 2 (two) times daily. for 7 days  Symptomatic relief- tylenol PRN for fever, may use steam/ saline nasal spray for nasal congestion  May take zyrtec and flonase for allergies  Notify clinic if symptoms faily to improve    Fever, unspecified fever cause  -     Influenza A & B by Molecular  See above    Recurrent sinusitis  -     Ambulatory referral to ENT    F/U PRN

## 2019-04-03 ENCOUNTER — OFFICE VISIT (OUTPATIENT)
Dept: OTOLARYNGOLOGY | Facility: CLINIC | Age: 59
End: 2019-04-03
Payer: COMMERCIAL

## 2019-04-03 ENCOUNTER — TELEPHONE (OUTPATIENT)
Dept: ENDOCRINOLOGY | Facility: CLINIC | Age: 59
End: 2019-04-03

## 2019-04-03 VITALS — WEIGHT: 163.13 LBS | BODY MASS INDEX: 27.85 KG/M2 | HEIGHT: 64 IN

## 2019-04-03 DIAGNOSIS — J01.91 ACUTE RECURRENT SINUSITIS, UNSPECIFIED LOCATION: Primary | ICD-10-CM

## 2019-04-03 PROCEDURE — 99999 PR PBB SHADOW E&M-EST. PATIENT-LVL III: ICD-10-PCS | Mod: PBBFAC,,, | Performed by: OTOLARYNGOLOGY

## 2019-04-03 PROCEDURE — 99244 PR OFFICE CONSULTATION,LEVEL IV: ICD-10-PCS | Mod: S$GLB,,, | Performed by: OTOLARYNGOLOGY

## 2019-04-03 PROCEDURE — 99244 OFF/OP CNSLTJ NEW/EST MOD 40: CPT | Mod: S$GLB,,, | Performed by: OTOLARYNGOLOGY

## 2019-04-03 PROCEDURE — 99999 PR PBB SHADOW E&M-EST. PATIENT-LVL III: CPT | Mod: PBBFAC,,, | Performed by: OTOLARYNGOLOGY

## 2019-04-03 RX ORDER — FLUTICASONE PROPIONATE 50 MCG
2 SPRAY, SUSPENSION (ML) NASAL DAILY
Qty: 1 BOTTLE | Refills: 12 | Status: SHIPPED | OUTPATIENT
Start: 2019-04-03 | End: 2019-07-19

## 2019-04-03 NOTE — LETTER
April 3, 2019      [place]  [address] [facility] [address]           [place] [facility]  [address] [facility] [address] [place] [address]  Phone: [phone]  Fax: [phone]          Patient: Lexi Chaney   MR Number: [id number]   YOB: 1960   Date of Visit: 4/3/2019       Dear Melisa Mills:    Thank you for referring Lexi Chaney to me for evaluation. Attached you will find relevant portions of my assessment and plan of care.    If you have questions, please do not hesitate to call me. I look forward to following Lexi Chaney along with you.    Sincerely,    Ellis Matthews MD    Enclosure  CC:  No Recipients    If you would like to receive this communication electronically, please contact [email or web address] or [phone] to request more information on [employer] access.    For providers and/or their staff who would like to refer a patient to [facility], please contact us through our one-stop-shop provider referral line, [facility], at [phone].    If you feel you have received this communication in error or would no longer like to receive these types of communications, please e-mail [email or web address]

## 2019-04-03 NOTE — PROGRESS NOTES
Subjective:       Patient ID: Lexi Chaney is a 58 y.o. female.    Chief Complaint: Sinus Problem    Lexi CURTIS is here for sinus/nasal complaints. Symptoms have been present for 5 months.   Baseline is OK but has periods of severe symptoms that last > 10 days and may or may not resolve with antibiotics. She was doing well for a period of time with infrequent sinus infections but then have worsened significantly over past 5 months. Has had 4-5 infections over this time needing antibiotics and steroids  Post-nasal drainage: yes  Pressure: yes  Congestion: yes  Decreased sense of smell: yes  Therapies tried: multiple rounds of antibiotics and steroids. Nasal saline, not regular Flonase as of now.    Allergy testing: no  History of asthma: no  Anticoagulation: none   Pertinent meds: Cytomel, metoprolol  Pertinent surgical history: No sinus surgery, Tonsillectomy   Pertinent medical issues: Graves disease - JOHNS    [id number]    Social History     Tobacco Use   Smoking Status Never Smoker   Smokeless Tobacco Never Used     Social History     Substance and Sexual Activity   Alcohol Use Yes    Comment: q month        Review of Systems   Constitutional: Negative for activity change and appetite change.   Eyes: Negative for discharge.   Respiratory: Negative for difficulty breathing and wheezing   Cardiovascular: Negative for chest pain.   Gastrointestinal: Negative for abdominal distention and abdominal pain.   Endocrine: Negative for cold intolerance and heat intolerance.   Genitourinary: Negative for dysuria.   Musculoskeletal: Negative for gait problem and joint swelling.   Skin: Negative for color change and pallor.   Neurological: Negative for syncope and weakness.   Psychiatric/Behavioral: Negative for agitation and confusion.     Objective:        Constitutional:   She is oriented to person, place, and time. She appears well-developed and well-nourished. She appears alert. She is active. Normal speech.       Head:  Normocephalic and atraumatic. Head is without TMJ tenderness. No scars. Salivary glands normal.  Facial strength is normal.      Ears:    Right Ear: No drainage or swelling. No middle ear effusion.   Left Ear: No drainage or swelling.  No middle ear effusion.     Nose:  Septal deviation (mild) present. No mucosal edema, rhinorrhea or sinus tenderness. No turbinate hypertrophy.      Mouth/Throat  Oropharynx clear and moist without lesions or asymmetry, normal uvula midline and mirror exam normal. Normal dentition. No uvula swelling, lacerations or trismus. No oropharyngeal exudate. Tonsillar erythema, tonsillar exudate.      Neck:  Full range of motion with neck supple and no adenopathy. Thyroid tenderness is present. No tracheal deviation, no edema, no erythema, normal range of motion, no stridor, no crepitus and no neck rigidity present. No thyroid mass present.     Cardiovascular:   Intact distal pulses and normal pulses.      Pulmonary/Chest:   Effort normal and breath sounds normal. No stridor.     Psychiatric:   Her speech is normal and behavior is normal. Her mood appears not anxious. Her affect is not labile.     Neurological:   She is alert and oriented to person, place, and time. No sensory deficit.     Skin:   No abrasions, lacerations, lesions, or rashes. No abrasion and no bruising noted.         Tests / Results:  None    Assessment:       1. Acute recurrent sinusitis, unspecified location          Plan:         Add Flonase regularly to see if this improves her recent uptick in symptoms. Discussed nasal saline irrigations as well.   When she feels another infection coming on, she will call and we will plan on CT Sinus on short notice with follow-up with me same day  FU 4 months if otherwise doing well and can consider weaning Flonase at that time if no infections

## 2019-04-03 NOTE — TELEPHONE ENCOUNTER
----- Message from Bridgette Cramer sent at 4/3/2019  9:24 AM CDT -----  Contact: Self  Patient accidentally accepted an earlier appt for this morning at 9:30 and she can't see him today because she has another appt.  She wants her old appt back on 9/20 at 8:30 AM.  Please call patient back to confirm at [phone]. Thanks

## 2019-04-03 NOTE — PATIENT INSTRUCTIONS
Call me when you are having a sinus infection. We will schedule CT scan and see you same day as the scan.    Nasal Steroid Spray    You have been prescribed or instructed to take a nasal steroid spray. Examples of this medication include Flonase (fluticasone), Nasacort (triamcinolone), and Rhinocort (budeosnide). Some symptoms will experience relief within 1-2 days; however, it may take other side effects 2-3 weeks to begin to see improvement. This medication needs to be taken consistently to see results.    Use as directed, spraying 1-2 times in each nostril per day.     Helpful hints for maximizing medication into the nose  - Use the opposite hand to spray the nostril (example: right hand for left nostril). This will help avoid spraying the medication onto the septum (the area that divides the left and right nasal cavity.)  - Tilt the bottle so that it is facing at a slight angle up or straight back, but avoid pointing the bottle straight up while spraying.   - Sniff in while you are spraying.    Side effects:  Overall, this is a well tolerated medication with low side effects. The benefit of nasal steroids as opposed to oral steroids is that the nasal steroid works primarily in the nose.  Common side effects: headache, nasal dryness, minor nose bleeds,   Rare side effects: septal perforation, elevated eye pressures, dry eyes, change in smell, allergic reaction.  Notify your doctor if you have any concerns or experience these symptoms.

## 2019-05-14 RX ORDER — LIOTHYRONINE SODIUM 5 UG/1
TABLET ORAL
Qty: 30 TABLET | Refills: 1 | Status: SHIPPED | OUTPATIENT
Start: 2019-05-14 | End: 2019-06-05 | Stop reason: SDUPTHER

## 2019-05-16 RX ORDER — LEVOTHYROXINE SODIUM 125 UG/1
TABLET ORAL
Qty: 157 TABLET | Refills: 0 | Status: SHIPPED | OUTPATIENT
Start: 2019-05-16 | End: 2019-12-04 | Stop reason: SDUPTHER

## 2019-05-19 ENCOUNTER — PATIENT MESSAGE (OUTPATIENT)
Dept: ENDOCRINOLOGY | Facility: CLINIC | Age: 59
End: 2019-05-19

## 2019-05-20 DIAGNOSIS — E03.9 HYPOTHYROIDISM, UNSPECIFIED TYPE: Primary | ICD-10-CM

## 2019-06-05 RX ORDER — LIOTHYRONINE SODIUM 5 UG/1
TABLET ORAL
Qty: 30 TABLET | Refills: 0 | Status: SHIPPED | OUTPATIENT
Start: 2019-06-05 | End: 2019-12-22 | Stop reason: SDUPTHER

## 2019-06-19 DIAGNOSIS — M25.511 RIGHT SHOULDER PAIN, UNSPECIFIED CHRONICITY: Primary | ICD-10-CM

## 2019-07-08 ENCOUNTER — TELEPHONE (OUTPATIENT)
Dept: ORTHOPEDICS | Facility: CLINIC | Age: 59
End: 2019-07-08

## 2019-07-08 NOTE — TELEPHONE ENCOUNTER
----- Message from Suzy Cooley sent at 7/8/2019 11:47 AM CDT -----  Type: Needs Medical Advice    Who Called:  Self   Symptoms (please be specific):  NA How long has patient had these symptoms:    Pharmacy name and phone #:    Best Call Back Number:[phone]  Additional Information:Patient is scheduled for appointment with the doctor Friday 07/12/2019. Patient requesting to schedule x-ray appointment.

## 2019-07-08 NOTE — TELEPHONE ENCOUNTER
Called patient, no answer. LVM advising her Xray will be done the day of her appointment before she see the doctor. Confirmed appointment for 7/12/19 at 11:00am. Advised to call back if needed.

## 2019-07-10 RX ORDER — LIOTHYRONINE SODIUM 5 UG/1
TABLET ORAL
Qty: 30 TABLET | Refills: 1 | Status: SHIPPED | OUTPATIENT
Start: 2019-07-10 | End: 2019-08-11 | Stop reason: SDUPTHER

## 2019-07-17 DIAGNOSIS — M25.512 LEFT SHOULDER PAIN, UNSPECIFIED CHRONICITY: Primary | ICD-10-CM

## 2019-07-19 ENCOUNTER — HOSPITAL ENCOUNTER (OUTPATIENT)
Dept: RADIOLOGY | Facility: HOSPITAL | Age: 59
Discharge: HOME | End: 2019-07-19
Attending: ORTHOPAEDIC SURGERY
Payer: COMMERCIAL

## 2019-07-19 ENCOUNTER — OFFICE VISIT (OUTPATIENT)
Dept: ORTHOPEDICS | Facility: CLINIC | Age: 59
End: 2019-07-19
Payer: COMMERCIAL

## 2019-07-19 VITALS — BODY MASS INDEX: 27.83 KG/M2 | RESPIRATION RATE: 20 BRPM | WEIGHT: 163 LBS | HEIGHT: 64 IN

## 2019-07-19 DIAGNOSIS — M25.512 LEFT SHOULDER PAIN, UNSPECIFIED CHRONICITY: ICD-10-CM

## 2019-07-19 DIAGNOSIS — M25.512 LEFT SHOULDER PAIN, UNSPECIFIED CHRONICITY: Primary | ICD-10-CM

## 2019-07-19 PROCEDURE — 99213 OFFICE O/P EST LOW 20 MIN: CPT | Mod: 25,S$GLB,, | Performed by: ORTHOPAEDIC SURGERY

## 2019-07-19 PROCEDURE — 3008F BODY MASS INDEX DOCD: CPT | Mod: CPTII,S$GLB,, | Performed by: ORTHOPAEDIC SURGERY

## 2019-07-19 PROCEDURE — 73030 X-RAY EXAM OF SHOULDER: CPT | Mod: 26,LT,, | Performed by: RADIOLOGY

## 2019-07-19 PROCEDURE — 20610 DRAIN/INJ JOINT/BURSA W/O US: CPT | Mod: LT,S$GLB,, | Performed by: ORTHOPAEDIC SURGERY

## 2019-07-19 PROCEDURE — 99999 PR PBB SHADOW E&M-EST. PATIENT-LVL III: ICD-10-PCS | Mod: PBBFAC,,, | Performed by: ORTHOPAEDIC SURGERY

## 2019-07-19 PROCEDURE — 73030 X-RAY EXAM OF SHOULDER: CPT | Mod: TC,PN,LT

## 2019-07-19 PROCEDURE — 20610 LARGE JOINT ASPIRATION/INJECTION: L SUBACROMIAL BURSA: ICD-10-PCS | Mod: LT,S$GLB,, | Performed by: ORTHOPAEDIC SURGERY

## 2019-07-19 PROCEDURE — 3008F PR BODY MASS INDEX (BMI) DOCUMENTED: ICD-10-PCS | Mod: CPTII,S$GLB,, | Performed by: ORTHOPAEDIC SURGERY

## 2019-07-19 PROCEDURE — 99999 PR PBB SHADOW E&M-EST. PATIENT-LVL III: CPT | Mod: PBBFAC,,, | Performed by: ORTHOPAEDIC SURGERY

## 2019-07-19 PROCEDURE — 99213 PR OFFICE/OUTPT VISIT, EST, LEVL III, 20-29 MIN: ICD-10-PCS | Mod: 25,S$GLB,, | Performed by: ORTHOPAEDIC SURGERY

## 2019-07-19 PROCEDURE — 73030 XR SHOULDER TRAUMA 3 VIEW LEFT: ICD-10-PCS | Mod: 26,LT,, | Performed by: RADIOLOGY

## 2019-07-19 RX ADMIN — TRIAMCINOLONE ACETONIDE 40 MG: 40 INJECTION, SUSPENSION INTRA-ARTICULAR; INTRAMUSCULAR at 08:07

## 2019-07-20 RX ORDER — TRIAMCINOLONE ACETONIDE 40 MG/ML
40 INJECTION, SUSPENSION INTRA-ARTICULAR; INTRAMUSCULAR
Status: DISCONTINUED | OUTPATIENT
Start: 2019-07-19 | End: 2019-07-20 | Stop reason: HOSPADM

## 2019-07-21 NOTE — PROGRESS NOTES
Past Medical History:   Diagnosis Date    Abnormal Pap smear     Arthritis     Cataract     Grave's disease     Grave's disease     IBS (irritable bowel syndrome)     rare    Internal hemorrhoids     followed by Dr. Schilling    Nephrolithiasis     followed by Dr. Poep    Thyroid disease     Vitamin D deficiency        Past Surgical History:   Procedure Laterality Date    CATARACT EXTRACTION  11/19/12    right eye (toric)    CATARACT EXTRACTION W/  INTRAOCULAR LENS IMPLANT  11/26/12    left eye (toric)    COLONOSCOPY  ~2005  [facility]    Repeat in 10 years.    DEXA   2010    NORMAL    EXTRACORPOREAL SHOCK WAVE LITHOTRIPSY      EYE SURGERY      tonsillectomy      UPPER GASTROINTESTINAL ENDOSCOPY  12/3/2003  [facility]       Current Outpatient Medications   Medication Sig    ALPRAZolam (XANAX) 0.5 MG tablet Take 1 tablet (0.5 mg total) by mouth nightly as needed.    cholecalciferol, vitamin D3, 5,000 unit Tab Take 5,000 Units by mouth every other day.    dicyclomine (BENTYL) 20 mg tablet Take 20 mg by mouth as needed.     diphenhydrAMINE (BENADRYL) 25 mg capsule Take 25 mg by mouth nightly as needed for Allergies or Insomnia.    liothyronine (CYTOMEL) 5 MCG Tab TAKE 1 TABLET BY MOUTH EVERY DAY    metoprolol succinate (TOPROL-XL) 25 MG 24 hr tablet 1/2 po qd    SYNTHROID 125 mcg tablet TAKE 1 TABLET BY MOUTH EVERY DAY FOR 6 DAYS PER WEEK AND A HALF-TABLET ON DAY 7    liothyronine (CYTOMEL) 5 MCG Tab TAKE 1 TABLET BY MOUTH EVERY DAY     No current facility-administered medications for this visit.        Review of patient's allergies indicates:   Allergen Reactions    Codeine Nausea And Vomiting    Sulfa (sulfonamide antibiotics) Itching       Family History   Problem Relation Age of Onset    Hypertension Father     Stroke Father     Heart disease Mother     Hypertension Mother     Heart disease Brother     Hypertension Brother     Diabetes Brother     Amblyopia Neg Hx     Blindness Neg Hx      Cancer Neg Hx     Cataracts Neg Hx     Glaucoma Neg Hx     Macular degeneration Neg Hx     Retinal detachment Neg Hx     Strabismus Neg Hx     Thyroid disease Neg Hx     Anesthesia problems Neg Hx     Clotting disorder Neg Hx        Social History     Socioeconomic History    Marital status:      Spouse name: Not on file    Number of children: 2    Years of education: Not on file    Highest education level: Not on file   Occupational History    Occupation: [employer]     Employer: [employer]   Social Needs    Financial resource strain: Not on file    Food insecurity:     Worry: Not on file     Inability: Not on file    Transportation needs:     Medical: Not on file     Non-medical: Not on file   Tobacco Use    Smoking status: Never Smoker    Smokeless tobacco: Never Used   Substance and Sexual Activity    Alcohol use: Yes     Comment: q month    Drug use: No    Sexual activity: Yes     Partners: Male     Birth control/protection: Post-menopausal   Lifestyle    Physical activity:     Days per week: Not on file     Minutes per session: Not on file    Stress: Not on file   Relationships    Social connections:     Talks on phone: Not on file     Gets together: Not on file     Attends Ep[employer] service: Not on file     Active member of club or organization: Not on file     Attends meetings of clubs or organizations: Not on file     Relationship status: Not on file   Other Topics Concern    Not on file   Social History Narrative    Not on file       Chief Complaint:   Chief Complaint   Patient presents with    Left Shoulder - Pain       Consulting Physician: No ref. provider found    History of present illness:    This is a 58 y.o. female who complains of left shoulder pain. Pain 6/10 and worse with use. Decreased ROM. No injury.    Review of Systems:    Constitution: Denies chills, fever, and sweats.  HENT: Denies headaches or blurry vision.  Cardiovascular: Denies  "chest pain or irregular heart beat.  Respiratory: Denies cough or shortness of breath.  Gastrointestinal: Denies abdominal pain, nausea, or vomiting.  Musculoskeletal:  Denies muscle cramps.  Neurological: Denies dizziness or focal weakness.  Psychiatric/Behavioral: Normal mental status.  Hematologic/Lymphatic: Denies bleeding problem or easy bruising/bleeding.  Skin: Denies rash or suspicious lesions.    Examination:    Vital Signs:    Vitals:    07/19/19 1000   Resp: 20   Weight: 73.9 kg (163 lb)   Height: 5' 4" (1.626 m)   PainSc:   6   PainLoc: Shoulder       Body mass index is 27.98 kg/m².    This a well-developed, well nourished patient in no acute distress.    Alert and oriented x 3 and cooperative to examination.       Physical Exam:     Left Shoulder Exam     Skin  Rash:   None  Scars:   None    Inspection/Observation   Swelling:   none  Erythema:   none  Bruising:   none  Wounds:   none  Scapular Winging:  none  Scapular Dyskinesia:  mild  Atrophy:   none  Masses:   None  Lymphadenopathy: None    Tenderness   AC Joint:   Mild  Bicep groove:  Mild    Range of Motion   Active Forward Flexion:  180   Active External Rotation:  >45  Active Internal Rotation:  Low Back    Muscle Strength   Forward Flexion:  5/5  External Rotation:  5/5  Internal Rotation:  5/5    Tests & Signs   Cross Arm:   negative  Hawkin's test:   positive  Impingement:   positive    Other   Sensation:  normal  Pulse:    2+ radial            Imaging:      Assessment: Left shoulder pain, unspecified chronicity  -     Large Joint Aspiration/Injection: L subacromial bursa        Plan:  She has some bursitis on left so we will inject. Patient elected for physician provided exercise program which was given to them today along with instruction.      DISCLAIMER: This note may have been dictated using voice recognition software and may contain grammatical errors.     NOTE: Consult report sent to referring provider via EP[facility] EMR.  "

## 2019-07-21 NOTE — PROCEDURES
Large Joint Aspiration/Injection: L subacromial bursa  Date/Time: 7/19/2019 8:09 PM  Performed by: Danny Harmon MD  Authorized by: Danny Harmon MD     Consent Done?:  Yes (Verbal)  Indications:  Pain  Timeout: Prior to procedure the correct patient, procedure, and site was verified      Location:  Shoulder  Site:  L subacromial bursa  Prep: Patient was prepped and draped in usual sterile fashion    Approach:  Lateral  Medications:  40 mg triamcinolone acetonide 40 mg/mL

## 2019-07-25 ENCOUNTER — CLINICAL SUPPORT (OUTPATIENT)
Dept: OPHTHALMOLOGY | Facility: CLINIC | Age: 59
End: 2019-07-25
Payer: COMMERCIAL

## 2019-07-25 NOTE — PROGRESS NOTES
PTOSIS VF AND EXT PIX DONE OU     PATIENT DENIES ANY ALLERGIES TO EYE PATCH/LATEX     JTHOMAS       DR DEVINE PATEINT

## 2019-08-12 RX ORDER — LIOTHYRONINE SODIUM 5 UG/1
TABLET ORAL
Qty: 30 TABLET | Refills: 3 | Status: SHIPPED | OUTPATIENT
Start: 2019-08-12 | End: 2019-11-12 | Stop reason: SDUPTHER

## 2019-09-03 ENCOUNTER — TELEPHONE (OUTPATIENT)
Dept: ENDOCRINOLOGY | Facility: CLINIC | Age: 59
End: 2019-09-03

## 2019-09-03 ENCOUNTER — TELEPHONE (OUTPATIENT)
Dept: FAMILY MEDICINE | Facility: CLINIC | Age: 59
End: 2019-09-03

## 2019-09-03 NOTE — TELEPHONE ENCOUNTER
----- Message from Suzy Cooley sent at 9/3/2019 11:48 AM CDT -----  Type:  Sooner Apoointment Request    Caller is requesting a sooner appointment.  Caller declined first available appointment listed below.  Caller will not accept being placed on the waitlist and is requesting a message be sent to doctor.    Name of Caller:  Self When is the first available appointment?    Symptoms:  Best Call Back Number:  [phone]  Additional Information:  Patient's  was advised by the doctor, that the doctor would see the above listed patient.

## 2019-09-03 NOTE — TELEPHONE ENCOUNTER
Spoke to pt and rescheduled lab appt to a fri as req. Pt was also worried that her script would exp before appt,. Adv we will refill her script until her appt.

## 2019-09-03 NOTE — TELEPHONE ENCOUNTER
----- Message from Suzy Cooley sent at 9/3/2019 11:53 AM CDT -----  Type: Needs Medical Advice    Who Called:  Self Symptoms (please be specific):    How long has patient had these symptoms:    Pharmacy name and phone #:  Best Call Back Number:[phone]  Additional Information: Patient had to reschedule appointment for next year, pt asking to discuss rx refills, should pt reschedule lab appointment.

## 2019-09-03 NOTE — TELEPHONE ENCOUNTER
----- Message from Anahy Lindsay sent at 9/3/2019  3:59 PM CDT -----  Type:  Patient Returning Call    Who Called:  Patient  Who Left Message for Patient:  Murali  Does the patient know what this is regarding?:  Change in prescriptions  Best Call Back Number:  [phone]

## 2019-09-03 NOTE — TELEPHONE ENCOUNTER
Called pt back and left voicemail informing pt labs are scheduled for 3/11/19 in [place] a week prior to Dr. Coker's appt on 3/18/19  Appt letter mailed  Advised pt to call back with any Rx questions

## 2019-09-11 ENCOUNTER — TELEPHONE (OUTPATIENT)
Dept: GASTROENTEROLOGY | Facility: CLINIC | Age: 59
End: 2019-09-11

## 2019-09-11 NOTE — TELEPHONE ENCOUNTER
Called pt to inform pt that her appt on 9/17 was the soonest, pt is placed on the wait list for any cancellations. Pt understands.

## 2019-09-11 NOTE — TELEPHONE ENCOUNTER
----- Message from Fatemeh Burroughs sent at 9/11/2019  9:12 AM CDT -----  Contact: Lexi  Type:  Sooner Apoointment Request    Caller is requesting a sooner appointment.  Caller declined first available appointment listed below.  Caller will not accept being placed on the waitlist and is requesting a message be sent to doctor.    Name of Caller:  patient  When is the first available appointment?  9/17 appt already scheduled  Symptoms:  Abdominal pain, bloating  Best Call Back Number: [phone]  Additional Information:  Patient hasn't been able to eat much without stomach bloating--would like to be seen this week if possible

## 2019-09-17 ENCOUNTER — OFFICE VISIT (OUTPATIENT)
Dept: GASTROENTEROLOGY | Facility: CLINIC | Age: 59
End: 2019-09-17
Payer: COMMERCIAL

## 2019-09-17 VITALS
DIASTOLIC BLOOD PRESSURE: 69 MMHG | WEIGHT: 161.38 LBS | BODY MASS INDEX: 27.7 KG/M2 | SYSTOLIC BLOOD PRESSURE: 125 MMHG | HEART RATE: 71 BPM

## 2019-09-17 DIAGNOSIS — R14.0 ABDOMINAL BLOATING: Primary | ICD-10-CM

## 2019-09-17 DIAGNOSIS — R10.32 LLQ PAIN: ICD-10-CM

## 2019-09-17 PROCEDURE — 3008F BODY MASS INDEX DOCD: CPT | Mod: CPTII,S$GLB,, | Performed by: INTERNAL MEDICINE

## 2019-09-17 PROCEDURE — 99999 PR PBB SHADOW E&M-EST. PATIENT-LVL III: CPT | Mod: PBBFAC,,, | Performed by: INTERNAL MEDICINE

## 2019-09-17 PROCEDURE — 3008F PR BODY MASS INDEX (BMI) DOCUMENTED: ICD-10-PCS | Mod: CPTII,S$GLB,, | Performed by: INTERNAL MEDICINE

## 2019-09-17 PROCEDURE — 99205 PR OFFICE/OUTPT VISIT, NEW, LEVL V, 60-74 MIN: ICD-10-PCS | Mod: S$GLB,,, | Performed by: INTERNAL MEDICINE

## 2019-09-17 PROCEDURE — 99205 OFFICE O/P NEW HI 60 MIN: CPT | Mod: S$GLB,,, | Performed by: INTERNAL MEDICINE

## 2019-09-17 PROCEDURE — 99999 PR PBB SHADOW E&M-EST. PATIENT-LVL III: ICD-10-PCS | Mod: PBBFAC,,, | Performed by: INTERNAL MEDICINE

## 2019-09-17 RX ORDER — DICYCLOMINE HYDROCHLORIDE 20 MG/1
20 TABLET ORAL EVERY 8 HOURS PRN
Qty: 30 TABLET | Refills: 3 | Status: SHIPPED | OUTPATIENT
Start: 2019-09-17 | End: 2021-08-12 | Stop reason: SDUPTHER

## 2019-09-17 NOTE — PROGRESS NOTES
[facility]     CC: Abdominal pain    HPI 59 y.o. female presents for evaluation of 2 weeks of moderate, primarily post-prandial, lower abdominal pain associated with bloating, not associated with diarrhea, fevers, chills, nausea or vomiting. Her symptoms are slowly improving. She had noted mild constipation. Her last colonoscopy was performed by Dr Gil in 2016, notable for hemorrhoids and diverticulosis. She notes a previous episode of lower abdominal pain, bloating and diarrhea in 2017 at which time she was diagnosed with vibrio infection and treated with Doxycycline. She has no family history of colon cancer. She does drink 1-2 gallons of milk per week.       Past Medical History:   Diagnosis Date    Abnormal Pap smear     Arthritis     Cataract     Grave's disease     Grave's disease     IBS (irritable bowel syndrome)     rare    Internal hemorrhoids     followed by Dr. Schilling    Nephrolithiasis     followed by Dr. Pope    Thyroid disease     Vitamin D deficiency        Past Surgical History:   Procedure Laterality Date    CATARACT EXTRACTION  11/19/12    right eye (toric)    CATARACT EXTRACTION W/  INTRAOCULAR LENS IMPLANT  11/26/12    left eye (toric)    COLONOSCOPY  ~2005  [facility]    Repeat in 10 years.    DEXA   2010    NORMAL    EXTRACORPOREAL SHOCK WAVE LITHOTRIPSY      EYE SURGERY      tonsillectomy      UPPER GASTROINTESTINAL ENDOSCOPY  12/3/2003  [facility]       Social History     Tobacco Use    Smoking status: Never Smoker    Smokeless tobacco: Never Used   Substance Use Topics    Alcohol use: Yes     Comment: q month    Drug use: No       Family History   Problem Relation Age of Onset    Hypertension Father     Stroke Father     Heart disease Mother     Hypertension Mother     Heart disease Brother     Hypertension Brother     Diabetes Brother     Amblyopia Neg Hx     Blindness Neg Hx     Cancer Neg Hx     Cataracts Neg Hx     Glaucoma Neg Hx      Macular degeneration Neg Hx     Retinal detachment Neg Hx     Strabismus Neg Hx     Thyroid disease Neg Hx     Anesthesia problems Neg Hx     Clotting disorder Neg Hx        Review of Systems  General ROS: negative for - chills, fever or weight loss  Psychological ROS: negative for - hallucination, depression or suicidal ideation  Ophthalmic ROS: negative for - blurry vision, photophobia or eye pain  ENT ROS: negative for - epistaxis, sore throat or rhinorrhea  Respiratory ROS: no cough, shortness of breath, or wheezing  Cardiovascular ROS: no chest pain or dyspnea on exertion  Gastrointestinal ROS: + abdominal pain, + bloating, no vomiting, + mild constipation  Genito-Urinary ROS: no dysuria, trouble voiding, or hematuria  Musculoskeletal ROS: negative for - arthralgia, myalgia, weakness  Neurological ROS: no syncope or seizures; no ataxia  Dermatological ROS: negative for pruritis, rash and jaundice    Physical Examination  /69   Pulse 71   Wt 73.2 kg (161 lb 6 oz)   BMI 27.70 kg/m²   General appearance: alert, cooperative, no distress  HENT: Normocephalic, atraumatic, neck symmetrical, no nasal discharge   Eyes: conjunctivae/corneas clear, PERRL, EOM's intact, sclera anicteric  Lungs: clear to auscultation bilaterally, no dullness to percussion bilaterally, symmetric expansion, breathing unlabored  Heart: regular rate and rhythm without rub; no displacement of the PMI   Abdomen: soft, mild ttp across lower abdomen without rebound or guarding, mild distention, no masses  Extremities: extremities symmetric; no clubbing, cyanosis, or edema  Integument: Skin color, texture, turgor normal; no rashes; hair distrubution normal, no jaundice  Neurologic: Alert and oriented X 3, no focal sensory or motor neurologic deficits  Psychiatric: no pressured speech; normal affect; no evidence of impaired cognition, no anxiety/depression     Labs:  Lab Results   Component Value Date    WBC 5.9 02/02/2018    HGB 14.0  02/02/2018    HCT 41.5 02/02/2018    MCV 91 02/02/2018     02/02/2018         Imaging:  Abdominal ultrasound  was independently visualized and reviewed by me and showed normal findings    Assessment:   59 y.o. female presents for evaluation of lower abdominal pain and bloating, ? Diverticulitis ? Lactose intolerance ? Gallbladder dysfunction    Plan:  -CBC, CMP  -CT abdomen  -Bentyl PRN  -Lactose free diet  -If above unremarkable consider trial of IBgard      Karen Quiroz MD  [facility]  [address]  [place] [address]  Office: [phone]  Fax: [phone]

## 2019-09-27 ENCOUNTER — HOSPITAL ENCOUNTER (OUTPATIENT)
Dept: RADIOLOGY | Facility: HOSPITAL | Age: 59
Discharge: HOME | End: 2019-09-27
Attending: INTERNAL MEDICINE
Payer: COMMERCIAL

## 2019-09-27 ENCOUNTER — OFFICE VISIT (OUTPATIENT)
Dept: FAMILY MEDICINE | Facility: CLINIC | Age: 59
End: 2019-09-27
Payer: COMMERCIAL

## 2019-09-27 VITALS
TEMPERATURE: 98 F | DIASTOLIC BLOOD PRESSURE: 86 MMHG | HEIGHT: 64 IN | WEIGHT: 163.13 LBS | HEART RATE: 78 BPM | BODY MASS INDEX: 27.85 KG/M2 | OXYGEN SATURATION: 96 % | SYSTOLIC BLOOD PRESSURE: 118 MMHG

## 2019-09-27 DIAGNOSIS — E55.9 VITAMIN D DEFICIENCY: ICD-10-CM

## 2019-09-27 DIAGNOSIS — F41.9 ANXIETY: ICD-10-CM

## 2019-09-27 DIAGNOSIS — Z00.00 PREVENTATIVE HEALTH CARE: Primary | ICD-10-CM

## 2019-09-27 DIAGNOSIS — F51.01 PRIMARY INSOMNIA: ICD-10-CM

## 2019-09-27 DIAGNOSIS — E78.5 HYPERLIPIDEMIA, UNSPECIFIED HYPERLIPIDEMIA TYPE: ICD-10-CM

## 2019-09-27 DIAGNOSIS — R00.2 PALPITATIONS: ICD-10-CM

## 2019-09-27 DIAGNOSIS — R10.32 LLQ PAIN: ICD-10-CM

## 2019-09-27 PROCEDURE — 74177 CT ABD & PELVIS W/CONTRAST: CPT | Mod: 26,,, | Performed by: RADIOLOGY

## 2019-09-27 PROCEDURE — 25500020 PHARM REV CODE 255: Performed by: INTERNAL MEDICINE

## 2019-09-27 PROCEDURE — 74177 CT ABDOMEN PELVIS WITH CONTRAST: ICD-10-PCS | Mod: 26,,, | Performed by: RADIOLOGY

## 2019-09-27 PROCEDURE — 99999 PR PBB SHADOW E&M-EST. PATIENT-LVL IV: CPT | Mod: PBBFAC,,, | Performed by: FAMILY MEDICINE

## 2019-09-27 PROCEDURE — 99396 PR PREVENTIVE VISIT,EST,40-64: ICD-10-PCS | Mod: S$GLB,,, | Performed by: FAMILY MEDICINE

## 2019-09-27 PROCEDURE — 99999 PR PBB SHADOW E&M-EST. PATIENT-LVL IV: ICD-10-PCS | Mod: PBBFAC,,, | Performed by: FAMILY MEDICINE

## 2019-09-27 PROCEDURE — 74177 CT ABD & PELVIS W/CONTRAST: CPT | Mod: TC

## 2019-09-27 PROCEDURE — 99396 PREV VISIT EST AGE 40-64: CPT | Mod: S$GLB,,, | Performed by: FAMILY MEDICINE

## 2019-09-27 RX ORDER — ALPRAZOLAM 0.5 MG/1
0.5 TABLET ORAL NIGHTLY PRN
Qty: 30 TABLET | Refills: 0 | Status: SHIPPED | OUTPATIENT
Start: 2019-09-27 | End: 2020-03-19 | Stop reason: SDUPTHER

## 2019-09-27 RX ADMIN — IOHEXOL 30 ML: 350 INJECTION, SOLUTION INTRAVENOUS at 11:09

## 2019-09-27 RX ADMIN — IOHEXOL 75 ML: 350 INJECTION, SOLUTION INTRAVENOUS at 11:09

## 2019-09-30 ENCOUNTER — TELEPHONE (OUTPATIENT)
Dept: GASTROENTEROLOGY | Facility: CLINIC | Age: 59
End: 2019-09-30

## 2019-09-30 ENCOUNTER — PATIENT MESSAGE (OUTPATIENT)
Dept: GASTROENTEROLOGY | Facility: CLINIC | Age: 59
End: 2019-09-30

## 2019-09-30 NOTE — TELEPHONE ENCOUNTER
Patient has questions on CT results. PCP told her colon had abundance of stools and suggested SennaKot. Medication not effective. Scheduled 10/10/19 appointment for procedure review and plan, but she wants to be seen sooner. Please advise.

## 2019-09-30 NOTE — TELEPHONE ENCOUNTER
----- Message from Belle Dallas sent at 9/30/2019  3:11 PM CDT -----  Type: Needs Medical Advice    Who Called:  Patient   Best Call Back Number: [phone]  Additional Information: patient is requesting a return call concerning her my chart message, and discuss issues with her CT scan    Thank you

## 2019-10-01 ENCOUNTER — PATIENT MESSAGE (OUTPATIENT)
Dept: FAMILY MEDICINE | Facility: CLINIC | Age: 59
End: 2019-10-01

## 2019-10-03 RX ORDER — LACTULOSE 10 G/15ML
20 SOLUTION ORAL 2 TIMES DAILY PRN
Qty: 600 ML | Refills: 0 | Status: SHIPPED | OUTPATIENT
Start: 2019-10-03 | End: 2019-10-13

## 2019-10-04 ENCOUNTER — LAB VISIT (OUTPATIENT)
Dept: LAB | Facility: HOSPITAL | Age: 59
End: 2019-10-04
Attending: FAMILY MEDICINE
Payer: COMMERCIAL

## 2019-10-04 ENCOUNTER — PATIENT MESSAGE (OUTPATIENT)
Dept: GASTROENTEROLOGY | Facility: CLINIC | Age: 59
End: 2019-10-04

## 2019-10-04 DIAGNOSIS — E78.5 HYPERLIPIDEMIA, UNSPECIFIED HYPERLIPIDEMIA TYPE: ICD-10-CM

## 2019-10-04 DIAGNOSIS — E03.9 HYPOTHYROIDISM, UNSPECIFIED TYPE: ICD-10-CM

## 2019-10-04 DIAGNOSIS — E55.9 VITAMIN D DEFICIENCY: ICD-10-CM

## 2019-10-04 LAB
25(OH)D3+25(OH)D2 SERPL-MCNC: 34 NG/ML (ref 30–96)
CHOLEST SERPL-MCNC: 215 MG/DL (ref 120–199)
CHOLEST/HDLC SERPL: 3.3 {RATIO} (ref 2–5)
HDLC SERPL-MCNC: 66 MG/DL (ref 40–75)
HDLC SERPL: 30.7 % (ref 20–50)
LDLC SERPL CALC-MCNC: 131.8 MG/DL (ref 63–159)
NONHDLC SERPL-MCNC: 149 MG/DL
T3 SERPL-MCNC: 83 NG/DL (ref 60–180)
T4 FREE SERPL-MCNC: 1.09 NG/DL (ref 0.71–1.51)
TRIGL SERPL-MCNC: 86 MG/DL (ref 30–150)
TSH SERPL DL<=0.005 MIU/L-ACNC: 1.02 UIU/ML (ref 0.4–4)

## 2019-10-04 PROCEDURE — 84480 ASSAY TRIIODOTHYRONINE (T3): CPT

## 2019-10-04 PROCEDURE — 84439 ASSAY OF FREE THYROXINE: CPT

## 2019-10-04 PROCEDURE — 80061 LIPID PANEL: CPT

## 2019-10-04 PROCEDURE — 82306 VITAMIN D 25 HYDROXY: CPT

## 2019-10-04 PROCEDURE — 84443 ASSAY THYROID STIM HORMONE: CPT

## 2019-10-04 PROCEDURE — 36415 COLL VENOUS BLD VENIPUNCTURE: CPT | Mod: PO

## 2019-10-07 ENCOUNTER — TELEPHONE (OUTPATIENT)
Dept: ENDOCRINOLOGY | Facility: CLINIC | Age: 59
End: 2019-10-07

## 2019-10-07 ENCOUNTER — PATIENT OUTREACH (OUTPATIENT)
Dept: ADMINISTRATIVE | Facility: OTHER | Age: 59
End: 2019-10-07

## 2019-10-08 ENCOUNTER — OFFICE VISIT (OUTPATIENT)
Dept: OPHTHALMOLOGY | Facility: CLINIC | Age: 59
End: 2019-10-08
Payer: COMMERCIAL

## 2019-10-08 DIAGNOSIS — E05.00 GRAVES' ORBITOPATHY: ICD-10-CM

## 2019-10-08 DIAGNOSIS — H02.401 PTOSIS OF RIGHT EYELID: Primary | ICD-10-CM

## 2019-10-08 PROCEDURE — 99999 PR PBB SHADOW E&M-EST. PATIENT-LVL II: CPT | Mod: PBBFAC,,, | Performed by: OPHTHALMOLOGY

## 2019-10-08 PROCEDURE — 99999 PR PBB SHADOW E&M-EST. PATIENT-LVL II: ICD-10-PCS | Mod: PBBFAC,,, | Performed by: OPHTHALMOLOGY

## 2019-10-08 PROCEDURE — 92082 INTERMEDIATE VISUAL FIELD XM: CPT | Mod: S$GLB,,, | Performed by: OPHTHALMOLOGY

## 2019-10-08 PROCEDURE — 92014 COMPRE OPH EXAM EST PT 1/>: CPT | Mod: S$GLB,,, | Performed by: OPHTHALMOLOGY

## 2019-10-08 PROCEDURE — 92014 PR EYE EXAM, EST PATIENT,COMPREHESV: ICD-10-PCS | Mod: S$GLB,,, | Performed by: OPHTHALMOLOGY

## 2019-10-08 PROCEDURE — 92082 GOLDMANN PERIMETRY - OU - BOTH EYES: ICD-10-PCS | Mod: S$GLB,,, | Performed by: OPHTHALMOLOGY

## 2019-10-08 NOTE — PROGRESS NOTES
"HPI     DLS 07/25/19 by Dr.P. Theodore MD    60 YO F here today for GVF review for the treatment Graves Disease and ck   Ptosis OD. peggy     NOTE: was here on last visit and was unable to see Dr. Jaimes had to leave.   PT today for completion of last visit. PT didn't want testing repeated   just want to see Dr. woods     Eye Meds: AT's  "Severe" qhs OU     POHx:   1. RUL drooping from previous surgeries   Graves dx. She has had 3 lid surgeries left lids, and 2 on the right   Sx by Dr Perez 11/18 he removed some old sutures    2.S/P YAG OS 09/22/17     S/P YAG OD 03/31/2017    Last edited by Asia Mosquera MA on 10/8/2019 10:10 AM. (History)            Assessment /Plan     For exam results, see Encounter Report.    Ptosis of right eyelid  -     External/Slit Lamp Photography; Future  -     Goldmann Perimetry - OU - Intermediate - Both Eyes    Graves' orbitopathy      The patient is a pleasant 59-year-old female here for evaluation of a right relative ptosis compared to the left side.  She has a history of Graves orbitopathy and has undergone multiple procedures in both upper eyelids. She underwent bilateral lateral permanent tarsorrhaphy initially to prevent corneal compromise.  She has also undergone bilateral upper eyelid levator recession for bilateral upper eyelid retraction.    On exam, the patient has right relative ptosis compared to the left side.  She does feel the right side is heavier at the end of the day and impacts her activities of daily living.    Her visual fields show some improvement with the right upper eyelid taped versus untaped.    The patient has a positive phenylephrine test on the right side.  Her MRD 1 went from 3 mm to 4.5 mm on the right side.  Her left side went down to 4 mm from 4.5 mm.    Options include right mmcr and right modified fasanella-servat.  Due to her history of prior levator recession on the right, recommend right modified fasanella servat of 2 mm. .     Informed consent " obtained after extensive risks/benefits/alternatives were discussed with the patient including but not limited to pain, bleeding, infection, ocular injury, loss of the eye, asymmetry, need for revision in future, scarring.  Alternatives such as waiting were discussed.  All questions were answered.      Hold ASA, NSAIDS, and fish oil 5 to 7 days prior to procedure.    Return for surgery

## 2019-10-09 ENCOUNTER — TELEPHONE (OUTPATIENT)
Dept: OPHTHALMOLOGY | Facility: CLINIC | Age: 59
End: 2019-10-09

## 2019-10-09 DIAGNOSIS — H02.409 PTOSIS OF EYELID, UNSPECIFIED LATERALITY: Primary | ICD-10-CM

## 2019-10-09 DIAGNOSIS — E05.00 GRAVES' ORBITOPATHY: ICD-10-CM

## 2019-10-10 ENCOUNTER — PATIENT MESSAGE (OUTPATIENT)
Dept: FAMILY MEDICINE | Facility: CLINIC | Age: 59
End: 2019-10-10

## 2019-10-10 NOTE — TELEPHONE ENCOUNTER
Please see message thread.     If you include clearance in 9/27/2019 encounter, I can fax those notes from here.     Please advise.

## 2019-11-08 ENCOUNTER — HOSPITAL ENCOUNTER (OUTPATIENT)
Dept: RADIOLOGY | Facility: CLINIC | Age: 59
Discharge: HOME | End: 2019-11-08
Attending: NURSE PRACTITIONER
Payer: COMMERCIAL

## 2019-11-08 DIAGNOSIS — Z12.39 SCREENING FOR BREAST CANCER: ICD-10-CM

## 2019-11-08 PROCEDURE — 77067 SCR MAMMO BI INCL CAD: CPT | Mod: 26,,, | Performed by: RADIOLOGY

## 2019-11-08 PROCEDURE — 77063 BREAST TOMOSYNTHESIS BI: CPT | Mod: 26,,, | Performed by: RADIOLOGY

## 2019-11-08 PROCEDURE — 77063 MAMMO DIGITAL SCREENING BILAT WITH TOMOSYNTHESIS_CAD: ICD-10-PCS | Mod: 26,,, | Performed by: RADIOLOGY

## 2019-11-08 PROCEDURE — 77067 MAMMO DIGITAL SCREENING BILAT WITH TOMOSYNTHESIS_CAD: ICD-10-PCS | Mod: 26,,, | Performed by: RADIOLOGY

## 2019-11-08 PROCEDURE — 77067 SCR MAMMO BI INCL CAD: CPT | Mod: TC,PO

## 2019-11-12 RX ORDER — LIOTHYRONINE SODIUM 5 UG/1
TABLET ORAL
Qty: 90 TABLET | Refills: 1 | Status: SHIPPED | OUTPATIENT
Start: 2019-11-12 | End: 2020-05-12

## 2019-12-04 RX ORDER — LEVOTHYROXINE SODIUM 125 UG/1
TABLET ORAL
Qty: 157 TABLET | Refills: 0 | Status: SHIPPED | OUTPATIENT
Start: 2019-12-04 | End: 2020-05-14

## 2019-12-26 NOTE — PRE-PROCEDURE INSTRUCTIONS
PREOP INSTRUCTIONS:No solid food ,milk or milk products for 8 hours prior to procedure.Clear liquids are allowed up to 2 hours before procedure.Clear liquids are:water,apple juice,pedialyte,gatorade,& jello.Shower instructions as well as directions to the [facility] were given.Patient reported that she is unable to use dial or hibiclens on her skin and can only use products like Aveeno.Patient encouraged to wear loose fitting,comfortable clothing.Medication instructions for pm prior to and am of procedure reviewed.Instructed patient to avoid taking vitamins,supplements,aspirin and ibuprofen the morning of surgery.Patient stated an understanding.Patient stated that her  would accompany her to the hospital tomorrow.    Patient denies any side effects or issues with anesthesia or sedation.    Esperanza at 's office stated to give patient arrival time of 1030 which was done.

## 2019-12-27 ENCOUNTER — HOSPITAL ENCOUNTER (OUTPATIENT)
Facility: HOSPITAL | Age: 59
Discharge: HOME | End: 2019-12-27
Attending: OPHTHALMOLOGY | Admitting: OPHTHALMOLOGY
Payer: COMMERCIAL

## 2019-12-27 ENCOUNTER — ANESTHESIA EVENT (OUTPATIENT)
Dept: SURGERY | Facility: HOSPITAL | Age: 59
End: 2019-12-27
Payer: COMMERCIAL

## 2019-12-27 ENCOUNTER — ANESTHESIA (OUTPATIENT)
Dept: SURGERY | Facility: HOSPITAL | Age: 59
End: 2019-12-27
Payer: COMMERCIAL

## 2019-12-27 VITALS
SYSTOLIC BLOOD PRESSURE: 120 MMHG | RESPIRATION RATE: 15 BRPM | HEIGHT: 64 IN | WEIGHT: 158 LBS | BODY MASS INDEX: 26.98 KG/M2 | DIASTOLIC BLOOD PRESSURE: 57 MMHG | OXYGEN SATURATION: 96 % | HEART RATE: 76 BPM | TEMPERATURE: 99 F

## 2019-12-27 DIAGNOSIS — H02.409 PTOSIS OF EYELID, UNSPECIFIED LATERALITY: Primary | ICD-10-CM

## 2019-12-27 DIAGNOSIS — H02.409 PTOSIS: ICD-10-CM

## 2019-12-27 PROCEDURE — 63600175 PHARM REV CODE 636 W HCPCS: Performed by: OPHTHALMOLOGY

## 2019-12-27 PROCEDURE — D9220A PRA ANESTHESIA: ICD-10-PCS | Mod: ANES,,, | Performed by: ANESTHESIOLOGY

## 2019-12-27 PROCEDURE — 36000707: Performed by: OPHTHALMOLOGY

## 2019-12-27 PROCEDURE — 37000009 HC ANESTHESIA EA ADD 15 MINS: Performed by: OPHTHALMOLOGY

## 2019-12-27 PROCEDURE — 67908 REPAIR EYELID DEFECT: CPT | Mod: RT,,, | Performed by: OPHTHALMOLOGY

## 2019-12-27 PROCEDURE — 71000044 HC DOSC ROUTINE RECOVERY FIRST HOUR: Performed by: OPHTHALMOLOGY

## 2019-12-27 PROCEDURE — 25000003 PHARM REV CODE 250

## 2019-12-27 PROCEDURE — 25000003 PHARM REV CODE 250: Performed by: NURSE ANESTHETIST, CERTIFIED REGISTERED

## 2019-12-27 PROCEDURE — 63600175 PHARM REV CODE 636 W HCPCS: Performed by: NURSE ANESTHETIST, CERTIFIED REGISTERED

## 2019-12-27 PROCEDURE — 37000008 HC ANESTHESIA 1ST 15 MINUTES: Performed by: OPHTHALMOLOGY

## 2019-12-27 PROCEDURE — D9220A PRA ANESTHESIA: Mod: CRNA,,, | Performed by: NURSE ANESTHETIST, CERTIFIED REGISTERED

## 2019-12-27 PROCEDURE — 71000015 HC POSTOP RECOV 1ST HR: Performed by: OPHTHALMOLOGY

## 2019-12-27 PROCEDURE — 25000003 PHARM REV CODE 250: Performed by: ANESTHESIOLOGY

## 2019-12-27 PROCEDURE — S0028 INJECTION, FAMOTIDINE, 20 MG: HCPCS | Performed by: NURSE ANESTHETIST, CERTIFIED REGISTERED

## 2019-12-27 PROCEDURE — 63600175 PHARM REV CODE 636 W HCPCS: Performed by: ANESTHESIOLOGY

## 2019-12-27 PROCEDURE — 71000016 HC POSTOP RECOV ADDL HR: Performed by: OPHTHALMOLOGY

## 2019-12-27 PROCEDURE — D9220A PRA ANESTHESIA: Mod: ANES,,, | Performed by: ANESTHESIOLOGY

## 2019-12-27 PROCEDURE — 67908: ICD-10-PCS | Mod: RT,,, | Performed by: OPHTHALMOLOGY

## 2019-12-27 PROCEDURE — S0020 INJECTION, BUPIVICAINE HYDRO: HCPCS | Performed by: OPHTHALMOLOGY

## 2019-12-27 PROCEDURE — 25000003 PHARM REV CODE 250: Performed by: OPHTHALMOLOGY

## 2019-12-27 PROCEDURE — 25000003 PHARM REV CODE 250: Performed by: STUDENT IN AN ORGANIZED HEALTH CARE EDUCATION/TRAINING PROGRAM

## 2019-12-27 PROCEDURE — D9220A PRA ANESTHESIA: ICD-10-PCS | Mod: CRNA,,, | Performed by: NURSE ANESTHETIST, CERTIFIED REGISTERED

## 2019-12-27 PROCEDURE — 36000706: Performed by: OPHTHALMOLOGY

## 2019-12-27 RX ORDER — TOBRAMYCIN AND DEXAMETHASONE 3; 1 MG/ML; MG/ML
SUSPENSION/ DROPS OPHTHALMIC
Status: DISCONTINUED | OUTPATIENT
Start: 2019-12-27 | End: 2019-12-27 | Stop reason: HOSPADM

## 2019-12-27 RX ORDER — FENTANYL CITRATE 50 UG/ML
25 INJECTION, SOLUTION INTRAMUSCULAR; INTRAVENOUS EVERY 5 MIN PRN
Status: DISCONTINUED | OUTPATIENT
Start: 2019-12-27 | End: 2019-12-27 | Stop reason: HOSPADM

## 2019-12-27 RX ORDER — SODIUM CHLORIDE 9 MG/ML
INJECTION, SOLUTION INTRAVENOUS CONTINUOUS PRN
Status: DISCONTINUED | OUTPATIENT
Start: 2019-12-27 | End: 2019-12-27

## 2019-12-27 RX ORDER — TOBRAMYCIN 3 MG/ML
1 SOLUTION/ DROPS OPHTHALMIC 4 TIMES DAILY
Qty: 5 ML | Refills: 0
Start: 2019-12-27 | End: 2019-12-27 | Stop reason: SDUPTHER

## 2019-12-27 RX ORDER — PROMETHAZINE HYDROCHLORIDE 12.5 MG/1
12.5 TABLET ORAL EVERY 6 HOURS PRN
Qty: 20 TABLET | Refills: 0 | Status: SHIPPED | OUTPATIENT
Start: 2019-12-27 | End: 2020-01-03

## 2019-12-27 RX ORDER — PHENYLEPHRINE HYDROCHLORIDE 10 MG/ML
INJECTION INTRAVENOUS
Status: DISCONTINUED | OUTPATIENT
Start: 2019-12-27 | End: 2019-12-27

## 2019-12-27 RX ORDER — OXYCODONE HYDROCHLORIDE 5 MG/1
5 TABLET ORAL
Status: DISCONTINUED | OUTPATIENT
Start: 2019-12-27 | End: 2019-12-27 | Stop reason: HOSPADM

## 2019-12-27 RX ORDER — BUPIVACAINE HYDROCHLORIDE 5 MG/ML
INJECTION, SOLUTION EPIDURAL; INTRACAUDAL
Status: DISCONTINUED | OUTPATIENT
Start: 2019-12-27 | End: 2019-12-27 | Stop reason: HOSPADM

## 2019-12-27 RX ORDER — HYDROCODONE BITARTRATE AND ACETAMINOPHEN 5; 325 MG/1; MG/1
1 TABLET ORAL EVERY 6 HOURS PRN
Qty: 16 TABLET | Refills: 0 | Status: SHIPPED | OUTPATIENT
Start: 2019-12-27 | End: 2019-12-27

## 2019-12-27 RX ORDER — FENTANYL CITRATE 50 UG/ML
INJECTION, SOLUTION INTRAMUSCULAR; INTRAVENOUS
Status: DISCONTINUED | OUTPATIENT
Start: 2019-12-27 | End: 2019-12-27

## 2019-12-27 RX ORDER — HYDROCODONE BITARTRATE AND ACETAMINOPHEN 5; 325 MG/1; MG/1
1 TABLET ORAL EVERY 6 HOURS PRN
Qty: 16 TABLET | Refills: 0 | Status: SHIPPED | OUTPATIENT
Start: 2019-12-27 | End: 2021-08-12

## 2019-12-27 RX ORDER — TETRACAINE HYDROCHLORIDE 5 MG/ML
SOLUTION OPHTHALMIC
Status: DISCONTINUED | OUTPATIENT
Start: 2019-12-27 | End: 2019-12-27 | Stop reason: HOSPADM

## 2019-12-27 RX ORDER — PROPOFOL 10 MG/ML
VIAL (ML) INTRAVENOUS
Status: DISCONTINUED | OUTPATIENT
Start: 2019-12-27 | End: 2019-12-27

## 2019-12-27 RX ORDER — SODIUM CHLORIDE 0.9 % (FLUSH) 0.9 %
10 SYRINGE (ML) INJECTION
Status: DISCONTINUED | OUTPATIENT
Start: 2019-12-27 | End: 2019-12-27 | Stop reason: HOSPADM

## 2019-12-27 RX ORDER — BUPIVACAINE HYDROCHLORIDE 5 MG/ML
INJECTION, SOLUTION EPIDURAL; INTRACAUDAL
Status: DISCONTINUED
Start: 2019-12-27 | End: 2019-12-27 | Stop reason: HOSPADM

## 2019-12-27 RX ORDER — PROMETHAZINE HYDROCHLORIDE 12.5 MG/1
12.5 TABLET ORAL EVERY 6 HOURS PRN
Qty: 20 TABLET | Refills: 0 | Status: SHIPPED | OUTPATIENT
Start: 2019-12-27 | End: 2019-12-27 | Stop reason: SDUPTHER

## 2019-12-27 RX ORDER — ONDANSETRON 2 MG/ML
INJECTION INTRAMUSCULAR; INTRAVENOUS
Status: DISCONTINUED | OUTPATIENT
Start: 2019-12-27 | End: 2019-12-27

## 2019-12-27 RX ORDER — TOBRAMYCIN AND DEXAMETHASONE 3; 1 MG/ML; MG/ML
SUSPENSION/ DROPS OPHTHALMIC
Status: DISCONTINUED
Start: 2019-12-27 | End: 2019-12-27 | Stop reason: HOSPADM

## 2019-12-27 RX ORDER — FAMOTIDINE 10 MG/ML
INJECTION INTRAVENOUS
Status: DISCONTINUED | OUTPATIENT
Start: 2019-12-27 | End: 2019-12-27

## 2019-12-27 RX ORDER — TOBRAMYCIN 3 MG/ML
1 SOLUTION/ DROPS OPHTHALMIC 4 TIMES DAILY
Qty: 5 ML | Refills: 0
Start: 2019-12-27 | End: 2020-01-15

## 2019-12-27 RX ORDER — ONDANSETRON 2 MG/ML
4 INJECTION INTRAMUSCULAR; INTRAVENOUS DAILY PRN
Status: DISCONTINUED | OUTPATIENT
Start: 2019-12-27 | End: 2019-12-27 | Stop reason: HOSPADM

## 2019-12-27 RX ORDER — LIDOCAINE HCL/PF 100 MG/5ML
SYRINGE (ML) INTRAVENOUS
Status: DISCONTINUED | OUTPATIENT
Start: 2019-12-27 | End: 2019-12-27

## 2019-12-27 RX ORDER — TETRACAINE HYDROCHLORIDE 5 MG/ML
1 SOLUTION OPHTHALMIC ONCE
Status: COMPLETED | OUTPATIENT
Start: 2019-12-27 | End: 2019-12-27

## 2019-12-27 RX ORDER — LIDOCAINE HCL/EPINEPHRINE/PF 2%-1:200K
VIAL (ML) INJECTION
Status: DISCONTINUED | OUTPATIENT
Start: 2019-12-27 | End: 2019-12-27 | Stop reason: HOSPADM

## 2019-12-27 RX ORDER — MIDAZOLAM HYDROCHLORIDE 1 MG/ML
INJECTION, SOLUTION INTRAMUSCULAR; INTRAVENOUS
Status: DISCONTINUED | OUTPATIENT
Start: 2019-12-27 | End: 2019-12-27

## 2019-12-27 RX ORDER — TOBRAMYCIN 3 MG/ML
1 SOLUTION/ DROPS OPHTHALMIC 4 TIMES DAILY
Qty: 5 ML | Refills: 0
Start: 2019-12-27 | End: 2019-12-27

## 2019-12-27 RX ADMIN — PROPOFOL 200 MG: 10 INJECTION, EMULSION INTRAVENOUS at 03:12

## 2019-12-27 RX ADMIN — PHENYLEPHRINE HYDROCHLORIDE 100 MCG: 10 INJECTION INTRAVENOUS at 03:12

## 2019-12-27 RX ADMIN — FAMOTIDINE 20 MG: 10 INJECTION, SOLUTION INTRAVENOUS at 03:12

## 2019-12-27 RX ADMIN — OXYCODONE HYDROCHLORIDE 5 MG: 5 TABLET ORAL at 05:12

## 2019-12-27 RX ADMIN — MIDAZOLAM HYDROCHLORIDE 2 MG: 1 INJECTION, SOLUTION INTRAMUSCULAR; INTRAVENOUS at 03:12

## 2019-12-27 RX ADMIN — PROPOFOL 50 MG: 10 INJECTION, EMULSION INTRAVENOUS at 03:12

## 2019-12-27 RX ADMIN — LIDOCAINE HYDROCHLORIDE 100 MG: 20 INJECTION, SOLUTION INTRAVENOUS at 03:12

## 2019-12-27 RX ADMIN — FENTANYL CITRATE 100 MCG: 50 INJECTION, SOLUTION INTRAMUSCULAR; INTRAVENOUS at 03:12

## 2019-12-27 RX ADMIN — ONDANSETRON 4 MG: 2 INJECTION INTRAMUSCULAR; INTRAVENOUS at 04:12

## 2019-12-27 RX ADMIN — SODIUM CHLORIDE: 0.9 INJECTION, SOLUTION INTRAVENOUS at 03:12

## 2019-12-27 RX ADMIN — FENTANYL CITRATE 25 MCG: 50 INJECTION INTRAMUSCULAR; INTRAVENOUS at 05:12

## 2019-12-27 RX ADMIN — TETRACAINE HYDROCHLORIDE 1 DROP: 5 SOLUTION OPHTHALMIC at 02:12

## 2019-12-27 NOTE — DISCHARGE INSTRUCTIONS
Please see instructions listed on paper given from Dr. Jaimes.     DO NOT take/mix NORCO (opiod pain medication) with Xanax as this can lead to decreased respiratory rate, issues with breathing and excess sedation.

## 2019-12-27 NOTE — TRANSFER OF CARE
"Anesthesia Transfer of Care Note    Patient: Lexi Chaney    Procedure(s) Performed: Procedure(s) (LRB):  REATTACHMENT, MUSCLE (Right)    Patient location: [facility]    Anesthesia Type: general    Transport from OR: Transported from OR on 6-10 L/min O2 by face mask with adequate spontaneous ventilation    Post pain: adequate analgesia    Post assessment: no apparent anesthetic complications    Post vital signs: stable    Level of consciousness: awake    Nausea/Vomiting: no nausea/vomiting    Complications: none    Transfer of care protocol was followed      Last vitals:   Visit Vitals  BP (!) 139/59 (BP Location: Left arm, Patient Position: Lying)   Pulse 69   Temp 37.1 °C (98.7 °F) (Oral)   Resp 17   Ht 5' 4" (1.626 m)   Wt 71.7 kg (158 lb)   SpO2 99%   Breastfeeding? No   BMI 27.12 kg/m²     "

## 2019-12-27 NOTE — ANESTHESIA POSTPROCEDURE EVALUATION
Anesthesia Post Evaluation    Patient: Lexi Chaney    Procedure(s) Performed: Procedure(s) (LRB):  REATTACHMENT, MUSCLE (Right)    Final Anesthesia Type: general    Patient location during evaluation: [facility]  Patient participation: Yes- Able to Participate  Level of consciousness: awake and alert and oriented  Post-procedure vital signs: reviewed and stable  Pain management: adequate  Airway patency: patent    PONV status at discharge: No PONV  Anesthetic complications: no      Cardiovascular status: blood pressure returned to baseline and hemodynamically stable  Respiratory status: unassisted, spontaneous ventilation and room air  Hydration status: euvolemic  Follow-up not needed.          Vitals Value Taken Time   /83 12/27/2019  5:02 PM   Temp 36.8 °C (98.2 °F) 12/27/2019  4:27 PM   Pulse 71 12/27/2019  5:10 PM   Resp 16 12/27/2019  4:30 PM   SpO2 97 % 12/27/2019  5:10 PM   Vitals shown include unvalidated device data.      No case tracking events are documented in the log.      Pain/Montse Score: Pain Rating Prior to Med Admin: 5 (12/27/2019  5:03 PM)  Montse Score: 9 (12/27/2019  4:27 PM)

## 2019-12-27 NOTE — H&P
Pre-Operative History & Physical  Ophthalmology      SUBJECTIVE:     History of Present Illness:  Patient is a 59 y.o. female presents with Ptosis of eyelid, unspecified laterality [H02.409]  Graves' orbitopathy [E05.00]  Ptosis [H02.409].    MEDICATIONS:   PTA Medications   Medication Sig    ALPRAZolam (XANAX) 0.5 MG tablet Take 1 tablet (0.5 mg total) by mouth nightly as needed.    cholecalciferol, vitamin D3, 5,000 unit Tab Take 5,000 Units by mouth every other day.    dicyclomine (BENTYL) 20 mg tablet Take 1 tablet (20 mg total) by mouth every 8 (eight) hours as needed.    diphenhydrAMINE (BENADRYL) 25 mg capsule Take 25 mg by mouth nightly as needed for Allergies or Insomnia.    liothyronine (CYTOMEL) 5 MCG Tab TAKE 1 TABLET BY MOUTH EVERY DAY    metoprolol succinate (TOPROL-XL) 25 MG 24 hr tablet 1/2 po qd    SYNTHROID 125 mcg tablet TAKE 1 TABLET BY MOUTH EVERY DAY FOR 6 DAYS A WEEK AND 1/2 TABLET ON DAY 7       ALLERGIES:   Review of patient's allergies indicates:   Allergen Reactions    Sulfa (sulfonamide antibiotics) Itching and Other (See Comments)     Hyperventilation       PAST MEDICAL HISTORY:   Past Medical History:   Diagnosis Date    Abnormal Pap smear     Anxiety     Arthritis     Cataract     Grave's disease     Grave's disease     IBS (irritable bowel syndrome)     rare    Internal hemorrhoids     followed by Dr. Schilling    Nephrolithiasis     followed by Dr. Pope    Thyroid disease     Vitamin D deficiency      PAST SURGICAL HISTORY:   Past Surgical History:   Procedure Laterality Date    CATARACT EXTRACTION  11/19/12    right eye (toric)    CATARACT EXTRACTION W/  INTRAOCULAR LENS IMPLANT  11/26/12    left eye (toric)    EXTRACORPOREAL SHOCK WAVE LITHOTRIPSY      EYE SURGERY      eyelid surgery    LITHOTRIPSY      tonsillectomy       PAST FAMILY HISTORY:   Family History   Problem Relation Age of Onset    Hypertension Father     Stroke Father     Heart disease Mother          valve    Hypertension Mother     Heart disease Brother     Hypertension Brother     Diabetes Brother     Amblyopia Neg Hx     Blindness Neg Hx     Cancer Neg Hx     Cataracts Neg Hx     Glaucoma Neg Hx     Macular degeneration Neg Hx     Retinal detachment Neg Hx     Strabismus Neg Hx     Thyroid disease Neg Hx     Anesthesia problems Neg Hx     Clotting disorder Neg Hx      SOCIAL HISTORY:   Social History     Tobacco Use    Smoking status: Never Smoker    Smokeless tobacco: Never Used   Substance Use Topics    Alcohol use: Yes     Comment: q month    Drug use: No        MENTAL STATUS: Alert    REVIEW OF SYSTEMS: Negative    OBJECTIVE:     Vital Signs (Most Recent)       Physical Exam:  General: NAD  HEENT: ptosis od   Lungs: Adequate respirations  Heart: + pulses  Abdomen: Soft    ASSESSMENT/PLAN:     Patient is a 59 y.o. female with Ptosis of eyelid, unspecified laterality [H02.409]  Graves' orbitopathy [E05.00]  Ptosis [H02.409].     - Proceed to OR for right modified cristiana.    Sade Butcher, PGY-3  Ophthalmology

## 2019-12-27 NOTE — OP NOTE
"Ophthalmology Service  Operative Note    Date: 12/27/2019     Attending: Karoline Jaimes MD     Resident: Sade Butcher MD     Pre-Op Diagnosis: Right upper eyelid ptosis     Post-Op Diagnosis: Same     Procedure:   1) Modified cristiana Right ([id number])    Anesthesia: General/LMA      EBL: <  5 cc     Complications: None     Specimens: None     Discharge: Home       Indications for surgery: 59 y.o.-year-old female with a history of grave"s disease and multiple eyelid surgeries with relative ptosis on the right upper eyelid interfering with activities of daily living. Patient understands the risk of pain, bleeding, infection, ocular injury, loss of the eye, asymmetry, need for revision in future, scarring.       Procedure in detail: Prior to induction of anesthesia, with patient sitting in an upright position, the right eyelid was marked at the medial pupil border.  After general anesthesia was induced, 2 cc of 2% lidocaine with epinephrine, 0.5% bupivacaine, and Vitrase was injected subcutaneously into the right fornix and upper eyelids. Half of the face was then prepped using topical Betadine, and the patient was draped in sterile fashion.      Attention was turned to the right eye. The eyelid was everted using a Desmarres retractor. The superior edge of the tarsus was identified.  A sterile marking pen and a caliper were used to janina a point 2.0 mm inferior from the superior edge of  tarsus centrally (corresponding to the pre-marked pupillary location).  A traction suture was placed at the tarsus/conjunctiva border laterally and medially using 4-0 silk on a G-3 needle. Each traction suture was distracted from the site with equal tension. The ptosis clamp was used to grasp the tarsus, Pizano's muscle & conjunctiva firmly. The inferior edge of the clamp was made to line up with the pre-marked line at 2mm. With the clamp in place, a 6-0 Prolene suture on a P-3 needle was inserted through the eyelid skin to the " opposing palpebral conjunctiva, exiting laterally beneath the ptosis clamp (about 1-2mm below). A running horizontal mattress suture was continued laterally. The last throw was more inferior (toward fornix conjunctiva) to the prior throws and directed from the conjunctiva and out through the eyelid skin (directly over the superior aspect of the medial clamped tissue). The suture tails were held in tension while the remaining Tarsus,  Baca's muscle and conjunctiva were resected from behind the ptosis clamp using a #15 scalpel, while taking care to avoid the cornea.  The eyelid was once again everted using a Desmarres retractor. The conjunctiva was inspected and no remaining silk suture was found. The Prolene suture was tied loosely over the eyelid.     The patient tolerated the procedure well without any complications. The patient was awoken and taken to the recovery room in stable condition.  The patient will use Tobradex eye drops to both eyes four times daily as well as Tobradex oiintment to the skin of the eyelids at night in both eyes. They will follow up in 1 week for their follow up appointment.

## 2019-12-27 NOTE — DISCHARGE SUMMARY
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 female presenting for ptosis of right eye(s). Patient tolerated the surgery well without any complications.     Consults: None    Significant Diagnostic Studies: None    Disposition: Home    Patient Instructions:   - Resume same diet as prior to surgery  - Limit activity, no heavy lifting  - Call MD for severe uncontrolled pain  - Follow-up with ophthalmology as instructed    Patient Instructions:   Current Discharge Medication List      START taking these medications    Details   HYDROcodone-acetaminophen (NORCO) 5-325 mg per tablet Take 1 tablet by mouth every 6 (six) hours as needed for Pain.  Qty: 16 tablet, Refills: 0      promethazine (PHENERGAN) 12.5 MG Tab Take 1 tablet (12.5 mg total) by mouth every 6 (six) hours as needed (nausea).  Qty: 20 tablet, Refills: 0      tobramycin sulfate 0.3% (TOBREX) 0.3 % ophthalmic solution Place 1 drop into the right eye 4 (four) times daily. Use until bottle finished for 19 days  Qty: 5 mL, Refills: 0      tobramycin-dexamethasone 0.3-0.1% (TOBRADEX) 0.3-0.1 % Oint Place into the right eye 3 (three) times daily. Place on suture lines 3x a day for 4 days  Qty: 3.5 g, Refills: 1         CONTINUE these medications which have NOT CHANGED    Details   ALPRAZolam (XANAX) 0.5 MG tablet Take 1 tablet (0.5 mg total) by mouth nightly as needed.  Qty: 30 tablet, Refills: 0    Associated Diagnoses: Anxiety; Primary insomnia      cholecalciferol, vitamin D3, 5,000 unit Tab Take 5,000 Units by mouth every other day.      dicyclomine  (BENTYL) 20 mg tablet Take 1 tablet (20 mg total) by mouth every 8 (eight) hours as needed.  Qty: 30 tablet, Refills: 3      diphenhydrAMINE (BENADRYL) 25 mg capsule Take 25 mg by mouth nightly as needed for Allergies or Insomnia.      liothyronine (CYTOMEL) 5 MCG Tab TAKE 1 TABLET BY MOUTH EVERY DAY  Qty: 90 tablet, Refills: 1      metoprolol succinate (TOPROL-XL) 25 MG 24 hr tablet 1/2 po qd  Qty: 45 tablet, Refills: 3    Associated Diagnoses: Palpitations      SYNTHROID 125 mcg tablet TAKE 1 TABLET BY MOUTH EVERY DAY FOR 6 DAYS A WEEK AND 1/2 TABLET ON DAY 7  Qty: 157 tablet, Refills: 0             Discharge Procedure Orders   Diet general     No dressing needed

## 2019-12-27 NOTE — ANESTHESIA PREPROCEDURE EVALUATION
12/27/2019  Lexi Chaney is a 59 y.o., female.    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]       Patient Active Problem List   Diagnosis    Insomnia    Anxiety    Migraines    DJD (degenerative joint disease) of knee    Nephrolithiasis    Hypothyroidism    Plantar fasciitis    Metatarsalgia    Equinus deformity of foot, acquired    Shoulder pain    Vitamin D deficiency    Irritable bowel syndrome without diarrhea    Right shoulder pain    Shoulder stiffness, right    Shoulder weakness    Left shoulder pain    Shoulder stiffness, left    Ptosis     Past Surgical History:   Procedure Laterality Date    CATARACT EXTRACTION  11/19/12    right eye (toric)    CATARACT EXTRACTION W/  INTRAOCULAR LENS IMPLANT  11/26/12    left eye (toric)    EXTRACORPOREAL SHOCK WAVE LITHOTRIPSY      EYE SURGERY      eyelid surgery    LITHOTRIPSY      tonsillectomy       Current Discharge Medication List      CONTINUE these medications which have NOT CHANGED    Details   ALPRAZolam (XANAX) 0.5 MG tablet Take 1 tablet (0.5 mg total) by mouth nightly as needed.  Qty: 30 tablet, Refills: 0    Associated Diagnoses: Anxiety; Primary insomnia      cholecalciferol, vitamin D3, 5,000 unit Tab Take 5,000 Units by mouth every other day.      dicyclomine (BENTYL) 20 mg tablet Take 1 tablet (20 mg total) by mouth every 8 (eight) hours as needed.  Qty: 30 tablet, Refills: 3      diphenhydrAMINE (BENADRYL) 25 mg capsule Take 25 mg by mouth nightly as needed for Allergies or Insomnia.      liothyronine (CYTOMEL) 5 MCG Tab TAKE 1 TABLET BY MOUTH EVERY DAY  Qty: 90 tablet, Refills: 1      metoprolol succinate (TOPROL-XL) 25 MG  24 hr tablet 1/2 po qd  Qty: 45 tablet, Refills: 3    Associated Diagnoses: Palpitations      SYNTHROID 125 mcg tablet TAKE 1 TABLET BY MOUTH EVERY DAY FOR 6 DAYS A WEEK AND 1/2 TABLET ON DAY 7  Qty: 157 tablet, Refills: 0               Anesthesia Evaluation    I have reviewed the Patient Summary Reports.    I have reviewed the Nursing Notes.   I have reviewed the Medications.     Review of Systems  Anesthesia Hx:  Denies Family Hx of Anesthesia complications.   Denies Personal Hx of Anesthesia complications.   Social:  Non-Smoker, Social Alcohol Use    Cardiovascular:  Cardiovascular Normal     Renal/:   renal calculi    Musculoskeletal:   Arthritis     Endocrine:   Hypothyroidism    Psych:   anxiety          Physical Exam  General:  Well nourished    Airway/Jaw/Neck:  Airway Findings: Mouth Opening: Normal Tongue: Normal  Mallampati: II  TM Distance: Normal, at least 6 cm      Dental:  Dental Findings: In tact        Mental Status:  Mental Status Findings:  Cooperative         Anesthesia Plan  Type of Anesthesia, risks & benefits discussed:  Anesthesia Type:  general, MAC  Patient's Preference:   Intra-op Monitoring Plan: standard ASA monitors  Intra-op Monitoring Plan Comments:   Post Op Pain Control Plan: multimodal analgesia and per primary service following discharge from PACU  Post Op Pain Control Plan Comments:   Induction:   IV  Beta Blocker:  Patient is on a Beta-Blocker and has received one dose within the past 24 hours (No further documentation required).       Informed Consent: Patient understands risks and agrees with Anesthesia plan.  Questions answered. Anesthesia consent signed with patient.  ASA Score: 2     Day of Surgery Review of History & Physical:            Ready For Surgery From Anesthesia Perspective.

## 2019-12-27 NOTE — BRIEF OP NOTE
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

## 2020-01-02 ENCOUNTER — OFFICE VISIT (OUTPATIENT)
Dept: OPHTHALMOLOGY | Facility: CLINIC | Age: 60
End: 2020-01-02
Payer: COMMERCIAL

## 2020-01-02 DIAGNOSIS — Z98.890 POST-OPERATIVE STATE: Primary | ICD-10-CM

## 2020-01-02 DIAGNOSIS — E05.00 GRAVES' ORBITOPATHY: ICD-10-CM

## 2020-01-02 DIAGNOSIS — H02.401 PTOSIS OF RIGHT EYELID: ICD-10-CM

## 2020-01-02 PROCEDURE — 99999 PR PBB SHADOW E&M-EST. PATIENT-LVL II: ICD-10-PCS | Mod: PBBFAC,,, | Performed by: OPHTHALMOLOGY

## 2020-01-02 PROCEDURE — 99024 PR POST-OP FOLLOW-UP VISIT: ICD-10-PCS | Mod: S$GLB,,, | Performed by: OPHTHALMOLOGY

## 2020-01-02 PROCEDURE — 99024 POSTOP FOLLOW-UP VISIT: CPT | Mod: S$GLB,,, | Performed by: OPHTHALMOLOGY

## 2020-01-02 PROCEDURE — 99999 PR PBB SHADOW E&M-EST. PATIENT-LVL II: CPT | Mod: PBBFAC,,, | Performed by: OPHTHALMOLOGY

## 2020-01-02 RX ORDER — LACTULOSE 10 G/15ML
SOLUTION ORAL; RECTAL
Refills: 0 | Status: ON HOLD | COMMUNITY
Start: 2019-10-03 | End: 2020-03-24

## 2020-01-02 NOTE — PROGRESS NOTES
"HPI     Post-op Evaluation      Additional comments: 1 wk              Comments     Patient here for 1 wk PO. Patient would like to discuss external   sutures--patient states that she assumed sutures would be dissolvable.   Patient was informed by Dr. Perez that the brain communicates with the   eyes--"if one eye goes down the other eye wants to go down". Patient   concerned of post-operative droopy eyelids and if they would droop more.    tobrex 0.3% gtts q daily.   tobradex ointment 1-2 daily.   norco 5-325mg q6h prn po D/C    S/p reattachment of muscle, right - 12/27/2019  S/p cat extraction w/ IOL (toric), left - 11/26/2012  S/p cat extraction, w/ IOL (toric), left - 11/19/2012    1. Vitreous floaters  2. PCO OU  3. Migraines  4. Ptosis  5. Hypothyroidism  6. Grave's disease  7. Cataracts          Last edited by Sade Butcher MD on 1/2/2020 10:07 AM. (History)            Assessment /Plan     For exam results, see Encounter Report.    Post-operative state  -     External Photography - OU - Both Eyes  -     SUTURE REMOVAL      Patient doing well! Post-operative instructions reviewed. Sutures removed. All questions answered. Stop ointment. Continue tobradex ggts BID until finished.  Return in 6 weeks prn sooner any worsening of vision/symptoms or any concerns.    I have reviewed and concur with the resident's history, physical, assessment, and plan.  I have personally interviewed and examined the patient.                   "

## 2020-01-17 ENCOUNTER — OFFICE VISIT (OUTPATIENT)
Dept: FAMILY MEDICINE | Facility: CLINIC | Age: 60
End: 2020-01-17
Payer: COMMERCIAL

## 2020-01-17 VITALS
HEART RATE: 72 BPM | WEIGHT: 162.06 LBS | SYSTOLIC BLOOD PRESSURE: 120 MMHG | OXYGEN SATURATION: 98 % | TEMPERATURE: 98 F | BODY MASS INDEX: 27.67 KG/M2 | DIASTOLIC BLOOD PRESSURE: 84 MMHG | HEIGHT: 64 IN

## 2020-01-17 DIAGNOSIS — G43.809 OTHER MIGRAINE WITHOUT STATUS MIGRAINOSUS, NOT INTRACTABLE: Primary | ICD-10-CM

## 2020-01-17 PROCEDURE — 3008F BODY MASS INDEX DOCD: CPT | Mod: CPTII,S$GLB,, | Performed by: FAMILY MEDICINE

## 2020-01-17 PROCEDURE — 99999 PR PBB SHADOW E&M-EST. PATIENT-LVL IV: CPT | Mod: PBBFAC,,, | Performed by: FAMILY MEDICINE

## 2020-01-17 PROCEDURE — 99213 PR OFFICE/OUTPT VISIT, EST, LEVL III, 20-29 MIN: ICD-10-PCS | Mod: S$GLB,,, | Performed by: FAMILY MEDICINE

## 2020-01-17 PROCEDURE — 3008F PR BODY MASS INDEX (BMI) DOCUMENTED: ICD-10-PCS | Mod: CPTII,S$GLB,, | Performed by: FAMILY MEDICINE

## 2020-01-17 PROCEDURE — 99999 PR PBB SHADOW E&M-EST. PATIENT-LVL IV: ICD-10-PCS | Mod: PBBFAC,,, | Performed by: FAMILY MEDICINE

## 2020-01-17 PROCEDURE — 99213 OFFICE O/P EST LOW 20 MIN: CPT | Mod: S$GLB,,, | Performed by: FAMILY MEDICINE

## 2020-01-17 RX ORDER — BUTALBITAL, ACETAMINOPHEN AND CAFFEINE 50; 325; 40 MG/1; MG/1; MG/1
1 TABLET ORAL EVERY 4 HOURS PRN
Qty: 30 TABLET | Refills: 1 | Status: SHIPPED | OUTPATIENT
Start: 2020-01-17 | End: 2020-02-16

## 2020-01-17 NOTE — PROGRESS NOTES
Subjective:       Patient ID: Lexi Chaney is a 59 y.o. female.    Chief Complaint: Migraines (symptoms started getting worse in December)    Here with persistent headaches for the last 2 weeks.  She thinks this was precipitated by the anesthesia during the eye surgery  C/o daily frontal HA which develops in the evening on most days.  It gradually builds and be an 8/10, some blurred vision, nausea, light-headed  HA is familiar and has been chronic.  Using Advil, which helps, but this has caused some upset stomach and diarrhea.  She could not tolerate fioricet as a young adult.  She does get an aura.    She has been more emotional since the death of her cat.  using xanax 1/2 tab lately to help with sleep.          Past Medical History:   Diagnosis Date    Abnormal Pap smear     Anxiety     Arthritis     Cataract     Grave's disease     Grave's disease     IBS (irritable bowel syndrome)     rare    Internal hemorrhoids     followed by Dr. Schilling    Nephrolithiasis     followed by Dr. Pope    Thyroid disease     Vitamin D deficiency        Past Surgical History:   Procedure Laterality Date    CATARACT EXTRACTION  11/19/12    right eye (toric)    CATARACT EXTRACTION W/  INTRAOCULAR LENS IMPLANT  11/26/12    left eye (toric)    EXTRACORPOREAL SHOCK WAVE LITHOTRIPSY      EYE SURGERY      eyelid surgery    LITHOTRIPSY      REATTACHMENT OF MUSCLE Right 12/27/2019    Procedure: REATTACHMENT, MUSCLE;  Surgeon: Karoline Jaimes MD;  Location: [facility] OR [address] [place] [address];  Service: Ophthalmology;  Laterality: Right;    tonsillectomy         Review of patient's allergies indicates:   Allergen Reactions    Sulfa (sulfonamide antibiotics) Itching and Other (See Comments)     Hyperventilation       Social History     Socioeconomic History    Marital status:      Spouse name: Not on file    Number of children: 2    Years of education: Not on file    Highest education level: Not on file   Occupational History     Occupation:      Employer: [employer]   Social Needs    Financial resource strain: Not hard at all    Food insecurity:     Worry: Never true     Inability: Never true    Transportation needs:     Medical: No     Non-medical: No   Tobacco Use    Smoking status: Never Smoker    Smokeless tobacco: Never Used   Substance and Sexual Activity    Alcohol use: Yes     Frequency: Monthly or less     Drinks per session: 1 or 2     Binge frequency: Never     Comment: q month    Drug use: No    Sexual activity: Yes     Partners: Male     Birth control/protection: Post-menopausal   Lifestyle    Physical activity:     Days per week: 4 days     Minutes per session: 30 min    Stress: Very much   Relationships    Social connections:     Talks on phone: More than three times a week     Gets together: Three times a week     Attends Restoration service: Not on file     Active member of club or organization: Yes     Attends meetings of clubs or organizations: More than 4 times per year     Relationship status:    Other Topics Concern    Not on file   Social History Narrative    Not on file       Current Outpatient Medications on File Prior to Visit   Medication Sig Dispense Refill    ALPRAZolam (XANAX) 0.5 MG tablet Take 1 tablet (0.5 mg total) by mouth nightly as needed. 30 tablet 0    cholecalciferol, vitamin D3, 5,000 unit Tab Take 5,000 Units by mouth every other day.      dicyclomine (BENTYL) 20 mg tablet Take 1 tablet (20 mg total) by mouth every 8 (eight) hours as needed. 30 tablet 3    diphenhydrAMINE (BENADRYL) 25 mg capsule Take 25 mg by mouth nightly as needed for Allergies or Insomnia.      HYDROcodone-acetaminophen (NORCO) 5-325 mg per tablet Take 1 tablet by mouth every 6 (six) hours as needed for Pain. 16 tablet 0    lactulose (CHRONULAC) 10 gram/15 mL solution TAKE 30 MLS BY MOUTH 2 TIMES DAILY AS NEEDED FOR CONSTIPATION  0    liothyronine (CYTOMEL) 5 MCG Tab TAKE 1 TABLET  BY MOUTH EVERY DAY 90 tablet 1    metoprolol succinate (TOPROL-XL) 25 MG 24 hr tablet 1/2 po qd 45 tablet 3    SYNTHROID 125 mcg tablet TAKE 1 TABLET BY MOUTH EVERY DAY FOR 6 DAYS A WEEK AND 1/2 TABLET ON DAY 7 157 tablet 0     No current facility-administered medications on file prior to visit.        Family History   Problem Relation Age of Onset    Hypertension Father     Stroke Father     Heart disease Mother         valve    Hypertension Mother     Heart disease Brother     Hypertension Brother     Diabetes Brother     Amblyopia Neg Hx     Blindness Neg Hx     Cancer Neg Hx     Cataracts Neg Hx     Glaucoma Neg Hx     Macular degeneration Neg Hx     Retinal detachment Neg Hx     Strabismus Neg Hx     Thyroid disease Neg Hx     Anesthesia problems Neg Hx     Clotting disorder Neg Hx        Review of Systems   Constitutional: Negative for activity change, appetite change, chills, fever and unexpected weight change.   HENT: Negative for hearing loss, rhinorrhea, sore throat and trouble swallowing.    Eyes: Negative for pain, discharge and visual disturbance.   Respiratory: Negative for cough, chest tightness, shortness of breath and wheezing.    Cardiovascular: Negative for chest pain and palpitations.   Gastrointestinal: Negative for abdominal pain, blood in stool, constipation, diarrhea, nausea and vomiting.   Endocrine: Negative for polydipsia and polyuria.   Genitourinary: Negative for difficulty urinating, dysuria, hematuria and menstrual problem.   Musculoskeletal: Negative for arthralgias, gait problem, joint swelling and neck pain.   Skin: Negative for rash and wound.   Neurological: Positive for headaches. Negative for dizziness, weakness and numbness.   Hematological: Negative for adenopathy.   Psychiatric/Behavioral: Negative for confusion and dysphoric mood.       Objective:      /84 (BP Location: Left arm, Patient Position: Sitting)   Pulse 72   Temp 98.2 °F (36.8 °C)  "(Oral)   Ht 5' 4" (1.626 m)   Wt 73.5 kg (162 lb 0.6 oz)   SpO2 98%   BMI 27.81 kg/m²   Physical Exam   Constitutional: She is oriented to person, place, and time. She appears well-developed and well-nourished.   HENT:   Head: Normocephalic.   Mouth/Throat: Oropharynx is clear and moist. No oropharyngeal exudate or posterior oropharyngeal erythema.   Eyes: Pupils are equal, round, and reactive to light. Conjunctivae and EOM are normal.   Fundoscopic exam:       The right eye shows no exudate, no hemorrhage and no papilledema.        The left eye shows no exudate, no hemorrhage and no papilledema.   Neck: Normal range of motion. Neck supple. No thyromegaly present.   Cardiovascular: Normal rate, regular rhythm, S1 normal, S2 normal, normal heart sounds and intact distal pulses. Exam reveals no gallop and no friction rub.   No murmur heard.  Pulmonary/Chest: Effort normal and breath sounds normal. She has no wheezes. She has no rales.   Abdominal: Normal appearance.   Musculoskeletal:        Right lower leg: She exhibits no edema.        Left lower leg: She exhibits no edema.   Lymphadenopathy:     She has no cervical adenopathy.   Neurological: She is alert and oriented to person, place, and time. She has normal strength. No cranial nerve deficit or sensory deficit. Gait normal.   Skin: Skin is warm and intact. No rash noted.   Psychiatric: She has a normal mood and affect.       Assessment:       1. Other migraine without status migrainosus, not intractable        Plan:       Other migraine without status migrainosus, not intractable  -     butalbital-acetaminophen-caffeine -40 mg (FIORICET, ESGIC) -40 mg per tablet; Take 1 tablet by mouth every 4 (four) hours as needed for Pain.  Dispense: 30 tablet; Refill: 1      fioricet at earliest sign of headache  Counseled on regular exercise, maintenance of a healthy weight, balanced diet rich in fruits/vegetables and lean protein, and avoidance of unhealthy " habits like smoking and excessive alcohol intake.

## 2020-01-27 ENCOUNTER — OFFICE VISIT (OUTPATIENT)
Dept: DERMATOLOGY | Facility: CLINIC | Age: 60
End: 2020-01-27
Payer: COMMERCIAL

## 2020-01-27 VITALS — BODY MASS INDEX: 27.66 KG/M2 | WEIGHT: 162 LBS | HEIGHT: 64 IN

## 2020-01-27 DIAGNOSIS — D23.9 INTRADERMAL NEVUS: ICD-10-CM

## 2020-01-27 DIAGNOSIS — D23.9 DILATED PORE OF WINER: ICD-10-CM

## 2020-01-27 DIAGNOSIS — D17.9 LIPOMA, UNSPECIFIED SITE: ICD-10-CM

## 2020-01-27 DIAGNOSIS — L82.1 SK (SEBORRHEIC KERATOSIS): Primary | ICD-10-CM

## 2020-01-27 PROCEDURE — 99203 PR OFFICE/OUTPT VISIT, NEW, LEVL III, 30-44 MIN: ICD-10-PCS | Mod: S$GLB,,, | Performed by: DERMATOLOGY

## 2020-01-27 PROCEDURE — 3008F PR BODY MASS INDEX (BMI) DOCUMENTED: ICD-10-PCS | Mod: CPTII,S$GLB,, | Performed by: DERMATOLOGY

## 2020-01-27 PROCEDURE — 3008F BODY MASS INDEX DOCD: CPT | Mod: CPTII,S$GLB,, | Performed by: DERMATOLOGY

## 2020-01-27 PROCEDURE — 99203 OFFICE O/P NEW LOW 30 MIN: CPT | Mod: S$GLB,,, | Performed by: DERMATOLOGY

## 2020-01-27 PROCEDURE — 99999 PR PBB SHADOW E&M-EST. PATIENT-LVL III: CPT | Mod: PBBFAC,,, | Performed by: DERMATOLOGY

## 2020-01-27 PROCEDURE — 99999 PR PBB SHADOW E&M-EST. PATIENT-LVL III: ICD-10-PCS | Mod: PBBFAC,,, | Performed by: DERMATOLOGY

## 2020-01-27 NOTE — PROGRESS NOTES
Subjective:       Patient ID:  Lexi Chaney is a 59 y.o. female who presents for   Chief Complaint   Patient presents with    Spot     face     HPI    New patient  No phx of skin cancer  Father hx of Melanoma    Patient presents today with spots on the face ( nose area), x 6 months, raised, no tx    Review of Systems   Constitutional: Negative for fever, chills and fatigue.   HENT: Negative for congestion and sore throat.    Respiratory: Negative for cough and shortness of breath.    Skin: Positive for daily sunscreen use, activity-related sunscreen use and wears hat.   Hematologic/Lymphatic: Does not bruise/bleed easily.        Objective:    Physical Exam   Constitutional: She appears well-developed and well-nourished. No distress.   HENT:   Mouth/Throat: Lips normal.    Eyes: No conjunctival no injection.   Neurological: She is alert and oriented to person, place, and time.   Psychiatric: She has a normal mood and affect.   Skin:   Areas Examined (abnormalities noted in diagram):   Scalp / Hair Palpated and Inspected  Head / Face Inspection Performed  Neck Inspection Performed  Back Inspection Performed                   Diagram Legend     Erythematous scaling macule/papule c/w actinic keratosis       Vascular papule c/w angioma      Pigmented verrucoid papule/plaque c/w seborrheic keratosis      Yellow umbilicated papule c/w sebaceous hyperplasia      Irregularly shaped tan macule c/w lentigo     1-2 mm smooth white papules consistent with Milia      Movable subcutaneous cyst with punctum c/w epidermal inclusion cyst      Subcutaneous movable cyst c/w pilar cyst      Firm pink to brown papule c/w dermatofibroma      Pedunculated fleshy papule(s) c/w skin tag(s)      Evenly pigmented macule c/w junctional nevus     Mildly variegated pigmented, slightly irregular-bordered macule c/w mildly atypical nevus      Flesh colored to evenly pigmented papule c/w intradermal nevus       Pink pearly papule/plaque c/w  basal cell carcinoma      Erythematous hyperkeratotic cursted plaque c/w SCC      Surgical scar with no sign of skin cancer recurrence      Open and closed comedones      Inflammatory papules and pustules      Verrucoid papule consistent consistent with wart     Erythematous eczematous patches and plaques     Dystrophic onycholytic nail with subungual debris c/w onychomycosis     Umbilicated papule    Erythematous-base heme-crusted tan verrucoid plaque consistent with inflamed seborrheic keratosis     Erythematous Silvery Scaling Plaque c/w Psoriasis     See annotation      Assessment / Plan:        SK (seborrheic keratosis)  Discussed with patient the benign nature of these lesions and that no treatment is indicated.    Discussed with patient the etiology and pathogenesis of the disease or skin lesion(s) and possible treatments and aggravators.      Intradermal nevus  Discussed all of the following with the patient.    Patient to check their breasts (if applicable), buttocks, and groin with a mirror.  Patient deferred our examination of these areas today.    Each month, check your body for any spots such as freckles, age spots, and moles.  Watch for color changes and shape changes and growth in size.    Moles, also called nevi, are small, colored (pigmented) marks on the skin. They have no known purpose. Many moles appear before age 30, but they also increase frequently as people age. Moles most often are not cancer (benign) and are harmless. But some become cancerous (malignant). Thats why you need to watch the moles on your body and tell your healthcare provider about any that concern you.  Discussed with patient the benign nature of these lesions and that no treatment is indicated.    Prn cosmetic hyfrecation of idn on the l chin.  Costs $90.  Scar and recurrence reviewed.      Dilated pore of Aylin  Discussed with patient the benign nature of these lesions and that no treatment is indicated.    Discussed with  patient the etiology and pathogenesis of the disease or skin lesion(s) and possible treatments and aggravators.      Lipoma, unspecified site  Discussed with patient the etiology and pathogenesis of the disease or skin lesion(s) and possible treatments and aggravators.    Discussed with patient the risks of procedure or surgery, including scar, ulceration, recurrence, skin discoloration, and infection.           Follow up if symptoms worsen or fail to improve.

## 2020-02-17 ENCOUNTER — PATIENT MESSAGE (OUTPATIENT)
Dept: FAMILY MEDICINE | Facility: CLINIC | Age: 60
End: 2020-02-17

## 2020-02-17 DIAGNOSIS — R00.2 PALPITATIONS: ICD-10-CM

## 2020-02-18 RX ORDER — METOPROLOL SUCCINATE 25 MG/1
TABLET, EXTENDED RELEASE ORAL
Qty: 45 TABLET | Refills: 3 | Status: SHIPPED | OUTPATIENT
Start: 2020-02-18 | End: 2020-11-23

## 2020-02-18 NOTE — TELEPHONE ENCOUNTER
Please see the following assessment. This refill request still requires some action on your part. Metoprolol succinate 25 mg has not been previously prescribed by you. Defer to you. Thank you.

## 2020-02-18 NOTE — TELEPHONE ENCOUNTER
Refill Authorization Note     is requesting a refill authorization.    Brief assessment and rationale for refill: REVIEW: not previously prescribed by you                                         Comments:   Requested Prescriptions   Pending Prescriptions Disp Refills    metoprolol succinate (TOPROL-XL) 25 MG 24 hr tablet 45 tablet 0     Sig: TAKE 1/2 TABLET BY MOUTH ONCE DAILY       Cardiovascular:  Beta Blockers Passed - 2/18/2020  7:26 AM        Passed - Patient is at least 18 years old        Passed - Last BP in normal range within 360 days.     BP Readings from Last 3 Encounters:   01/17/20 120/84   12/27/19 (!) 120/57   09/27/19 118/86              Passed - Last Heart Rate in normal range within 360 days.     Pulse Readings from Last 3 Encounters:   01/17/20 72   12/27/19 76   09/27/19 78             Passed - Office visit in past 12 months or future 90 days.     Recent Outpatient Visits            3 weeks ago SK (seborrheic keratosis)    [place] - D[facility]atology Marshall Nieves MD    1 month ago Other migraine without status migrainosus, not intractable    [facility] Danny Szymanski MD    1 month ago Post-operative [facility]    [facility] Karoline Jaimes MD    4 months ago Ptosis of right eyelid    [facility] Karoline Jaimes MD    4 months ago Preventative health care    [facility] Danny Szymanski MD          Future Appointments              In 2 days MD [facility]    In 4 weeks Rajiv Coker DO [place] [facility], [place]    In 1 month MD [facility]

## 2020-03-06 ENCOUNTER — OFFICE VISIT (OUTPATIENT)
Dept: SPINE | Facility: CLINIC | Age: 60
End: 2020-03-06
Payer: COMMERCIAL

## 2020-03-06 ENCOUNTER — PATIENT MESSAGE (OUTPATIENT)
Dept: SPINE | Facility: CLINIC | Age: 60
End: 2020-03-06

## 2020-03-06 VITALS
WEIGHT: 164.25 LBS | SYSTOLIC BLOOD PRESSURE: 142 MMHG | HEART RATE: 81 BPM | BODY MASS INDEX: 28.04 KG/M2 | HEIGHT: 64 IN | DIASTOLIC BLOOD PRESSURE: 68 MMHG

## 2020-03-06 DIAGNOSIS — M54.50 CHRONIC MIDLINE LOW BACK PAIN WITHOUT SCIATICA: Primary | ICD-10-CM

## 2020-03-06 DIAGNOSIS — M53.3 COCCYDYNIA: ICD-10-CM

## 2020-03-06 DIAGNOSIS — G89.29 CHRONIC MIDLINE LOW BACK PAIN WITHOUT SCIATICA: Primary | ICD-10-CM

## 2020-03-06 PROCEDURE — 99204 PR OFFICE/OUTPT VISIT, NEW, LEVL IV, 45-59 MIN: ICD-10-PCS | Mod: S$GLB,,, | Performed by: PHYSICAL MEDICINE & REHABILITATION

## 2020-03-06 PROCEDURE — 99204 OFFICE O/P NEW MOD 45 MIN: CPT | Mod: S$GLB,,, | Performed by: PHYSICAL MEDICINE & REHABILITATION

## 2020-03-06 PROCEDURE — 3008F PR BODY MASS INDEX (BMI) DOCUMENTED: ICD-10-PCS | Mod: S$GLB,,, | Performed by: PHYSICAL MEDICINE & REHABILITATION

## 2020-03-06 PROCEDURE — 3008F BODY MASS INDEX DOCD: CPT | Mod: S$GLB,,, | Performed by: PHYSICAL MEDICINE & REHABILITATION
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
## 2020-03-11 ENCOUNTER — TELEPHONE (OUTPATIENT)
Dept: SPINE | Facility: CLINIC | Age: 60
End: 2020-03-11

## 2020-03-11 ENCOUNTER — HOSPITAL ENCOUNTER (OUTPATIENT)
Dept: RADIOLOGY | Facility: HOSPITAL | Age: 60
Discharge: HOME | End: 2020-03-11
Attending: PHYSICAL MEDICINE & REHABILITATION
Payer: COMMERCIAL

## 2020-03-11 DIAGNOSIS — M54.50 CHRONIC MIDLINE LOW BACK PAIN WITHOUT SCIATICA: ICD-10-CM

## 2020-03-11 DIAGNOSIS — G89.29 CHRONIC MIDLINE LOW BACK PAIN WITHOUT SCIATICA: ICD-10-CM

## 2020-03-11 PROCEDURE — 72148 MRI LUMBAR SPINE WITHOUT CONTRAST: ICD-10-PCS | Mod: 26,,, | Performed by: RADIOLOGY

## 2020-03-11 PROCEDURE — 72148 MRI LUMBAR SPINE W/O DYE: CPT | Mod: 26,,, | Performed by: RADIOLOGY

## 2020-03-11 PROCEDURE — 72148 MRI LUMBAR SPINE W/O DYE: CPT | Mod: TC

## 2020-03-11 NOTE — TELEPHONE ENCOUNTER
----- Message from Stacey M Lefort sent at 3/11/2020 10:15 AM CDT -----  [facility] left a vm - Shan they said they are returning your call and that they cannot seem to locate the patient Ms. Chaney.  The office is requesting a return call at [phone].  Thank you.

## 2020-03-13 ENCOUNTER — OFFICE VISIT (OUTPATIENT)
Dept: SPINE | Facility: CLINIC | Age: 60
End: 2020-03-13
Payer: COMMERCIAL

## 2020-03-13 ENCOUNTER — TELEPHONE (OUTPATIENT)
Dept: PAIN MEDICINE | Facility: CLINIC | Age: 60
End: 2020-03-13

## 2020-03-13 VITALS
HEART RATE: 75 BPM | WEIGHT: 164.25 LBS | HEIGHT: 64 IN | BODY MASS INDEX: 28.04 KG/M2 | DIASTOLIC BLOOD PRESSURE: 79 MMHG | SYSTOLIC BLOOD PRESSURE: 128 MMHG

## 2020-03-13 DIAGNOSIS — G89.29 CHRONIC MIDLINE LOW BACK PAIN WITHOUT SCIATICA: Primary | ICD-10-CM

## 2020-03-13 DIAGNOSIS — M54.50 CHRONIC MIDLINE LOW BACK PAIN WITHOUT SCIATICA: Primary | ICD-10-CM

## 2020-03-13 PROCEDURE — 3008F BODY MASS INDEX DOCD: CPT | Mod: S$GLB,,, | Performed by: PHYSICAL MEDICINE & REHABILITATION

## 2020-03-13 PROCEDURE — 3008F PR BODY MASS INDEX (BMI) DOCUMENTED: ICD-10-PCS | Mod: S$GLB,,, | Performed by: PHYSICAL MEDICINE & REHABILITATION

## 2020-03-13 PROCEDURE — 99213 OFFICE O/P EST LOW 20 MIN: CPT | Mod: S$GLB,,, | Performed by: PHYSICAL MEDICINE & REHABILITATION

## 2020-03-13 PROCEDURE — 99213 PR OFFICE/OUTPT VISIT, EST, LEVL III, 20-29 MIN: ICD-10-PCS | Mod: S$GLB,,, | Performed by: PHYSICAL MEDICINE & REHABILITATION

## 2020-03-13 NOTE — TELEPHONE ENCOUNTER
----- Message from Zev Zhou MD sent at 3/13/2020  2:28 PM CDT -----  Please schedule for medial branch blocks and subsequent radiofrequency ablation as indicated of the left L4-5 and L5-S1 facet joints

## 2020-03-13 NOTE — PROGRESS NOTES
SUBJECTIVE:    Patient ID: Lexi Chaney is a 59 y.o. female.    Chief Complaint: Follow-up (mri)    She is here to review her lumbar MRI which was done on 03/11/2020 to evaluate her complaint of low back pain.  The MRI is summarized below:    Impression      1.  At the L4-5 level there is trace 2 mm anterolisthesis of L4 on L5.  There is a disc bulge eccentric to the left.  There is moderate-to-marked facet joint arthropathy with a 5 x 6 mm left-sided synovial cyst which contributes to left lateral recess stenosis and does contact the left L5 root.  There is left periarticular edema.  There is no spinal canal or foraminal stenosis.    2.  At the L5-S1 level there is a broad disc bulge with central left paracentral dorsal annular fissure which does contact the left S1 root.    Clinically I think she is a bit better.  Pain is now primarily localized on the left at the lumbosacral junction.  Coccyx pain has also improved.  She found a new type of cushion to sit on and that seems to help.  Having said that current pain level is 8/10.  She has no new or progressive problems.  She has no symptoms referable to the left L5 or S1 nerve roots        Past Medical History:   Diagnosis Date    Abnormal Pap smear     Anxiety     Arthritis     Cataract     Grave's disease     Grave's disease     IBS (irritable bowel syndrome)     rare    Internal hemorrhoids     followed by Dr. Schilling    Nephrolithiasis     followed by Dr. Pope    Thyroid disease     Vitamin D deficiency      Social History     Socioeconomic History    Marital status:      Spouse name: Not on file    Number of children: 2    Years of education: Not on file    Highest education level: Not on file   Occupational History    Occupation:      Employer: [employer]   Social Needs    Financial resource strain: Not hard at all    Food insecurity:     Worry: Never true     Inability: Never true    Transportation needs:      Medical: No     Non-medical: No   Tobacco Use    Smoking status: Never Smoker    Smokeless tobacco: Never Used   Substance and Sexual Activity    Alcohol use: Yes     Frequency: Monthly or less     Drinks per session: 1 or 2     Binge frequency: Never     Comment: q month    Drug use: No    Sexual activity: Yes     Partners: Male     Birth control/protection: Post-menopausal   Lifestyle    Physical activity:     Days per week: 4 days     Minutes per session: 30 min    Stress: Very much   Relationships    Social connections:     Talks on phone: More than three times a week     Gets together: Three times a week     Attends Confucianist service: Not on file     Active member of club or organization: Yes     Attends meetings of clubs or organizations: More than 4 times per year     Relationship status:    Other Topics Concern    Not on file   Social History Narrative    Not on file     Past Surgical History:   Procedure Laterality Date    CATARACT EXTRACTION  11/19/12    right eye (toric)    CATARACT EXTRACTION W/  INTRAOCULAR LENS IMPLANT  11/26/12    left eye (toric)    EXTRACORPOREAL SHOCK WAVE LITHOTRIPSY      EYE SURGERY      eyelid surgery    LITHOTRIPSY      REATTACHMENT OF MUSCLE Right 12/27/2019    Procedure: REATTACHMENT, MUSCLE;  Surgeon: Karoline Jaimes MD;  Location: [address];  Service: Ophthalmology;  Laterality: Right;    tonsillectomy       Family History   Problem Relation Age of Onset    Hypertension Father     Stroke Father     Heart disease Mother         valve    Hypertension Mother     Heart disease Brother     Hypertension Brother     Diabetes Brother     Amblyopia Neg Hx     Blindness Neg Hx     Cancer Neg Hx     Cataracts Neg Hx     Glaucoma Neg Hx     Macular degeneration Neg Hx     Retinal detachment Neg Hx     Strabismus Neg Hx     Thyroid disease Neg Hx     Anesthesia problems Neg Hx     Clotting disorder Neg Hx      Vitals:    03/13/20 1411  "  BP: 128/79   Pulse: 75   Weight: 74.5 kg (164 lb 3.9 oz)   Height: 5' 4" (1.626 m)       Review of Systems   Constitutional: Negative for chills, diaphoresis, fatigue, fever and unexpected weight change.   HENT: Negative for trouble swallowing.    Eyes: Negative for visual disturbance.   Respiratory: Negative for shortness of breath.    Cardiovascular: Negative for chest pain.   Gastrointestinal: Negative for abdominal pain, constipation, nausea and vomiting.   Genitourinary: Negative for difficulty urinating.   Musculoskeletal: Negative for arthralgias, back pain, gait problem, joint swelling, myalgias, neck pain and neck stiffness.   Neurological: Negative for dizziness, speech difficulty, weakness, light-headedness, numbness and headaches.          Objective:      Physical Exam   Constitutional: She is oriented to person, place, and time. She appears well-developed and well-nourished.   Neurological: She is alert and oriented to person, place, and time.           Assessment:       1. Chronic midline low back pain without sciatica           Plan:     we will get her scheduled for medial branch blocks and subsequent radiofrequency ablation as indicated of the left L4-5 and L5-S1 facet joints.  Follow-up with me after the procedure      Chronic midline low back pain without sciatica        "

## 2020-03-17 ENCOUNTER — TELEPHONE (OUTPATIENT)
Dept: PAIN MEDICINE | Facility: CLINIC | Age: 60
End: 2020-03-17

## 2020-03-17 NOTE — TELEPHONE ENCOUNTER
----- Message from Sebastian Gonzales sent at 3/17/2020  8:16 AM CDT -----  Contact: patient  Type:  Patient Returning Call    Who Called:  patient  Who Left Message for Patient:  Pat  Does the patient know what this is regarding?:  yes  Best Call Back Number:  [phone]  Additional Information:  requesting a call  back

## 2020-03-18 ENCOUNTER — OFFICE VISIT (OUTPATIENT)
Dept: ENDOCRINOLOGY | Facility: CLINIC | Age: 60
End: 2020-03-18
Payer: COMMERCIAL

## 2020-03-18 ENCOUNTER — PATIENT MESSAGE (OUTPATIENT)
Dept: ENDOCRINOLOGY | Facility: CLINIC | Age: 60
End: 2020-03-18

## 2020-03-18 ENCOUNTER — LAB VISIT (OUTPATIENT)
Dept: LAB | Facility: HOSPITAL | Age: 60
End: 2020-03-18
Attending: INTERNAL MEDICINE
Payer: COMMERCIAL

## 2020-03-18 ENCOUNTER — TELEPHONE (OUTPATIENT)
Dept: ENDOCRINOLOGY | Facility: CLINIC | Age: 60
End: 2020-03-18

## 2020-03-18 DIAGNOSIS — E03.9 HYPOTHYROIDISM, UNSPECIFIED TYPE: Primary | ICD-10-CM

## 2020-03-18 DIAGNOSIS — E55.9 VITAMIN D DEFICIENCY: ICD-10-CM

## 2020-03-18 DIAGNOSIS — E05.00 GRAVES DISEASE: ICD-10-CM

## 2020-03-18 DIAGNOSIS — E03.9 HYPOTHYROIDISM, UNSPECIFIED TYPE: ICD-10-CM

## 2020-03-18 LAB
25(OH)D3+25(OH)D2 SERPL-MCNC: 33 NG/ML (ref 30–96)
T3 SERPL-MCNC: 90 NG/DL (ref 60–180)
T4 FREE SERPL-MCNC: 1.12 NG/DL (ref 0.71–1.51)
TSH SERPL DL<=0.005 MIU/L-ACNC: 1.31 UIU/ML (ref 0.4–4)

## 2020-03-18 PROCEDURE — 84439 ASSAY OF FREE THYROXINE: CPT

## 2020-03-18 PROCEDURE — 82306 VITAMIN D 25 HYDROXY: CPT

## 2020-03-18 PROCEDURE — 36415 COLL VENOUS BLD VENIPUNCTURE: CPT | Mod: PO

## 2020-03-18 PROCEDURE — 99214 PR OFFICE/OUTPT VISIT, EST, LEVL IV, 30-39 MIN: ICD-10-PCS | Mod: 95,,, | Performed by: INTERNAL MEDICINE

## 2020-03-18 PROCEDURE — 99214 OFFICE O/P EST MOD 30 MIN: CPT | Mod: 95,,, | Performed by: INTERNAL MEDICINE

## 2020-03-18 PROCEDURE — 84443 ASSAY THYROID STIM HORMONE: CPT

## 2020-03-18 PROCEDURE — 84480 ASSAY TRIIODOTHYRONINE (T3): CPT

## 2020-03-18 NOTE — TELEPHONE ENCOUNTER
Your note says to f/u in 6 mo but pt states she normally sees you yearly.  OK to see you in a yr instead?

## 2020-03-18 NOTE — PROGRESS NOTES
Virtual Visit  Start Time- 9:27  End Time- 9:43    CHIEF COMPLAINT: Hypothyroidism/status post Graves  59-year-old female here for followup. Currently on Synthroid 125 mcg 6 days a week and 1/2 tablet on day 7 and Cytomel 5 daily. Having some back pain and scheduled to see pain management. No Palpitations. No tremors. On beta blocker. No increased anxiety.           PAST MEDICAL HISTORY: Graves treated with radioactive iodine in 2000. Also had Graves eye disease    PAST SURGICAL HISTORY: 3 surgeries for Graves eye disease, tonsillectomy, lithotripsy    SOCIAL HISTORY: Does not smoke or drink    FAMILY HISTORY: Hypothyroidism, heart disease, diabetes. Incidentally  had Graves' disease as well    MEDICATIONS/ALLERGIES: The patient's MedCard has been updated and reviewed.         ROS:   Constitutional: weight stable.   ENT: No dysphagia  Cardiovascular: Denies current anginal symptoms  Respiratory: Denies current respiratory difficulty  Gastrointestinal: Has some constipation- No change from before.   GenitoUrinary - No dysuria  Skin: No new skin rash  Neurologic: No focal neurologic complaints     PE: Virtual Visit        ASSESSMENT/PLAN:  1. Hypothyroidism- status post treatment with I-131 for Graves' disease. Will check TFT. No significant symptoms. States main symptom is back pain which she is scheduled to see pain management    2. Graves' eye disease- seeing ophthalmology. No vision changes    3. Vitamin D deficiency- continue current dose of Vit D. Check Vit D as she is taking a high dose.     FOLLOWUP: [facility]  TSH, Ft4, Total T3, Vit D anytime now  F/U 6 months TSH, Ft4, total T3.

## 2020-03-19 ENCOUNTER — OFFICE VISIT (OUTPATIENT)
Dept: PAIN MEDICINE | Facility: CLINIC | Age: 60
End: 2020-03-19
Payer: COMMERCIAL

## 2020-03-19 ENCOUNTER — PATIENT MESSAGE (OUTPATIENT)
Dept: FAMILY MEDICINE | Facility: CLINIC | Age: 60
End: 2020-03-19

## 2020-03-19 VITALS
WEIGHT: 164 LBS | HEART RATE: 72 BPM | HEIGHT: 64 IN | BODY MASS INDEX: 28 KG/M2 | SYSTOLIC BLOOD PRESSURE: 126 MMHG | DIASTOLIC BLOOD PRESSURE: 82 MMHG

## 2020-03-19 DIAGNOSIS — M54.16 LUMBAR RADICULITIS: Primary | ICD-10-CM

## 2020-03-19 DIAGNOSIS — F41.9 ANXIETY: ICD-10-CM

## 2020-03-19 DIAGNOSIS — F51.01 PRIMARY INSOMNIA: ICD-10-CM

## 2020-03-19 DIAGNOSIS — M54.16 RADICULOPATHY, LUMBAR REGION: Primary | ICD-10-CM

## 2020-03-19 DIAGNOSIS — M51.36 DDD (DEGENERATIVE DISC DISEASE), LUMBAR: ICD-10-CM

## 2020-03-19 DIAGNOSIS — M47.896 OTHER SPONDYLOSIS, LUMBAR REGION: ICD-10-CM

## 2020-03-19 PROCEDURE — 99204 OFFICE O/P NEW MOD 45 MIN: CPT | Mod: S$GLB,,, | Performed by: ANESTHESIOLOGY

## 2020-03-19 PROCEDURE — 99204 PR OFFICE/OUTPT VISIT, NEW, LEVL IV, 45-59 MIN: ICD-10-PCS | Mod: S$GLB,,, | Performed by: ANESTHESIOLOGY

## 2020-03-19 PROCEDURE — 3008F BODY MASS INDEX DOCD: CPT | Mod: CPTII,S$GLB,, | Performed by: ANESTHESIOLOGY

## 2020-03-19 PROCEDURE — 99999 PR PBB SHADOW E&M-EST. PATIENT-LVL III: ICD-10-PCS | Mod: PBBFAC,,, | Performed by: ANESTHESIOLOGY

## 2020-03-19 PROCEDURE — 99999 PR PBB SHADOW E&M-EST. PATIENT-LVL III: CPT | Mod: PBBFAC,,, | Performed by: ANESTHESIOLOGY

## 2020-03-19 PROCEDURE — 3008F PR BODY MASS INDEX (BMI) DOCUMENTED: ICD-10-PCS | Mod: CPTII,S$GLB,, | Performed by: ANESTHESIOLOGY

## 2020-03-19 RX ORDER — ALPRAZOLAM 0.5 MG/1
0.5 TABLET ORAL NIGHTLY PRN
Qty: 30 TABLET | Refills: 0 | Status: SHIPPED | OUTPATIENT
Start: 2020-03-19 | End: 2020-10-23 | Stop reason: SDUPTHER

## 2020-03-19 NOTE — H&P (VIEW-ONLY)
This note was completed with dictation software and grammatical errors may exist.    Referring Physician: Gloria Lutz    PCP: Danny Szymanski MD      CC:  Low back and left leg pain    HPI:   Lexi Chaney is a 59 y.o. female who presents with low back and left leg pain.  Pain has worsened over past 3 months.  No recent traumatic incident.  She has constant aching, sharp type pain in her low back and left buttock.  Pain radiates down her left posterior thigh.  Pain worsens sitting, lying, lifting.  Pain improves with standing, walking activity.  She recently had an updated lumbar MRI.  She has been seen by Physical Medicine rehab.  She has tried physical therapy with minimal benefit.  She was referred to us for direct procedure wishes to talk to us specifically about upcoming procedure.  She denies any worsening weakness.  No bowel bladder changes.    ROS:  CONSTITUTIONAL: No fevers, chills, night sweats, wt. loss, appetite changes  SKIN: no rashes or itching  ENT: No headaches, head trauma, vision changes, or eye pain  LYMPH NODES: None noticed   CV: No chest pain, palpitations.   RESP: No shortness of breath, dyspnea on exertion, cough, wheezing, or hemoptysis  GI: No nausea, emesis, diarrhea, constipation, melena, hematochezia, pain.    : No dysuria, hematuria, urgency, or frequency   HEME: No easy bruising, bleeding problems  PSYCHIATRIC: No depression, anxiety, psychosis, hallucinations.  NEURO: No seizures, memory loss, dizziness or difficulty sleeping  MSK:  Positive HPI      Past Medical History:   Diagnosis Date    Abnormal Pap smear     Anxiety     Arthritis     Cataract     Grave's disease     Grave's disease     IBS (irritable bowel syndrome)     rare    Internal hemorrhoids     followed by Dr. Schilling    Nephrolithiasis     followed by Dr. Pope    Thyroid disease     Vitamin D deficiency      Past Surgical History:   Procedure Laterality Date    CATARACT EXTRACTION  11/19/12     right eye (toric)    CATARACT EXTRACTION W/  INTRAOCULAR LENS IMPLANT  11/26/12    left eye (toric)    EXTRACORPOREAL SHOCK WAVE LITHOTRIPSY      EYE SURGERY      eyelid surgery    LITHOTRIPSY      REATTACHMENT OF MUSCLE Right 12/27/2019    Procedure: REATTACHMENT, MUSCLE;  Surgeon: Karoline Jaimes MD;  Location: [facility] OR [address] [place] [address];  Service: Ophthalmology;  Laterality: Right;    tonsillectomy       Family History   Problem Relation Age of Onset    Hypertension Father     Stroke Father     Heart disease Mother         valve    Hypertension Mother     Heart disease Brother     Hypertension Brother     Diabetes Brother     Amblyopia Neg Hx     Blindness Neg Hx     Cancer Neg Hx     Cataracts Neg Hx     Glaucoma Neg Hx     Macular degeneration Neg Hx     Retinal detachment Neg Hx     Strabismus Neg Hx     Thyroid disease Neg Hx     Anesthesia problems Neg Hx     Clotting disorder Neg Hx      Social History     Socioeconomic History    Marital status:      Spouse name: Not on file    Number of children: 2    Years of education: Not on file    Highest education level: Not on file   Occupational History    Occupation:      Employer: [employer]   Social Needs    Financial resource strain: Not hard at all    Food insecurity:     Worry: Never true     Inability: Never true    Transportation needs:     Medical: No     Non-medical: No   Tobacco Use    Smoking status: Never Smoker    Smokeless tobacco: Never Used   Substance and Sexual Activity    Alcohol use: Yes     Frequency: Monthly or less     Drinks per session: 1 or 2     Binge frequency: Never     Comment: q month    Drug use: No    Sexual activity: Yes     Partners: Male     Birth control/protection: Post-menopausal   Lifestyle    Physical activity:     Days per week: 4 days     Minutes per session: 30 min    Stress: Very much   Relationships    Social connections:     Talks on phone: More than three  "times a week     Gets together: Three times a week     Attends Adventism service: Not on file     Active member of club or organization: Yes     Attends meetings of clubs or organizations: More than 4 times per year     Relationship status:    Other Topics Concern    Not on file   Social History Narrative    Not on file         Medications/Allergies: See med card    Vitals:    03/19/20 0924   BP: 126/82   Pulse: 72   Weight: 74.4 kg (164 lb)   Height: 5' 4" (1.626 m)   PainSc:   6   PainLoc: Back         Physical exam:    GENERAL: A and O x3, the patient appears well groomed and is in no acute distress.  Skin: No rashes or obvious lesions  HEENT: normocephalic, atraumatic  CARDIOVASCULAR:  Palpable peripheral pulses  LUNGS: easy work of breathing  ABDOMEN: soft, nontender   UPPER EXTREMITIES: Normal alignment, normal range of motion, no atrophy, no skin changes,  hair growth and nail growth normal and equal bilaterally. No swelling, no tenderness.    LOWER EXTREMITIES:  Normal alignment, normal range of motion, no atrophy, no skin changes,  hair growth and nail growth normal and equal bilaterally. No swelling, no tenderness.    LUMBAR SPINE  Lumbar spine: ROM is full with flexion extension and oblique extension with no increased pain.    Kash's test causes no increased pain on either side.    Supine straight leg raise is negative bilaterally.  Positive on the right at 45°  Internal and external rotation of the hip causes no increased pain on either side.  Myofascial exam: No tenderness to palpation across lumbar paraspinous muscles.      MENTAL STATUS: normal orientation, speech, language, and fund of knowledge for social situation.  Emotional state appropriate.    CRANIAL NERVES:  II:  PERRL bilaterally,   III,IV,VI: EOMI.    V:  Facial sensation equal bilaterally  VII:  Facial motor function normal.  VIII:  Hearing equal to finger rub bilaterally  IX/X: Gag normal, palate symmetric  XI:  Shoulder shrug " equal, head turn equal  XII:  Tongue midline without fasciculations      MOTOR: Tone and bulk: normal bilateral upper and lower Strength: normal   Delt Bi Tri WE WF     R 5 5 5 5 5 5   L 5 5 5 5 5 5     IP ADD ABD Quad TA Gas HAM  R 5 5 5 5 5 5 5  L 5 5 5 5 5 5 5    SENSATION: Light touch and pinprick intact bilaterally  REFLEXES: normal, symmetric, nonbrisk.  Toes down, no clonus. No hoffmans.  GAIT: normal rise, base, steps, and arm swing.        Imaging:  MRI L-spine 3/2020  L3-4: There is a minimal disc bulge and mild facet joint arthropathy.  There is no spinal canal or significant foraminal stenosis.    L4-5: There is trace anterolisthesis of L4 on L5.  There is a diffuse disc bulge with osteophytic ridging eccentric to the left with very subtle left posterolateral annular fissure.  There is moderate-to-marked facet joint arthropathy.  There is left periarticular edema.  There is a 5 x 6 mm left-sided synovial cysts which contacts the left L5 root and results in crowding of the left lateral recess.  There is a trace left facet joint effusion.  There is no spinal stenosis.  There is no significant foraminal stenosis.    L5-S1: There is facet joint arthropathy.  There is a broad disc bulge with central left paracentral dorsal annular fissure.  There is no spinal canal or foraminal stenosis.  The diffuse disc bulge however does contact the left S1 root.    Assessment:  Patient referred for low back and left leg pain  1. Lumbar radiculitis    2. DDD (degenerative disc disease), lumbar    3. Other spondylosis, lumbar region        Plan:  1. I have stressed the importance of physical activity and exercise to improve overall health  2. Reviewed pertinent imaging and records with patient  3. I think that the patient's back pain and radicular leg symptoms are due to degenerative disc disease and have recommended a lumbar epidural steroid injection to the Left L4-5 and L5-S1 level(s).  4. Follow up after  procedure      Thank you for referring this interesting patient, and I look forward to continuing to collaborate in her care.

## 2020-03-19 NOTE — PROGRESS NOTES
This note was completed with dictation software and grammatical errors may exist.    Referring Physician: Gloria Lutz    PCP: Danny Szymanski MD      CC:  Low back and left leg pain    HPI:   Lexi Chaney is a 59 y.o. female who presents with low back and left leg pain.  Pain has worsened over past 3 months.  No recent traumatic incident.  She has constant aching, sharp type pain in her low back and left buttock.  Pain radiates down her left posterior thigh.  Pain worsens sitting, lying, lifting.  Pain improves with standing, walking activity.  She recently had an updated lumbar MRI.  She has been seen by Physical Medicine rehab.  She has tried physical therapy with minimal benefit.  She was referred to us for direct procedure wishes to talk to us specifically about upcoming procedure.  She denies any worsening weakness.  No bowel bladder changes.    ROS:  CONSTITUTIONAL: No fevers, chills, night sweats, wt. loss, appetite changes  SKIN: no rashes or itching  ENT: No headaches, head trauma, vision changes, or eye pain  LYMPH NODES: None noticed   CV: No chest pain, palpitations.   RESP: No shortness of breath, dyspnea on exertion, cough, wheezing, or hemoptysis  GI: No nausea, emesis, diarrhea, constipation, melena, hematochezia, pain.    : No dysuria, hematuria, urgency, or frequency   HEME: No easy bruising, bleeding problems  PSYCHIATRIC: No depression, anxiety, psychosis, hallucinations.  NEURO: No seizures, memory loss, dizziness or difficulty sleeping  MSK:  Positive HPI      Past Medical History:   Diagnosis Date    Abnormal Pap smear     Anxiety     Arthritis     Cataract     Grave's disease     Grave's disease     IBS (irritable bowel syndrome)     rare    Internal hemorrhoids     followed by Dr. Schilling    Nephrolithiasis     followed by Dr. Pope    Thyroid disease     Vitamin D deficiency      Past Surgical History:   Procedure Laterality Date    CATARACT EXTRACTION  11/19/12     right eye (toric)    CATARACT EXTRACTION W/  INTRAOCULAR LENS IMPLANT  11/26/12    left eye (toric)    EXTRACORPOREAL SHOCK WAVE LITHOTRIPSY      EYE SURGERY      eyelid surgery    LITHOTRIPSY      REATTACHMENT OF MUSCLE Right 12/27/2019    Procedure: REATTACHMENT, MUSCLE;  Surgeon: Karoline Jaimes MD;  Location: [facility] OR [address] [place] [address];  Service: Ophthalmology;  Laterality: Right;    tonsillectomy       Family History   Problem Relation Age of Onset    Hypertension Father     Stroke Father     Heart disease Mother         valve    Hypertension Mother     Heart disease Brother     Hypertension Brother     Diabetes Brother     Amblyopia Neg Hx     Blindness Neg Hx     Cancer Neg Hx     Cataracts Neg Hx     Glaucoma Neg Hx     Macular degeneration Neg Hx     Retinal detachment Neg Hx     Strabismus Neg Hx     Thyroid disease Neg Hx     Anesthesia problems Neg Hx     Clotting disorder Neg Hx      Social History     Socioeconomic History    Marital status:      Spouse name: Not on file    Number of children: 2    Years of education: Not on file    Highest education level: Not on file   Occupational History    Occupation:      Employer: [employer]   Social Needs    Financial resource strain: Not hard at all    Food insecurity:     Worry: Never true     Inability: Never true    Transportation needs:     Medical: No     Non-medical: No   Tobacco Use    Smoking status: Never Smoker    Smokeless tobacco: Never Used   Substance and Sexual Activity    Alcohol use: Yes     Frequency: Monthly or less     Drinks per session: 1 or 2     Binge frequency: Never     Comment: q month    Drug use: No    Sexual activity: Yes     Partners: Male     Birth control/protection: Post-menopausal   Lifestyle    Physical activity:     Days per week: 4 days     Minutes per session: 30 min    Stress: Very much   Relationships    Social connections:     Talks on phone: More than three  "times a week     Gets together: Three times a week     Attends Gnosticism service: Not on file     Active member of club or organization: Yes     Attends meetings of clubs or organizations: More than 4 times per year     Relationship status:    Other Topics Concern    Not on file   Social History Narrative    Not on file         Medications/Allergies: See med card    Vitals:    03/19/20 0924   BP: 126/82   Pulse: 72   Weight: 74.4 kg (164 lb)   Height: 5' 4" (1.626 m)   PainSc:   6   PainLoc: Back         Physical exam:    GENERAL: A and O x3, the patient appears well groomed and is in no acute distress.  Skin: No rashes or obvious lesions  HEENT: normocephalic, atraumatic  CARDIOVASCULAR:  Palpable peripheral pulses  LUNGS: easy work of breathing  ABDOMEN: soft, nontender   UPPER EXTREMITIES: Normal alignment, normal range of motion, no atrophy, no skin changes,  hair growth and nail growth normal and equal bilaterally. No swelling, no tenderness.    LOWER EXTREMITIES:  Normal alignment, normal range of motion, no atrophy, no skin changes,  hair growth and nail growth normal and equal bilaterally. No swelling, no tenderness.    LUMBAR SPINE  Lumbar spine: ROM is full with flexion extension and oblique extension with no increased pain.    Kash's test causes no increased pain on either side.    Supine straight leg raise is negative bilaterally.  Positive on the right at 45°  Internal and external rotation of the hip causes no increased pain on either side.  Myofascial exam: No tenderness to palpation across lumbar paraspinous muscles.      MENTAL STATUS: normal orientation, speech, language, and fund of knowledge for social situation.  Emotional state appropriate.    CRANIAL NERVES:  II:  PERRL bilaterally,   III,IV,VI: EOMI.    V:  Facial sensation equal bilaterally  VII:  Facial motor function normal.  VIII:  Hearing equal to finger rub bilaterally  IX/X: Gag normal, palate symmetric  XI:  Shoulder shrug " equal, head turn equal  XII:  Tongue midline without fasciculations      MOTOR: Tone and bulk: normal bilateral upper and lower Strength: normal   Delt Bi Tri WE WF     R 5 5 5 5 5 5   L 5 5 5 5 5 5     IP ADD ABD Quad TA Gas HAM  R 5 5 5 5 5 5 5  L 5 5 5 5 5 5 5    SENSATION: Light touch and pinprick intact bilaterally  REFLEXES: normal, symmetric, nonbrisk.  Toes down, no clonus. No hoffmans.  GAIT: normal rise, base, steps, and arm swing.        Imaging:  MRI L-spine 3/2020  L3-4: There is a minimal disc bulge and mild facet joint arthropathy.  There is no spinal canal or significant foraminal stenosis.    L4-5: There is trace anterolisthesis of L4 on L5.  There is a diffuse disc bulge with osteophytic ridging eccentric to the left with very subtle left posterolateral annular fissure.  There is moderate-to-marked facet joint arthropathy.  There is left periarticular edema.  There is a 5 x 6 mm left-sided synovial cysts which contacts the left L5 root and results in crowding of the left lateral recess.  There is a trace left facet joint effusion.  There is no spinal stenosis.  There is no significant foraminal stenosis.    L5-S1: There is facet joint arthropathy.  There is a broad disc bulge with central left paracentral dorsal annular fissure.  There is no spinal canal or foraminal stenosis.  The diffuse disc bulge however does contact the left S1 root.    Assessment:  Patient referred for low back and left leg pain  1. Lumbar radiculitis    2. DDD (degenerative disc disease), lumbar    3. Other spondylosis, lumbar region        Plan:  1. I have stressed the importance of physical activity and exercise to improve overall health  2. Reviewed pertinent imaging and records with patient  3. I think that the patient's back pain and radicular leg symptoms are due to degenerative disc disease and have recommended a lumbar epidural steroid injection to the Left L4-5 and L5-S1 level(s).  4. Follow up after  procedure      Thank you for referring this interesting patient, and I look forward to continuing to collaborate in her care.

## 2020-03-24 ENCOUNTER — HOSPITAL ENCOUNTER (OUTPATIENT)
Facility: AMBULARY SURGERY CENTER | Age: 60
Discharge: HOME | End: 2020-03-24
Attending: ANESTHESIOLOGY | Admitting: ANESTHESIOLOGY
Payer: COMMERCIAL

## 2020-03-24 DIAGNOSIS — M54.16 LUMBAR RADICULITIS: Primary | ICD-10-CM

## 2020-03-24 PROCEDURE — 64484 NJX AA&/STRD TFRM EPI L/S EA: CPT | Mod: LT,,, | Performed by: ANESTHESIOLOGY

## 2020-03-24 PROCEDURE — 64484 NJX AA&/STRD TFRM EPI L/S EA: CPT | Performed by: ANESTHESIOLOGY

## 2020-03-24 PROCEDURE — 64483 NJX AA&/STRD TFRM EPI L/S 1: CPT | Performed by: ANESTHESIOLOGY

## 2020-03-24 PROCEDURE — 64483 NJX AA&/STRD TFRM EPI L/S 1: CPT | Mod: LT,,, | Performed by: ANESTHESIOLOGY

## 2020-03-24 PROCEDURE — 64483 PR EPIDURAL INJ, ANES/STEROID, TRANSFORAMINAL, LUMB/SACR, SNGL LEVL: ICD-10-PCS | Mod: LT,,, | Performed by: ANESTHESIOLOGY

## 2020-03-24 PROCEDURE — 64484 PRA INJECT ANES/STEROID FORAMEN LUMBAR/SACRAL W IMG GUIDE ,EA ADD LEVEL: ICD-10-PCS | Mod: LT,,, | Performed by: ANESTHESIOLOGY

## 2020-03-24 RX ORDER — LIDOCAINE HYDROCHLORIDE 10 MG/ML
INJECTION, SOLUTION EPIDURAL; INFILTRATION; INTRACAUDAL; PERINEURAL
Status: DISCONTINUED | OUTPATIENT
Start: 2020-03-24 | End: 2020-03-24 | Stop reason: HOSPADM

## 2020-03-24 RX ORDER — SODIUM CHLORIDE, SODIUM LACTATE, POTASSIUM CHLORIDE, CALCIUM CHLORIDE 600; 310; 30; 20 MG/100ML; MG/100ML; MG/100ML; MG/100ML
INJECTION, SOLUTION INTRAVENOUS ONCE AS NEEDED
Status: COMPLETED | OUTPATIENT
Start: 2020-03-24 | End: 2020-03-24

## 2020-03-24 RX ORDER — FENTANYL CITRATE 50 UG/ML
INJECTION, SOLUTION INTRAMUSCULAR; INTRAVENOUS
Status: DISCONTINUED | OUTPATIENT
Start: 2020-03-24 | End: 2020-03-24 | Stop reason: HOSPADM

## 2020-03-24 RX ORDER — BUPIVACAINE HYDROCHLORIDE 2.5 MG/ML
INJECTION, SOLUTION EPIDURAL; INFILTRATION; INTRACAUDAL
Status: DISCONTINUED | OUTPATIENT
Start: 2020-03-24 | End: 2020-03-24 | Stop reason: HOSPADM

## 2020-03-24 RX ORDER — DEXAMETHASONE SODIUM PHOSPHATE 10 MG/ML
INJECTION INTRAMUSCULAR; INTRAVENOUS
Status: DISCONTINUED | OUTPATIENT
Start: 2020-03-24 | End: 2020-03-24 | Stop reason: HOSPADM

## 2020-03-24 RX ORDER — MIDAZOLAM HYDROCHLORIDE 1 MG/ML
INJECTION INTRAMUSCULAR; INTRAVENOUS
Status: DISCONTINUED | OUTPATIENT
Start: 2020-03-24 | End: 2020-03-24 | Stop reason: HOSPADM

## 2020-03-24 RX ADMIN — SODIUM CHLORIDE, SODIUM LACTATE, POTASSIUM CHLORIDE, CALCIUM CHLORIDE: 600; 310; 30; 20 INJECTION, SOLUTION INTRAVENOUS at 08:03

## 2020-03-24 NOTE — PLAN OF CARE
"Pt received from OR via stretcher. Placed on bedside monitors. VS stable. Ice pack placed to left lower back. offered and accepted po drink. Reports pain is less than from arrival at this point. Pt slept X 20 minutes, then aroused without complaints. IVF stopped and IV removed. Reviewed discharge instructions with pt. Pt then assisted to sit at bedside and perform "leg exercises" to stimulate sensory and motor nerves in legs before getting OOB. Pt dressed and then transported to car via w/c with friend, Leonie driving her home.  "

## 2020-03-24 NOTE — DISCHARGE INSTRUCTIONS
Before leaving, please make sure you have all your personal belongings such as glasses, purses, wallets, keys, cell phones, jewelry, jackets etc Pain injection instructions:     This procedure may take a couple weeks to relieve pain  You may get some pain relief from the local anesthetic initally.    No driving for 24 hrs.   Activity as tolerated- gradually increase activities.  Dont lift over 10 lbs for 24 hrs   No heat at injection sites x 2 days. No heating pads, hot tubs, saunas, or swimming in any body of water or pool for 2 days.  Use ice pack for mild swelling and for comfort , apply for 20 minutes, remove for 20 minute intervals. No direct contact of ice itself  to skin.  May shower today. No tub baths for two days.      Resume Aspirin, Plavix, or Coumadin the day after the procedure unless otherwise instructed.   If diabetic,monitor your glucose carefully as steroids can increase your glucose level    Seek immediate medical help for:   Severe increase in your usual pain or appearance of new pain.  Prolonged (mor than 8 hours) or increasing weakness or numbness in the legs or arms. Numbing medicine was injected and can affect the messages to and from the brain and legs or arms.  .    Fever above 101 ,Drainage,redness,active bleeding, or increased swelling at the injection site.  Headache, shortness of breath, chest pain, or breathing problems.    After Surgery:  Always be aware that any surgery can cause these symptoms:    Pain- Medication can be prescribed for pain to decrease your pain but may not completely take your pain away. Over the Counter pain medicine my be enough and you can always use Ice and rest to help ease pain.    Bleeding- a little bleeding after a surgery is usually within normal.  If there is a lot of blood you need to notify your MD.  Emergency treatments of bleeding are cold application, elevation of the bleeding site and compression.    Infection- Infection after surgery is NOT a  normal occurrence.  Signs of infection are fever, swelling, hot to touch the incision.  If this occurs notify your MD immediately.    Nausea- this can be common after a surgery especially if you have had anesthesia medicine or are taking pain medicine.  Steroids have a side effect of nausea sometimes. Staying on clear liquids, bland foods, gingerale, or over the counter anti nausea medicines can help.  If you vomit more than once, notify your MD.  Anti Nausea medicines can be prescribed.

## 2020-03-24 NOTE — DISCHARGE SUMMARY
[facility]  Discharge Note  Short Stay    Admit Date: 3/24/2020    Discharge Date and Time: 3/24/2020    Attending Physician: Marshall Lane MD     Discharge Provider: Marshall Lane    Diagnoses:  Active Hospital Problems    Diagnosis  POA    *Lumbar radiculitis [M54.16]  Yes      Resolved Hospital Problems   No resolved problems to display.       Hospital Course: Lumbar BESSIE  Discharged Condition: Good    Final Diagnoses:   Active Hospital Problems    Diagnosis  POA    *Lumbar radiculitis [M54.16]  Yes      Resolved Hospital Problems   No resolved problems to display.       Disposition: Home or Self Care    Follow up/Patient Instructions:    Medications:  Reconciled Home Medications:      Medication List      CONTINUE taking these medications    ALPRAZolam 0.5 MG tablet  Commonly known as:  XANAX  Take 1 tablet (0.5 mg total) by mouth nightly as needed.     BenadryL 25 mg capsule  Generic drug:  diphenhydrAMINE  Take 25 mg by mouth nightly as needed for Allergies or Insomnia.     cholecalciferol (vitamin D3) 125 mcg (5,000 unit) Tab  Take 5,000 Units by mouth every other day.     dicyclomine 20 mg tablet  Commonly known as:  BENTYL  Take 1 tablet (20 mg total) by mouth every 8 (eight) hours as needed.     HYDROcodone-acetaminophen 5-325 mg per tablet  Commonly known as:  NORCO  Take 1 tablet by mouth every 6 (six) hours as needed for Pain.     liothyronine 5 MCG Tab  Commonly known as:  CYTOMEL  TAKE 1 TABLET BY MOUTH EVERY DAY     metoprolol succinate 25 MG 24 hr tablet  Commonly known as:  TOPROL-XL  TAKE 1/2 TABLET BY MOUTH ONCE DAILY     SYNTHROID 125 MCG tablet  Generic drug:  levothyroxine  TAKE 1 TABLET BY MOUTH EVERY DAY FOR 6 DAYS A WEEK AND 1/2 TABLET ON DAY 7          Discharge Procedure Orders   Call MD for:  temperature >100.4     Call MD for:  persistent nausea and vomiting or diarrhea     Call MD for:  severe uncontrolled pain     Call MD for:  redness, tenderness, or signs of infection (pain,  swelling, redness, odor or green/yellow discharge around incision site)     Call MD for:  difficulty breathing or increased cough     Call MD for:  severe persistent headache        Follow up with MD in 2-3 weeks    Discharge Procedure Orders (must include Diet, Follow-up, Activity):   Discharge Procedure Orders (must include Diet, Follow-up, Activity)   Call MD for:  temperature >100.4     Call MD for:  persistent nausea and vomiting or diarrhea     Call MD for:  severe uncontrolled pain     Call MD for:  redness, tenderness, or signs of infection (pain, swelling, redness, odor or green/yellow discharge around incision site)     Call MD for:  difficulty breathing or increased cough     Call MD for:  severe persistent headache

## 2020-03-24 NOTE — OP NOTE
PROCEDURE DATE: 3/24/2020    PROCEDURE: Left L4-5, L5-S1 transforaminal epidural steroid injection under fluoroscopy    DIAGNOSIS: Lumbar disc displacement without myelopathy  Post op diagnosis: Same    PHYSICIAN: Marshall Lane MD    MEDICATIONS INJECTED:  Dexamethasone 5mg (0.5ml) and 1.5ml 0.25% bupivicaine at each nerve root.     LOCAL ANESTHETIC INJECTED:  Lidocaine 1%. 2 ml per site.    SEDATION MEDICATIONS: RN IV sedation    ESTIMATED BLOOD LOSS:  None    COMPLICATIONS:  None    TECHNIQUE:   A time-out was taken to identify patient and procedure side prior to starting the procedure. The patient was placed in a prone position, prepped and draped in the usual sterile fashion using ChloraPrep and sterile towels.  The area to be injected was determined under fluoroscopic guidance in AP and oblique view.  Local anesthetic was given by raising a wheal and going down to the hub of a 25-gauge 1.5 inch needle.  In oblique view, a 3.5 inch 22-gauge bent-tip spinal needle was introduced towards 6 oclock position of the pedicle of each above named nerve root level.  The needle was walked medially then hinged into the neural foramen and position was confirmed in AP and lateral views.  1ml contrast dye was injected to confirm appropriate placement and that there was no vascular uptake.  After negative aspiration for blood or CSF, the medication was then injected. This was performed at the left L4-5, L5-S1 level(s). The patient tolerated the procedure well.    The patient was monitored after the procedure.  Patient was given post procedure and discharge instructions to follow at home. The patient was discharged in a stable condition.    The procedure was deemed urgent due to the severity of the patient's pain. Patient would have presented to the emergency department. We decided that the risk of the procedure today outweighs the risk of presenting to the ED where she could possibly be exposed to COVID  Procedure is required to  treat an emergency medical condition AND that in the absence of immediate medical attention would be reasonably expected to result in placing the health of the individual  in serious jeopardy, serious impairment to bodily functions, or serious dysfunction of bodily organs

## 2020-03-27 VITALS
RESPIRATION RATE: 18 BRPM | OXYGEN SATURATION: 94 % | HEIGHT: 64 IN | SYSTOLIC BLOOD PRESSURE: 113 MMHG | BODY MASS INDEX: 28 KG/M2 | HEART RATE: 71 BPM | TEMPERATURE: 98 F | DIASTOLIC BLOOD PRESSURE: 63 MMHG | WEIGHT: 164 LBS

## 2020-05-12 RX ORDER — LIOTHYRONINE SODIUM 5 UG/1
TABLET ORAL
Qty: 90 TABLET | Refills: 1 | Status: SHIPPED | OUTPATIENT
Start: 2020-05-12 | End: 2021-01-13

## 2020-05-14 RX ORDER — LEVOTHYROXINE SODIUM 125 UG/1
TABLET ORAL
Qty: 78 TABLET | Refills: 2 | Status: SHIPPED | OUTPATIENT
Start: 2020-05-14 | End: 2020-10-27

## 2020-06-12 ENCOUNTER — OFFICE VISIT (OUTPATIENT)
Dept: PAIN MEDICINE | Facility: CLINIC | Age: 60
End: 2020-06-12
Payer: COMMERCIAL

## 2020-06-12 VITALS
HEIGHT: 64 IN | DIASTOLIC BLOOD PRESSURE: 71 MMHG | HEART RATE: 76 BPM | SYSTOLIC BLOOD PRESSURE: 117 MMHG | WEIGHT: 164 LBS | BODY MASS INDEX: 28 KG/M2

## 2020-06-12 DIAGNOSIS — M47.896 OTHER SPONDYLOSIS, LUMBAR REGION: ICD-10-CM

## 2020-06-12 DIAGNOSIS — M51.36 DDD (DEGENERATIVE DISC DISEASE), LUMBAR: Primary | ICD-10-CM

## 2020-06-12 PROCEDURE — 99214 OFFICE O/P EST MOD 30 MIN: CPT | Mod: S$GLB,,, | Performed by: PHYSICIAN ASSISTANT

## 2020-06-12 PROCEDURE — 99999 PR PBB SHADOW E&M-EST. PATIENT-LVL IV: CPT | Mod: PBBFAC,,, | Performed by: PHYSICIAN ASSISTANT

## 2020-06-12 PROCEDURE — 3008F PR BODY MASS INDEX (BMI) DOCUMENTED: ICD-10-PCS | Mod: CPTII,S$GLB,, | Performed by: PHYSICIAN ASSISTANT

## 2020-06-12 PROCEDURE — 3008F BODY MASS INDEX DOCD: CPT | Mod: CPTII,S$GLB,, | Performed by: PHYSICIAN ASSISTANT

## 2020-06-12 PROCEDURE — 99214 PR OFFICE/OUTPT VISIT, EST, LEVL IV, 30-39 MIN: ICD-10-PCS | Mod: S$GLB,,, | Performed by: PHYSICIAN ASSISTANT

## 2020-06-12 PROCEDURE — 99999 PR PBB SHADOW E&M-EST. PATIENT-LVL IV: ICD-10-PCS | Mod: PBBFAC,,, | Performed by: PHYSICIAN ASSISTANT

## 2020-06-12 NOTE — PROGRESS NOTES
Referring Physician: No ref. provider found    PCP: Danny Szymanski MD      CC:  Low back and left leg pain    Interval History:  Lexi Chaney is a 59 y.o. female with low back pain who presents today to discuss treatment options. She is s/p left TF BESSIE at L4-5 and L5-S1. Reports she had >95% relief. She continues to benefit but pain is returning. She was very happy with results. She is interested in completing PT. Denies any radiation into LE. Does c/o some left buttock pain. Denies b/b changes. Pain today is rated 5/10.     Pt has been seen in the clinic before, however pt is new to me.     History below per Dr. Lane    HPI:   Lexi Chaney is a 59 y.o. female who presents with low back and left leg pain.  Pain has worsened over past 3 months.  No recent traumatic incident.  She has constant aching, sharp type pain in her low back and left buttock.  Pain radiates down her left posterior thigh.  Pain worsens sitting, lying, lifting.  Pain improves with standing, walking activity.  She recently had an updated lumbar MRI.  She has been seen by Physical Medicine rehab.  She has tried physical therapy with minimal benefit.  She was referred to us for direct procedure wishes to talk to us specifically about upcoming procedure.  She denies any worsening weakness.  No bowel bladder changes.    ROS:  CONSTITUTIONAL: No fevers, chills, night sweats, wt. loss, appetite changes  SKIN: no rashes or itching  ENT: No headaches, head trauma, vision changes, or eye pain  LYMPH NODES: None noticed   CV: No chest pain, palpitations.   RESP: No shortness of breath, dyspnea on exertion, cough, wheezing, or hemoptysis  GI: No nausea, emesis, diarrhea, constipation, melena, hematochezia, pain.    : No dysuria, hematuria, urgency, or frequency   HEME: No easy bruising, bleeding problems  PSYCHIATRIC: No depression, anxiety, psychosis, hallucinations.  NEURO: No seizures, memory loss, dizziness or difficulty sleeping  MSK:   Positive HPI      Past Medical History:   Diagnosis Date    Abnormal Pap smear     Anxiety     Arthritis     Cataract     Grave's disease     Grave's disease     IBS (irritable bowel syndrome)     rare    Internal hemorrhoids     followed by Dr. Schilling    Nephrolithiasis     followed by Dr. Pope    Thyroid disease     Vitamin D deficiency      Past Surgical History:   Procedure Laterality Date    CATARACT EXTRACTION  11/19/12    right eye (toric)    CATARACT EXTRACTION W/  INTRAOCULAR LENS IMPLANT  11/26/12    left eye (toric)    EXTRACORPOREAL SHOCK WAVE LITHOTRIPSY      EYE SURGERY      eyelid surgery    LITHOTRIPSY      REATTACHMENT OF MUSCLE Right 12/27/2019    Procedure: REATTACHMENT, MUSCLE;  Surgeon: Karoline Jaimes MD;  Location: [facility] OR [address] [place] [address];  Service: Ophthalmology;  Laterality: Right;    tonsillectomy      TRANSFORAMINAL EPIDURAL INJECTION OF STEROID Left 3/24/2020    Procedure: Injection,steroid,epidural,transforaminal approach;  Surgeon: Marshall Lane MD;  Location: [facility];  Service: Pain Management;  Laterality: Left;  L4-5, L5-S1     Family History   Problem Relation Age of Onset    Hypertension Father     Stroke Father     Heart disease Mother         valve    Hypertension Mother     Heart disease Brother     Hypertension Brother     Diabetes Brother     Amblyopia Neg Hx     Blindness Neg Hx     Cancer Neg Hx     Cataracts Neg Hx     Glaucoma Neg Hx     Macular degeneration Neg Hx     Retinal detachment Neg Hx     Strabismus Neg Hx     Thyroid disease Neg Hx     Anesthesia problems Neg Hx     Clotting disorder Neg Hx      Social History     Socioeconomic History    Marital status:      Spouse name: Not on file    Number of children: 2    Years of education: Not on file    Highest education level: Not on file   Occupational History    Occupation:      Employer: [employer]   Social Needs    Financial resource strain: Not hard  "at all    Food insecurity:     Worry: Never true     Inability: Never true    Transportation needs:     Medical: No     Non-medical: No   Tobacco Use    Smoking status: Never Smoker    Smokeless tobacco: Never Used   Substance and Sexual Activity    Alcohol use: Yes     Frequency: Monthly or less     Drinks per session: 1 or 2     Binge frequency: Never     Comment: q month    Drug use: No    Sexual activity: Yes     Partners: Male     Birth control/protection: Post-menopausal   Lifestyle    Physical activity:     Days per week: 4 days     Minutes per session: 30 min    Stress: Very much   Relationships    Social connections:     Talks on phone: More than three times a week     Gets together: Three times a week     Attends [employer] service: Not on file     Active member of club or organization: Yes     Attends meetings of clubs or organizations: More than 4 times per year     Relationship status:    Other Topics Concern    Not on file   Social History Narrative    Not on file         Medications/Allergies: See med card    Vitals:    06/12/20 1102   BP: 117/71   Pulse: 76   Weight: 74.4 kg (164 lb)   Height: 5' 4" (1.626 m)   PainSc:   5   PainLoc: Back         Physical exam:    GENERAL: A and O x3, the patient appears well groomed and is in no acute distress.  Skin: No rashes or obvious lesions  HEENT: normocephalic, atraumatic  CARDIOVASCULAR:  RRR  LUNGS: non labored breathing  ABDOMEN: soft, nontender   UPPER EXTREMITIES: Normal alignment, normal range of motion, no atrophy, no skin changes,  hair growth and nail growth normal and equal bilaterally. No swelling, no tenderness.    LOWER EXTREMITIES:  Normal alignment, normal range of motion, no atrophy, no skin changes,  hair growth and nail growth normal and equal bilaterally. No swelling, no tenderness.    LUMBAR SPINE  Lumbar spine: ROM is full with flexion extension and oblique extension with no increased pain.    Kash's test causes no " increased pain on either side.    Supine straight leg raise is negative bilaterally  Internal and external rotation of the hip causes no increased pain on either side.  Myofascial exam: No tenderness to palpation across lumbar paraspinous muscles.      MENTAL STATUS: normal orientation, speech, language, and fund of knowledge for social situation.  Emotional state appropriate.    CRANIAL NERVES:  II:  PERRL bilaterally,   III,IV,VI: EOMI.    V:  Facial sensation equal bilaterally  VII:  Facial motor function normal.  VIII:  Hearing equal to finger rub bilaterally  IX/X: Gag normal, palate symmetric  XI:  Shoulder shrug equal, head turn equal  XII:  Tongue midline without fasciculations      MOTOR: Tone and bulk: normal bilateral upper and lower Strength: normal   Delt Bi Tri WE WF     R 5 5 5 5 5 5   L 5 5 5 5 5 5     IP ADD ABD Quad TA Gas HAM  R 5 5 5 5 5 5 5  L 5 5 5 5 5 5 5    SENSATION: Light touch and pinprick intact bilaterally  REFLEXES: normal, symmetric, nonbrisk.  Toes down, no clonus. No hoffmans.  GAIT: normal rise, base, steps, and arm swing.        Imaging:  MRI L-spine 3/2020  L3-4: There is a minimal disc bulge and mild facet joint arthropathy.  There is no spinal canal or significant foraminal stenosis.    L4-5: There is trace anterolisthesis of L4 on L5.  There is a diffuse disc bulge with osteophytic ridging eccentric to the left with very subtle left posterolateral annular fissure.  There is moderate-to-marked facet joint arthropathy.  There is left periarticular edema.  There is a 5 x 6 mm left-sided synovial cysts which contacts the left L5 root and results in crowding of the left lateral recess.  There is a trace left facet joint effusion.  There is no spinal stenosis.  There is no significant foraminal stenosis.    L5-S1: There is facet joint arthropathy.  There is a broad disc bulge with central left paracentral dorsal annular fissure.  There is no spinal canal or foraminal stenosis.   The diffuse disc bulge however does contact the left S1 root.    Assessment:  Lexi Chaney is a 59 y.o. female with   1. DDD (degenerative disc disease), lumbar    2. Other spondylosis, lumbar region        Plan:  1. I have stressed the importance of physical activity and exercise to improve overall health  2. Monitor progress and consider repeat lumbar epidural steroid injection to the Left L4-5 and L5-S1 level(s). Will call if pain worsens  3. Ordered PT to focus on low back. Will be beneifical to prevent further muscle atrophy and increase mobility and strength   4. Consider lumbar MBB workup in the future.   5. F/u prn

## 2020-06-22 ENCOUNTER — CLINICAL SUPPORT (OUTPATIENT)
Dept: REHABILITATION | Facility: HOSPITAL | Age: 60
End: 2020-06-22
Payer: COMMERCIAL

## 2020-06-22 DIAGNOSIS — M51.36 DDD (DEGENERATIVE DISC DISEASE), LUMBAR: ICD-10-CM

## 2020-06-22 DIAGNOSIS — R53.81 DEBILITY: ICD-10-CM

## 2020-06-22 PROBLEM — M51.369 DDD (DEGENERATIVE DISC DISEASE), LUMBAR: Status: ACTIVE | Noted: 2020-06-22

## 2020-06-22 PROCEDURE — 97110 THERAPEUTIC EXERCISES: CPT | Mod: PN | Performed by: PHYSICAL THERAPIST

## 2020-06-22 PROCEDURE — 97161 PT EVAL LOW COMPLEX 20 MIN: CPT | Mod: PN | Performed by: PHYSICAL THERAPIST
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
## 2020-06-25 ENCOUNTER — TELEPHONE (OUTPATIENT)
Dept: PAIN MEDICINE | Facility: CLINIC | Age: 60
End: 2020-06-25

## 2020-06-25 DIAGNOSIS — M54.16 RADICULOPATHY, LUMBAR REGION: ICD-10-CM

## 2020-06-25 DIAGNOSIS — Z01.818 PRE-OP TESTING: Primary | ICD-10-CM

## 2020-06-25 NOTE — TELEPHONE ENCOUNTER
----- Message from Sebastian Gonzales sent at 6/25/2020 10:44 AM CDT -----  Type: Needs Medical Advice  Who Called: patient  Symptoms (please be specific):    How long has patient had these symptoms:    Pharmacy name and phone #:    Best Call Back Number: [phone]  Additional Information: requesting a call back to schedule procedure

## 2020-06-25 NOTE — TELEPHONE ENCOUNTER
----- Message from Laxmi Panda sent at 6/25/2020 10:59 AM CDT -----  Regarding: Returning Call  Contact: pt  Type: Returning Call    Who Called: pt  Who Left the patient a message: Pat  Does the pt  know what this is regarding:  yes,to schedule a procedure  Best Call back number:  [phone]  Additional information:  Pt would like a call back

## 2020-07-04 ENCOUNTER — LAB VISIT (OUTPATIENT)
Dept: PRIMARY CARE CLINIC | Facility: CLINIC | Age: 60
End: 2020-07-04
Payer: COMMERCIAL

## 2020-07-04 DIAGNOSIS — Z01.818 PRE-OP TESTING: ICD-10-CM

## 2020-07-04 PROCEDURE — U0003 INFECTIOUS AGENT DETECTION BY NUCLEIC ACID (DNA OR RNA); SEVERE ACUTE RESPIRATORY SYNDROME CORONAVIRUS 2 (SARS-COV-2) (CORONAVIRUS DISEASE [COVID-19]), AMPLIFIED PROBE TECHNIQUE, MAKING USE OF HIGH THROUGHPUT TECHNOLOGIES AS DESCRIBED BY CMS-2020-01-R: HCPCS

## 2020-07-05 LAB — SARS-COV-2 RNA RESP QL NAA+PROBE: DETECTED

## 2020-07-06 ENCOUNTER — TELEPHONE (OUTPATIENT)
Dept: PAIN MEDICINE | Facility: CLINIC | Age: 60
End: 2020-07-06

## 2020-07-06 DIAGNOSIS — U07.1 COVID-19 VIRUS DETECTED: ICD-10-CM

## 2020-07-06 NOTE — TELEPHONE ENCOUNTER
Pt rescheduled for 7/17. Pt stated she as no symptoms , afebrile. Pt will let us know if anything changes.

## 2020-07-06 NOTE — TELEPHONE ENCOUNTER
----- Message from Palma Caceres RN sent at 7/6/2020  7:12 AM CDT -----   Patient is covid positive.  We will instruct her per policy. Advise whether to cancel or put on a dummy date.  She will have to be more that 10 day no fever, symptoms or on any antiinflammatories.. (tylenol).  Thanks

## 2020-07-13 ENCOUNTER — HOSPITAL ENCOUNTER (EMERGENCY)
Facility: HOSPITAL | Age: 60
Discharge: HOME | End: 2020-07-13
Attending: EMERGENCY MEDICINE
Payer: COMMERCIAL

## 2020-07-13 VITALS
HEIGHT: 64 IN | RESPIRATION RATE: 20 BRPM | HEART RATE: 91 BPM | SYSTOLIC BLOOD PRESSURE: 136 MMHG | BODY MASS INDEX: 27.31 KG/M2 | OXYGEN SATURATION: 93 % | DIASTOLIC BLOOD PRESSURE: 72 MMHG | WEIGHT: 160 LBS | TEMPERATURE: 98 F

## 2020-07-13 DIAGNOSIS — R51.9 NONINTRACTABLE EPISODIC HEADACHE, UNSPECIFIED HEADACHE TYPE: Primary | ICD-10-CM

## 2020-07-13 DIAGNOSIS — U07.1 COVID-19: ICD-10-CM

## 2020-07-13 DIAGNOSIS — R11.2 NON-INTRACTABLE VOMITING WITH NAUSEA, UNSPECIFIED VOMITING TYPE: ICD-10-CM

## 2020-07-13 PROCEDURE — 99284 EMERGENCY DEPT VISIT MOD MDM: CPT | Mod: 25

## 2020-07-13 PROCEDURE — 63600175 PHARM REV CODE 636 W HCPCS: Performed by: EMERGENCY MEDICINE

## 2020-07-13 PROCEDURE — 96375 TX/PRO/DX INJ NEW DRUG ADDON: CPT

## 2020-07-13 PROCEDURE — 96374 THER/PROPH/DIAG INJ IV PUSH: CPT

## 2020-07-13 PROCEDURE — 96361 HYDRATE IV INFUSION ADD-ON: CPT

## 2020-07-13 PROCEDURE — 25000003 PHARM REV CODE 250: Performed by: EMERGENCY MEDICINE

## 2020-07-13 RX ORDER — BUTALBITAL, ACETAMINOPHEN AND CAFFEINE 50; 325; 40 MG/1; MG/1; MG/1
1 TABLET ORAL EVERY 4 HOURS PRN
Qty: 15 TABLET | Refills: 0 | Status: SHIPPED | OUTPATIENT
Start: 2020-07-13 | End: 2020-08-12

## 2020-07-13 RX ORDER — BUTALBITAL, ACETAMINOPHEN AND CAFFEINE 50; 325; 40 MG/1; MG/1; MG/1
1 TABLET ORAL
Status: DISCONTINUED | OUTPATIENT
Start: 2020-07-13 | End: 2020-07-14 | Stop reason: HOSPADM

## 2020-07-13 RX ORDER — SODIUM CHLORIDE 9 MG/ML
1000 INJECTION, SOLUTION INTRAVENOUS
Status: COMPLETED | OUTPATIENT
Start: 2020-07-13 | End: 2020-07-13

## 2020-07-13 RX ORDER — ONDANSETRON HYDROCHLORIDE 8 MG/1
8 TABLET, FILM COATED ORAL EVERY 12 HOURS PRN
Qty: 8 TABLET | Refills: 1 | Status: SHIPPED | OUTPATIENT
Start: 2020-07-13 | End: 2021-08-12

## 2020-07-13 RX ORDER — KETOROLAC TROMETHAMINE 30 MG/ML
15 INJECTION, SOLUTION INTRAMUSCULAR; INTRAVENOUS
Status: COMPLETED | OUTPATIENT
Start: 2020-07-13 | End: 2020-07-13

## 2020-07-13 RX ORDER — ACETAMINOPHEN 325 MG/1
325 TABLET ORAL
Status: DISCONTINUED | OUTPATIENT
Start: 2020-07-13 | End: 2020-07-14 | Stop reason: HOSPADM

## 2020-07-13 RX ORDER — METOCLOPRAMIDE HYDROCHLORIDE 5 MG/ML
10 INJECTION INTRAMUSCULAR; INTRAVENOUS
Status: COMPLETED | OUTPATIENT
Start: 2020-07-13 | End: 2020-07-13

## 2020-07-13 RX ORDER — DIPHENHYDRAMINE HYDROCHLORIDE 50 MG/ML
25 INJECTION INTRAMUSCULAR; INTRAVENOUS
Status: COMPLETED | OUTPATIENT
Start: 2020-07-13 | End: 2020-07-13

## 2020-07-13 RX ADMIN — METOCLOPRAMIDE 10 MG: 5 INJECTION, SOLUTION INTRAMUSCULAR; INTRAVENOUS at 07:07

## 2020-07-13 RX ADMIN — SODIUM CHLORIDE 1000 ML: 0.9 INJECTION, SOLUTION INTRAVENOUS at 07:07

## 2020-07-13 RX ADMIN — DIPHENHYDRAMINE HYDROCHLORIDE 25 MG: 50 INJECTION INTRAMUSCULAR; INTRAVENOUS at 07:07

## 2020-07-13 RX ADMIN — KETOROLAC TROMETHAMINE 15 MG: 30 INJECTION, SOLUTION INTRAMUSCULAR at 07:07

## 2020-07-13 NOTE — ED PROVIDER NOTES
Encounter Date: 7/13/2020       History     Chief Complaint   Patient presents with    Headache    COVID-19 Concerns     positive covid july 4 th      Chief complaint:  Headache    HPI:  59-year-old female diagnosed with COVID-19 on 07/04/2020 presents with a 1 day history of nausea, vomiting with severe bilateral frontal headache.  She also describes anorexia.  She has no cough or shortness of breath and denies any diarrhea.  Past medical history is significant for anxiety, Graves disease, irritable bowel disease and nephrolithiasis        Review of patient's allergies indicates:   Allergen Reactions    Sulfa (sulfonamide antibiotics) Itching and Other (See Comments)     Hyperventilation     Past Medical History:   Diagnosis Date    Abnormal Pap smear     Anxiety     Arthritis     Cataract     Grave's disease     Grave's disease     IBS (irritable bowel syndrome)     rare    Internal hemorrhoids     followed by Dr. Schilling    Nephrolithiasis     followed by Dr. Pope    Thyroid disease     Vitamin D deficiency      Past Surgical History:   Procedure Laterality Date    CATARACT EXTRACTION  11/19/12    right eye (toric)    CATARACT EXTRACTION W/  INTRAOCULAR LENS IMPLANT  11/26/12    left eye (toric)    EXTRACORPOREAL SHOCK WAVE LITHOTRIPSY      EYE SURGERY      eyelid surgery    LITHOTRIPSY      REATTACHMENT OF MUSCLE Right 12/27/2019    Procedure: REATTACHMENT, MUSCLE;  Surgeon: Karoline Jaimes MD;  Location: [address];  Service: Ophthalmology;  Laterality: Right;    tonsillectomy      TRANSFORAMINAL EPIDURAL INJECTION OF STEROID Left 3/24/2020    Procedure: Injection,steroid,epidural,transforaminal approach;  Surgeon: Marshall Lane MD;  Location: [facility] OR;  Service: Pain Management;  Laterality: Left;  L4-5, L5-S1     Family History   Problem Relation Age of Onset    Hypertension Father     Stroke Father     Heart disease Mother         valve    Hypertension Mother     Heart disease  Brother     Hypertension Brother     Diabetes Brother     Amblyopia Neg Hx     Blindness Neg Hx     Cancer Neg Hx     Cataracts Neg Hx     Glaucoma Neg Hx     Macular degeneration Neg Hx     Retinal detachment Neg Hx     Strabismus Neg Hx     Thyroid disease Neg Hx     Anesthesia problems Neg Hx     Clotting disorder Neg Hx      Social History     Tobacco Use    Smoking status: Never Smoker    Smokeless tobacco: Never Used   Substance Use Topics    Alcohol use: Yes     Frequency: Monthly or less     Drinks per session: 1 or 2     Binge frequency: Never     Comment: q month    Drug use: No     Review of Systems   Constitutional: Positive for fatigue. Negative for activity change, appetite change, chills and fever.   Eyes: Negative for visual disturbance.   Respiratory: Negative for apnea and shortness of breath.    Cardiovascular: Negative for chest pain and palpitations.   Gastrointestinal: Positive for nausea and vomiting. Negative for abdominal distention and abdominal pain.   Genitourinary: Negative for difficulty urinating.   Musculoskeletal: Negative for neck pain.   Skin: Negative for pallor and rash.   Neurological: Positive for headaches.   Hematological: Does not bruise/bleed easily.   Psychiatric/Behavioral: Negative for agitation.       Physical Exam     Initial Vitals [07/13/20 1638]   BP Pulse Resp Temp SpO2   136/72 69 20 98.1 °F (36.7 °C) 98 %      MAP       --         Physical Exam    Nursing note and vitals reviewed.  Constitutional: She appears well-developed and well-nourished.   HENT:   Head: Normocephalic and atraumatic.   Eyes: Conjunctivae are normal.   Neck: Normal range of motion. Neck supple.   Cardiovascular: Normal rate, regular rhythm and normal heart sounds. Exam reveals no gallop and no friction rub.    No murmur heard.  Pulmonary/Chest: Effort normal and breath sounds normal. No respiratory distress. She has no wheezes. She has no rhonchi. She has no rales.    Abdominal: Soft. She exhibits no distension. There is no abdominal tenderness.   Musculoskeletal: Normal range of motion.   Neurological: She is alert and oriented to person, place, and time. She has normal strength. GCS score is 15. GCS eye subscore is 4. GCS verbal subscore is 5. GCS motor subscore is 6.   Skin: Skin is warm and dry. No erythema.   Psychiatric: She has a normal mood and affect.         ED Course   Procedures  Labs Reviewed - No data to display       Imaging Results    None          Medical Decision Making:   ED Management:   59-year-old female with COVID presents with nausea vomiting and headache.  She has no neck stiffness or fever With bacterial meningitis unlikely.  Symptoms most likely reflect COVID infection.  She has no focal neurologic deficits with intracranial mass or hemorrhage unlikely.  She is encouraged to return for worsening headache or intractable vomiting.                                 Clinical Impression:       ICD-10-CM ICD-9-CM   1. Nonintractable episodic headache, unspecified headache type  R51 784.0   2. COVID-19  U07.1    3. Non-intractable vomiting with nausea, unspecified vomiting type  R11.2 787.01                                Swapnil Barrientos III, MD  07/13/20 2014

## 2020-07-14 NOTE — ED NOTES
Lexi Cahney presents to the ED with c/o bilateral frontal headache that started one days ago. Patient reports that she was diagnosed with covid on July 4th. Associated complaints are decreased appetite and nausea. AAOx4. Mucous membranes are pink and moist. Skin is warm, dry and intact. Lungs are clear bilaterally, respirations are regular and unlabored. Denies SOB, cough, congestion or rhinorrhea.

## 2020-07-15 ENCOUNTER — TELEPHONE (OUTPATIENT)
Dept: PAIN MEDICINE | Facility: CLINIC | Age: 60
End: 2020-07-15

## 2020-07-15 NOTE — TELEPHONE ENCOUNTER
Pt rescheduled her apt from 7/17 to 7/28. Pt informed she has to be symptom free for 3 days without any meds. Pt stated she will call and let us know.

## 2020-07-15 NOTE — TELEPHONE ENCOUNTER
----- Message from Judah Macias sent at 7/15/2020  8:58 AM CDT -----  Contact: pt  Type:  Sooner Apoointment Request    Caller is requesting a sooner appointment.      Name of Caller:  pt    Best Call Back Number:  [phone]  Additional Information:  pt needs to r/s her procedure. Please call to advise

## 2020-07-16 ENCOUNTER — TELEPHONE (OUTPATIENT)
Dept: PAIN MEDICINE | Facility: CLINIC | Age: 60
End: 2020-07-16

## 2020-07-16 NOTE — TELEPHONE ENCOUNTER
Pt called to confirm her procedure for the 28th. I advised the procedure has been rescheduled to 7/28.

## 2020-07-28 ENCOUNTER — HOSPITAL ENCOUNTER (OUTPATIENT)
Facility: AMBULARY SURGERY CENTER | Age: 60
Discharge: HOME | End: 2020-07-28
Attending: ANESTHESIOLOGY | Admitting: ANESTHESIOLOGY
Payer: COMMERCIAL

## 2020-07-28 DIAGNOSIS — M54.16 LUMBAR RADICULITIS: Primary | ICD-10-CM

## 2020-07-28 LAB — POCT GLUCOSE: 92 MG/DL (ref 70–110)

## 2020-07-28 PROCEDURE — 64483 PR EPIDURAL INJ, ANES/STEROID, TRANSFORAMINAL, LUMB/SACR, SNGL LEVL: ICD-10-PCS | Mod: LT,,, | Performed by: ANESTHESIOLOGY

## 2020-07-28 PROCEDURE — 64484 NJX AA&/STRD TFRM EPI L/S EA: CPT | Performed by: ANESTHESIOLOGY

## 2020-07-28 PROCEDURE — 64484 NJX AA&/STRD TFRM EPI L/S EA: CPT | Mod: LT,,, | Performed by: ANESTHESIOLOGY

## 2020-07-28 PROCEDURE — 64483 NJX AA&/STRD TFRM EPI L/S 1: CPT | Mod: LT,,, | Performed by: ANESTHESIOLOGY

## 2020-07-28 PROCEDURE — 64483 NJX AA&/STRD TFRM EPI L/S 1: CPT | Performed by: ANESTHESIOLOGY

## 2020-07-28 PROCEDURE — 64484 PRA INJECT ANES/STEROID FORAMEN LUMBAR/SACRAL W IMG GUIDE ,EA ADD LEVEL: ICD-10-PCS | Mod: LT,,, | Performed by: ANESTHESIOLOGY

## 2020-07-28 RX ORDER — FENTANYL CITRATE 50 UG/ML
INJECTION, SOLUTION INTRAMUSCULAR; INTRAVENOUS
Status: DISCONTINUED | OUTPATIENT
Start: 2020-07-28 | End: 2020-07-28 | Stop reason: HOSPADM

## 2020-07-28 RX ORDER — BUPIVACAINE HYDROCHLORIDE 2.5 MG/ML
INJECTION, SOLUTION EPIDURAL; INFILTRATION; INTRACAUDAL
Status: DISCONTINUED | OUTPATIENT
Start: 2020-07-28 | End: 2020-07-28 | Stop reason: HOSPADM

## 2020-07-28 RX ORDER — LIDOCAINE HYDROCHLORIDE 10 MG/ML
INJECTION, SOLUTION EPIDURAL; INFILTRATION; INTRACAUDAL; PERINEURAL
Status: DISCONTINUED | OUTPATIENT
Start: 2020-07-28 | End: 2020-07-28 | Stop reason: HOSPADM

## 2020-07-28 RX ORDER — SODIUM CHLORIDE, SODIUM LACTATE, POTASSIUM CHLORIDE, CALCIUM CHLORIDE 600; 310; 30; 20 MG/100ML; MG/100ML; MG/100ML; MG/100ML
INJECTION, SOLUTION INTRAVENOUS ONCE AS NEEDED
Status: COMPLETED | OUTPATIENT
Start: 2020-07-28 | End: 2020-07-28

## 2020-07-28 RX ORDER — DEXAMETHASONE SODIUM PHOSPHATE 10 MG/ML
INJECTION INTRAMUSCULAR; INTRAVENOUS
Status: DISCONTINUED | OUTPATIENT
Start: 2020-07-28 | End: 2020-07-28 | Stop reason: HOSPADM

## 2020-07-28 RX ORDER — MIDAZOLAM HYDROCHLORIDE 1 MG/ML
INJECTION INTRAMUSCULAR; INTRAVENOUS
Status: DISCONTINUED | OUTPATIENT
Start: 2020-07-28 | End: 2020-07-28 | Stop reason: HOSPADM

## 2020-07-28 RX ADMIN — SODIUM CHLORIDE, SODIUM LACTATE, POTASSIUM CHLORIDE, CALCIUM CHLORIDE: 600; 310; 30; 20 INJECTION, SOLUTION INTRAVENOUS at 01:07

## 2020-07-28 NOTE — H&P
CC: low back pain    HPI: The patient is a 59 y.o. female with a history of lumbar DDD here for lumbar BESSIE. There are no major changes in history and physical from 6/12/20 by Porsha Will.    Past Medical History:   Diagnosis Date    Abnormal Pap smear     Anxiety     Arthritis     Cataract     Grave's disease     Grave's disease     IBS (irritable bowel syndrome)     rare    Internal hemorrhoids     followed by Dr. Schilling    Nephrolithiasis     followed by Dr. Pope    Thyroid disease     Vitamin D deficiency        Past Surgical History:   Procedure Laterality Date    CATARACT EXTRACTION  11/19/12    right eye (toric)    CATARACT EXTRACTION W/  INTRAOCULAR LENS IMPLANT  11/26/12    left eye (toric)    EXTRACORPOREAL SHOCK WAVE LITHOTRIPSY      EYE SURGERY      eyelid surgery    LITHOTRIPSY      REATTACHMENT OF MUSCLE Right 12/27/2019    Procedure: REATTACHMENT, MUSCLE;  Surgeon: Karoline Jaimse MD;  Location: [address];  Service: Ophthalmology;  Laterality: Right;    tonsillectomy      TRANSFORAMINAL EPIDURAL INJECTION OF STEROID Left 3/24/2020    Procedure: Injection,steroid,epidural,transforaminal approach;  Surgeon: Marshall Lane MD;  Location: [facility];  Service: Pain Management;  Laterality: Left;  L4-5, L5-S1       Family History   Problem Relation Age of Onset    Hypertension Father     Stroke Father     Heart disease Mother         valve    Hypertension Mother     Heart disease Brother     Hypertension Brother     Diabetes Brother     Amblyopia Neg Hx     Blindness Neg Hx     Cancer Neg Hx     Cataracts Neg Hx     Glaucoma Neg Hx     Macular degeneration Neg Hx     Retinal detachment Neg Hx     Strabismus Neg Hx     Thyroid disease Neg Hx     Anesthesia problems Neg Hx     Clotting disorder Neg Hx        Social History     Socioeconomic History    Marital status:      Spouse name: Not on file    Number of children: 2    Years of education: Not on file     "Highest education level: Not on file   Occupational History    Occupation:      Employer: [employer]   Social Needs    Financial resource strain: Not hard at all    Food insecurity     Worry: Never true     Inability: Never true    Transportation needs     Medical: No     Non-medical: No   Tobacco Use    Smoking status: Never Smoker    Smokeless tobacco: Never Used   Substance and Sexual Activity    Alcohol use: Yes     Frequency: Monthly or less     Drinks per session: 1 or 2     Binge frequency: Never     Comment: q month    Drug use: No    Sexual activity: Yes     Partners: Male     Birth control/protection: Post-menopausal   Lifestyle    Physical activity     Days per week: 4 days     Minutes per session: 30 min    Stress: Very much   Relationships    Social connections     Talks on phone: More than three times a week     Gets together: Three times a week     Attends Mosque service: Not on file     Active member of club or organization: Yes     Attends meetings of clubs or organizations: More than 4 times per year     Relationship status:    Other Topics Concern    Not on file   Social History Narrative    Not on file       Current Facility-Administered Medications   Medication Dose Route Frequency Provider Last Rate Last Dose    lactated ringers infusion   Intravenous Once PRN Marshall Lane MD           Review of patient's allergies indicates:   Allergen Reactions    Sulfa (sulfonamide antibiotics) Itching and Other (See Comments)     Hyperventilation       Vitals:    06/30/20 0719   Weight: 74.4 kg (164 lb)   Height: 5' 4" (1.626 m)       REVIEW OF SYSTEMS:     GENERAL: No weight loss, malaise or fevers.  HEENT:  No recent changes in vision or hearing  NECK: Negative for lumps, no difficulty with swallowing.  RESPIRATORY: Negative for cough, wheezing or shortness of breath, patient denies any recent URI.  CARDIOVASCULAR: Negative for chest pain, leg swelling or " palpitations.  GI: Negative for abdominal discomfort, blood in stools or black stools or change in bowel habits.  MUSCULOSKELETAL: See HPI.  SKIN: Negative for lesions, rash, and itching.  PSYCH: No suicidal or homicidal ideations, no current mood disturbances.  HEMATOLOGY/LYMPHOLOGY: Negative for prolonged bleeding, bruising easily or swollen nodes. Patient is not currently taking any anti-coagulants  ENDO: No history of diabetes or thyroid dysfunction  NEURO: No history of syncope, paralysis, seizures or tremors.All other reviewed and negative other than HPI.    Physical exam:  Gen: A and O x3, pleasant, well-groomed  Skin: No rashes or obvious lesions  HEENT: PERRLA, no obvious deformities on ears or in canals. No thyroid masses, trachea midline, no palpable lymph nodes in neck, axilla.  CVS: Regular rate and rhythm, normal S1 and S2, no murmurs.  Resp: Clear to auscultation bilaterally.  Abdomen: Soft, NT/ND, normal bowel sounds present.  Musculoskeletal/Neuro: Moving all extremities    Assessment:  Lumbar radiculitis          PLAN: LUmbar BESSIE      This patient has been cleared for surgery in an ambulatory surgical facility    ASA 3,  Mallampatti Score 3  No history of anesthetic complications  Plan for RN IV sedation

## 2020-07-28 NOTE — DISCHARGE SUMMARY
[facility]  Discharge Note  Short Stay    Admit Date: 7/28/2020    Discharge Date and Time: 7/28/2020    Attending Physician: Marshall Lane MD     Discharge Provider: Marshall Lane    Diagnoses:  Active Hospital Problems    Diagnosis  POA    *Lumbar radiculitis [M54.16]  Yes      Resolved Hospital Problems   No resolved problems to display.       Hospital Course: Lumbar BESSIE  Discharged Condition: Good    Final Diagnoses:   Active Hospital Problems    Diagnosis  POA    *Lumbar radiculitis [M54.16]  Yes      Resolved Hospital Problems   No resolved problems to display.       Disposition: Home or Self Care    Follow up/Patient Instructions:    Medications:  Reconciled Home Medications:      Medication List      CONTINUE taking these medications    ALPRAZolam 0.5 MG tablet  Commonly known as: XANAX  Take 1 tablet (0.5 mg total) by mouth nightly as needed.     BenadryL 25 mg capsule  Generic drug: diphenhydrAMINE  Take 25 mg by mouth nightly as needed for Allergies or Insomnia.     butalbital-acetaminophen-caffeine -40 mg -40 mg per tablet  Commonly known as: FIORICET, ESGIC  Take 1 tablet by mouth every 4 (four) hours as needed for Headaches.     cholecalciferol (vitamin D3) 125 mcg (5,000 unit) Tab  Take 5,000 Units by mouth every other day.     dicyclomine 20 mg tablet  Commonly known as: BENTYL  Take 1 tablet (20 mg total) by mouth every 8 (eight) hours as needed.     HYDROcodone-acetaminophen 5-325 mg per tablet  Commonly known as: NORCO  Take 1 tablet by mouth every 6 (six) hours as needed for Pain.     liothyronine 5 MCG Tab  Commonly known as: CYTOMEL  TAKE 1 TABLET BY MOUTH EVERY DAY     metoprolol succinate 25 MG 24 hr tablet  Commonly known as: TOPROL-XL  TAKE 1/2 TABLET BY MOUTH ONCE DAILY     ondansetron 8 MG tablet  Commonly known as: ZOFRAN  Take 1 tablet (8 mg total) by mouth every 12 (twelve) hours as needed for Nausea.     SYNTHROID 125 MCG tablet  Generic drug: levothyroxine  TAKE 1  TABLET BY MOUTH EVERY DAY FOR 6 DAYS A WEEK AND 1/2 TABLET ON DAY 7          Discharge Procedure Orders   Call MD for:  temperature >100.4     Call MD for:  persistent nausea and vomiting or diarrhea     Call MD for:  severe uncontrolled pain     Call MD for:  redness, tenderness, or signs of infection (pain, swelling, redness, odor or green/yellow discharge around incision site)     Call MD for:  difficulty breathing or increased cough     Call MD for:  severe persistent headache        Follow up with MD in 2-3 weeks    Discharge Procedure Orders (must include Diet, Follow-up, Activity):   Discharge Procedure Orders (must include Diet, Follow-up, Activity)   Call MD for:  temperature >100.4     Call MD for:  persistent nausea and vomiting or diarrhea     Call MD for:  severe uncontrolled pain     Call MD for:  redness, tenderness, or signs of infection (pain, swelling, redness, odor or green/yellow discharge around incision site)     Call MD for:  difficulty breathing or increased cough     Call MD for:  severe persistent headache

## 2020-07-28 NOTE — DISCHARGE INSTRUCTIONS
Before leaving, please make sure you have all your personal belongings such as glasses, purses, wallets, keys, cell phones, jewelry, jackets etc     Pain injection instructions:     This procedure may take a couple weeks to relieve pain  You may get some pain relief from the local anesthetic initally.    No driving for 24 hrs.   Activity as tolerated- gradually increase activities.  Dont lift over 10 lbs for 24 hrs   No heat at injection sites for 2 full days. No heating pads, hot tubs, saunas, or swimming in any body of water or pool for 2 full days.  Use ice pack for mild swelling and for comfort , apply for 20 minutes, remove for 20 minute intervals. No direct contact of ice itself  to skin.  May shower today.  Do not allow shower water to beat on injections site(s) for 2 full days. No tub baths for two full days.      Resume Aspirin, Plavix, or Coumadin the day after the procedure unless otherwise instructed.   If diabetic,monitor your glucose carefully as steroids can increase your glucose level    Seek immediate medical help for:   Severe increase in your usual pain or appearance of new pain.  Prolonged (more than 8 hours) or increasing weakness or numbness in the legs or arms. Numbing medicine was injected and can affect the messages to and from the brain and legs or arms.  .    Fever above 100.4 degrees F ,Drainage,redness,active bleeding, or increased swelling at the injection site.  Headache, shortness of breath, chest pain, or breathing problems.    After Surgery:  Always be aware that any surgery can cause these symptoms:    Pain- Medication can be prescribed for pain to decrease your pain but may not completely take your pain away. Over the Counter pain medicine my be enough and you can always use Ice and rest to help ease pain.    Bleeding- a little bleeding after a surgery is usually within normal.  If there is a lot of blood you need to notify your MD.  Emergency treatments of bleeding are cold  application, elevation of the bleeding site and compression.    Infection- Infection after surgery is NOT a normal occurrence.  Signs of infection are fever, swelling, hot to touch the incision.  If this occurs notify your MD immediately.    Nausea- this can be common after a surgery especially if you have had anesthesia medicine or are taking pain medicine.  Steroids have a side effect of nausea sometimes. Staying on clear liquids, bland foods, gingerale, or over the counter anti nausea medicines can help.  If you vomit more than once, notify your MD.  Anti Nausea medicines can be prescribed.

## 2020-07-29 VITALS
DIASTOLIC BLOOD PRESSURE: 72 MMHG | WEIGHT: 164 LBS | BODY MASS INDEX: 28 KG/M2 | OXYGEN SATURATION: 96 % | SYSTOLIC BLOOD PRESSURE: 108 MMHG | HEIGHT: 64 IN | HEART RATE: 66 BPM | TEMPERATURE: 99 F | RESPIRATION RATE: 18 BRPM

## 2020-08-27 ENCOUNTER — OFFICE VISIT (OUTPATIENT)
Dept: PAIN MEDICINE | Facility: CLINIC | Age: 60
End: 2020-08-27
Payer: COMMERCIAL

## 2020-08-27 VITALS
HEIGHT: 64 IN | WEIGHT: 160 LBS | SYSTOLIC BLOOD PRESSURE: 105 MMHG | HEART RATE: 75 BPM | DIASTOLIC BLOOD PRESSURE: 64 MMHG | BODY MASS INDEX: 27.31 KG/M2

## 2020-08-27 DIAGNOSIS — M47.896 OTHER SPONDYLOSIS, LUMBAR REGION: ICD-10-CM

## 2020-08-27 DIAGNOSIS — M51.36 DDD (DEGENERATIVE DISC DISEASE), LUMBAR: ICD-10-CM

## 2020-08-27 DIAGNOSIS — M54.16 RADICULOPATHY, LUMBAR REGION: Primary | ICD-10-CM

## 2020-08-27 PROCEDURE — 99999 PR PBB SHADOW E&M-EST. PATIENT-LVL III: CPT | Mod: PBBFAC,,, | Performed by: PHYSICIAN ASSISTANT

## 2020-08-27 PROCEDURE — 99214 OFFICE O/P EST MOD 30 MIN: CPT | Mod: S$GLB,,, | Performed by: PHYSICIAN ASSISTANT

## 2020-08-27 PROCEDURE — 3008F BODY MASS INDEX DOCD: CPT | Mod: CPTII,S$GLB,, | Performed by: PHYSICIAN ASSISTANT

## 2020-08-27 PROCEDURE — 99214 PR OFFICE/OUTPT VISIT, EST, LEVL IV, 30-39 MIN: ICD-10-PCS | Mod: S$GLB,,, | Performed by: PHYSICIAN ASSISTANT

## 2020-08-27 PROCEDURE — 99999 PR PBB SHADOW E&M-EST. PATIENT-LVL III: ICD-10-PCS | Mod: PBBFAC,,, | Performed by: PHYSICIAN ASSISTANT

## 2020-08-27 PROCEDURE — 3008F PR BODY MASS INDEX (BMI) DOCUMENTED: ICD-10-PCS | Mod: CPTII,S$GLB,, | Performed by: PHYSICIAN ASSISTANT

## 2020-08-27 NOTE — PROGRESS NOTES
Referring Physician: No ref. provider found    PCP: Danny Szymanski MD      CC:  Low back and left leg pain    Interval History:  Lexi Chaney is a 60 y.o. female with low back pain who presents today to discuss treatment options. She is s/p left TF BESSIE at L4-5 and L5-S1. Reports she had >95% relief. She continues to benefit but pain is returning. She was very happy with results. She tried 1 visit of PT that increased pain.  Denies any radiation into LE. Does c/o some left buttock pain. Denies b/b changes. Pain today is rated 0/10.       HPI:   Lexi Chaney is a 60 y.o. female who presents with low back and left leg pain.  Pain has worsened over past 3 months.  No recent traumatic incident.  She has constant aching, sharp type pain in her low back and left buttock.  Pain radiates down her left posterior thigh.  Pain worsens sitting, lying, lifting.  Pain improves with standing, walking activity.  She recently had an updated lumbar MRI.  She has been seen by Physical Medicine rehab.  She has tried physical therapy with minimal benefit.  She was referred to us for direct procedure wishes to talk to us specifically about upcoming procedure.  She denies any worsening weakness.  No bowel bladder changes.    ROS:  CONSTITUTIONAL: No fevers, chills, night sweats, wt. loss, appetite changes  SKIN: no rashes or itching  ENT: No headaches, head trauma, vision changes, or eye pain  LYMPH NODES: None noticed   CV: No chest pain, palpitations.   RESP: No shortness of breath, dyspnea on exertion, cough, wheezing, or hemoptysis  GI: No nausea, emesis, diarrhea, constipation, melena, hematochezia, pain.    : No dysuria, hematuria, urgency, or frequency   HEME: No easy bruising, bleeding problems  PSYCHIATRIC: No depression, anxiety, psychosis, hallucinations.  NEURO: No seizures, memory loss, dizziness or difficulty sleeping  MSK:  Positive HPI      Past Medical History:   Diagnosis Date    Abnormal Pap smear      Anxiety     Arthritis     Cataract     Grave's disease     Grave's disease     IBS (irritable bowel syndrome)     rare    Internal hemorrhoids     followed by Dr. Schilling    Nephrolithiasis     followed by Dr. Pope    Thyroid disease     Vitamin D deficiency      Past Surgical History:   Procedure Laterality Date    CATARACT EXTRACTION  11/19/12    right eye (toric)    CATARACT EXTRACTION W/  INTRAOCULAR LENS IMPLANT  11/26/12    left eye (toric)    EXTRACORPOREAL SHOCK WAVE LITHOTRIPSY      EYE SURGERY      eyelid surgery    LITHOTRIPSY      REATTACHMENT OF MUSCLE Right 12/27/2019    Procedure: REATTACHMENT, MUSCLE;  Surgeon: Karoline Jaimes MD;  Location: [address];  Service: Ophthalmology;  Laterality: Right;    tonsillectomy      TRANSFORAMINAL EPIDURAL INJECTION OF STEROID Left 3/24/2020    Procedure: Injection,steroid,epidural,transforaminal approach;  Surgeon: Marshall Lane MD;  Location: [facility];  Service: Pain Management;  Laterality: Left;  L4-5, L5-S1    TRANSFORAMINAL EPIDURAL INJECTION OF STEROID Left 7/28/2020    Procedure: INJECTION, STEROID, EPIDURAL, TRANSFORAMINAL APPROACH;  Surgeon: Marshall Lane MD;  Location: [facility];  Service: Pain Management;  Laterality: Left;  L4-5, L5-S1  leave last on the schedule.  will not need to retest for covid.  Verify no fever/off antiinflammatories and no covid symptoms.     Family History   Problem Relation Age of Onset    Hypertension Father     Stroke Father     Heart disease Mother         valve    Hypertension Mother     Heart disease Brother     Hypertension Brother     Diabetes Brother     Amblyopia Neg Hx     Blindness Neg Hx     Cancer Neg Hx     Cataracts Neg Hx     Glaucoma Neg Hx     Macular degeneration Neg Hx     Retinal detachment Neg Hx     Strabismus Neg Hx     Thyroid disease Neg Hx     Anesthesia problems Neg Hx     Clotting disorder Neg Hx      Social History     Socioeconomic History    Marital status:  "     Spouse name: Not on file    Number of children: 2    Years of education: Not on file    Highest education level: Not on file   Occupational History    Occupation:      Employer: [employer]   Social Needs    Financial resource strain: Not hard at all    Food insecurity     Worry: Never true     Inability: Never true    Transportation needs     Medical: No     Non-medical: No   Tobacco Use    Smoking status: Never Smoker    Smokeless tobacco: Never Used   Substance and Sexual Activity    Alcohol use: Yes     Frequency: Monthly or less     Drinks per session: 1 or 2     Binge frequency: Never     Comment: q month    Drug use: No    Sexual activity: Yes     Partners: Male     Birth control/protection: Post-menopausal   Lifestyle    Physical activity     Days per week: 4 days     Minutes per session: 30 min    Stress: Very much   Relationships    Social connections     Talks on phone: More than three times a week     Gets together: Three times a week     Attends [employer] service: Not on file     Active member of club or organization: Yes     Attends meetings of clubs or organizations: More than 4 times per year     Relationship status:    Other Topics Concern    Not on file   Social History Narrative    Not on file         Medications/Allergies: See med card    Vitals:    08/27/20 1321   BP: 105/64   Pulse: 75   Weight: 72.6 kg (160 lb)   Height: 5' 4" (1.626 m)   PainSc: 0-No pain   PainLoc: Back         Physical exam:    GENERAL: A and O x3, the patient appears well groomed and is in no acute distress.  Skin: No rashes or obvious lesions  HEENT: normocephalic, atraumatic  CARDIOVASCULAR:  RRR  LUNGS: non labored breathing  ABDOMEN: soft, nontender   UPPER EXTREMITIES: Normal alignment, normal range of motion, no atrophy, no skin changes,  hair growth and nail growth normal and equal bilaterally. No swelling, no tenderness.    LOWER EXTREMITIES:  Normal " alignment, normal range of motion, no atrophy, no skin changes,  hair growth and nail growth normal and equal bilaterally. No swelling, no tenderness.    LUMBAR SPINE  Lumbar spine: ROM is full with flexion extension and oblique extension with no increased pain.    Kash's test causes no increased pain on either side.    Supine straight leg raise is negative bilaterally  Internal and external rotation of the hip causes no increased pain on either side.  Myofascial exam: No tenderness to palpation across lumbar paraspinous muscles.      MENTAL STATUS: normal orientation, speech, language, and fund of knowledge for social situation.  Emotional state appropriate.    CRANIAL NERVES:  II:  PERRL bilaterally,   III,IV,VI: EOMI.    V:  Facial sensation equal bilaterally  VII:  Facial motor function normal.  VIII:  Hearing equal to finger rub bilaterally  IX/X: Gag normal, palate symmetric  XI:  Shoulder shrug equal, head turn equal  XII:  Tongue midline without fasciculations      MOTOR: Tone and bulk: normal bilateral upper and lower Strength: normal   Delt Bi Tri WE WF     R 5 5 5 5 5 5   L 5 5 5 5 5 5     IP ADD ABD Quad TA Gas HAM  R 5 5 5 5 5 5 5  L 5 5 5 5 5 5 5    SENSATION: Light touch and pinprick intact bilaterally  REFLEXES: normal, symmetric, nonbrisk.  Toes down, no clonus. No hoffmans.  GAIT: normal rise, base, steps, and arm swing.      Imaging:  MRI L-spine 3/2020  L3-4: There is a minimal disc bulge and mild facet joint arthropathy.  There is no spinal canal or significant foraminal stenosis.    L4-5: There is trace anterolisthesis of L4 on L5.  There is a diffuse disc bulge with osteophytic ridging eccentric to the left with very subtle left posterolateral annular fissure.  There is moderate-to-marked facet joint arthropathy.  There is left periarticular edema.  There is a 5 x 6 mm left-sided synovial cysts which contacts the left L5 root and results in crowding of the left lateral recess.  There is  a trace left facet joint effusion.  There is no spinal stenosis.  There is no significant foraminal stenosis.    L5-S1: There is facet joint arthropathy.  There is a broad disc bulge with central left paracentral dorsal annular fissure.  There is no spinal canal or foraminal stenosis.  The diffuse disc bulge however does contact the left S1 root.    Assessment:  Lexi Chaney is a 60 y.o. female with   1. Radiculopathy, lumbar region    2. DDD (degenerative disc disease), lumbar    3. Other spondylosis, lumbar region        Plan:  1. I have stressed the importance of physical activity and exercise to improve overall health  2. Monitor progress and consider repeat lumbar epidural steroid injection to the Left L4-5 and L5-S1 level(s). Will call if pain worsens  3. Consider lumbar MBB workup in the future if low back pain worsens  4. F/u prn

## 2020-10-05 ENCOUNTER — PATIENT OUTREACH (OUTPATIENT)
Dept: ADMINISTRATIVE | Facility: OTHER | Age: 60
End: 2020-10-05

## 2020-10-05 DIAGNOSIS — M25.512 LEFT SHOULDER PAIN, UNSPECIFIED CHRONICITY: Primary | ICD-10-CM

## 2020-10-05 NOTE — PROGRESS NOTES
Chart was reviewed for overdue Proactive [facility] Encounters (RUBIN)  topics  Updates were requested from care everywhere  Health Maintenance has been updated  [place] immunization registry triggered

## 2020-10-06 ENCOUNTER — HOSPITAL ENCOUNTER (OUTPATIENT)
Dept: RADIOLOGY | Facility: HOSPITAL | Age: 60
Discharge: HOME | End: 2020-10-06
Attending: ORTHOPAEDIC SURGERY
Payer: COMMERCIAL

## 2020-10-06 ENCOUNTER — OFFICE VISIT (OUTPATIENT)
Dept: ORTHOPEDICS | Facility: CLINIC | Age: 60
End: 2020-10-06
Payer: COMMERCIAL

## 2020-10-06 VITALS — RESPIRATION RATE: 15 BRPM | BODY MASS INDEX: 27.33 KG/M2 | HEIGHT: 64 IN | WEIGHT: 160.06 LBS

## 2020-10-06 DIAGNOSIS — M25.512 LEFT SHOULDER PAIN, UNSPECIFIED CHRONICITY: Primary | ICD-10-CM

## 2020-10-06 DIAGNOSIS — M25.512 LEFT SHOULDER PAIN, UNSPECIFIED CHRONICITY: ICD-10-CM

## 2020-10-06 PROCEDURE — 99999 PR PBB SHADOW E&M-EST. PATIENT-LVL III: CPT | Mod: PBBFAC,,, | Performed by: ORTHOPAEDIC SURGERY

## 2020-10-06 PROCEDURE — 99999 PR PBB SHADOW E&M-EST. PATIENT-LVL III: ICD-10-PCS | Mod: PBBFAC,,, | Performed by: ORTHOPAEDIC SURGERY

## 2020-10-06 PROCEDURE — 20610 LARGE JOINT ASPIRATION/INJECTION: L SUBACROMIAL BURSA: ICD-10-PCS | Mod: LT,S$GLB,, | Performed by: ORTHOPAEDIC SURGERY

## 2020-10-06 PROCEDURE — 73030 X-RAY EXAM OF SHOULDER: CPT | Mod: 26,LT,, | Performed by: RADIOLOGY

## 2020-10-06 PROCEDURE — 73030 X-RAY EXAM OF SHOULDER: CPT | Mod: TC,PN,LT

## 2020-10-06 PROCEDURE — 20610 DRAIN/INJ JOINT/BURSA W/O US: CPT | Mod: LT,S$GLB,, | Performed by: ORTHOPAEDIC SURGERY

## 2020-10-06 PROCEDURE — 3008F PR BODY MASS INDEX (BMI) DOCUMENTED: ICD-10-PCS | Mod: CPTII,S$GLB,, | Performed by: ORTHOPAEDIC SURGERY

## 2020-10-06 PROCEDURE — 73030 XR SHOULDER TRAUMA 3 VIEW LEFT: ICD-10-PCS | Mod: 26,LT,, | Performed by: RADIOLOGY

## 2020-10-06 PROCEDURE — 3008F BODY MASS INDEX DOCD: CPT | Mod: CPTII,S$GLB,, | Performed by: ORTHOPAEDIC SURGERY

## 2020-10-06 PROCEDURE — 99213 PR OFFICE/OUTPT VISIT, EST, LEVL III, 20-29 MIN: ICD-10-PCS | Mod: 25,S$GLB,, | Performed by: ORTHOPAEDIC SURGERY

## 2020-10-06 PROCEDURE — 99213 OFFICE O/P EST LOW 20 MIN: CPT | Mod: 25,S$GLB,, | Performed by: ORTHOPAEDIC SURGERY

## 2020-10-06 RX ORDER — TRIAMCINOLONE ACETONIDE 40 MG/ML
40 INJECTION, SUSPENSION INTRA-ARTICULAR; INTRAMUSCULAR
Status: DISCONTINUED | OUTPATIENT
Start: 2020-10-06 | End: 2020-10-06 | Stop reason: HOSPADM

## 2020-10-06 RX ADMIN — TRIAMCINOLONE ACETONIDE 40 MG: 40 INJECTION, SUSPENSION INTRA-ARTICULAR; INTRAMUSCULAR at 03:10

## 2020-10-06 NOTE — PROGRESS NOTES
Past Medical History:   Diagnosis Date    Abnormal Pap smear     Anxiety     Arthritis     Cataract     Grave's disease     Grave's disease     IBS (irritable bowel syndrome)     rare    Internal hemorrhoids     followed by Dr. Schilling    Nephrolithiasis     followed by Dr. Pope    Thyroid disease     Vitamin D deficiency        Past Surgical History:   Procedure Laterality Date    CATARACT EXTRACTION  11/19/12    right eye (toric)    CATARACT EXTRACTION W/  INTRAOCULAR LENS IMPLANT  11/26/12    left eye (toric)    EXTRACORPOREAL SHOCK WAVE LITHOTRIPSY      EYE SURGERY      eyelid surgery    LITHOTRIPSY      REATTACHMENT OF MUSCLE Right 12/27/2019    Procedure: REATTACHMENT, MUSCLE;  Surgeon: Karoline Jaimes MD;  Location: [address];  Service: Ophthalmology;  Laterality: Right;    tonsillectomy      TRANSFORAMINAL EPIDURAL INJECTION OF STEROID Left 3/24/2020    Procedure: Injection,steroid,epidural,transforaminal approach;  Surgeon: Marshall Lane MD;  [facility];  Service: Pain Management;  Laterality: Left;  L4-5, L5-S1    TRANSFORAMINAL EPIDURAL INJECTION OF STEROID Left 7/28/2020    Procedure: INJECTION, STEROID, EPIDURAL, TRANSFORAMINAL APPROACH;  Surgeon: Marshall Lane MD;  Location[facility];  Service: Pain Management;  Laterality: Left;  L4-5, L5-S1  leave last on the schedule.  will not need to retest for covid.  Verify no fever/off antiinflammatories and no covid symptoms.       Current Outpatient Medications   Medication Sig    ALPRAZolam (XANAX) 0.5 MG tablet Take 1 tablet (0.5 mg total) by mouth nightly as needed.    cholecalciferol, vitamin D3, 5,000 unit Tab Take 5,000 Units by mouth every other day.    dicyclomine (BENTYL) 20 mg tablet Take 1 tablet (20 mg total) by mouth every 8 (eight) hours as needed.    diphenhydrAMINE (BENADRYL) 25 mg capsule Take 25 mg by mouth nightly as needed for Allergies or Insomnia.    HYDROcodone-acetaminophen (NORCO) 5-325 mg per tablet  Take 1 tablet by mouth every 6 (six) hours as needed for Pain. (Patient not taking: Reported on 10/6/2020)    liothyronine (CYTOMEL) 5 MCG Tab TAKE 1 TABLET BY MOUTH EVERY DAY    metoprolol succinate (TOPROL-XL) 25 MG 24 hr tablet TAKE 1/2 TABLET BY MOUTH ONCE DAILY    ondansetron (ZOFRAN) 8 MG tablet Take 1 tablet (8 mg total) by mouth every 12 (twelve) hours as needed for Nausea.    SYNTHROID 125 mcg tablet TAKE 1 TABLET BY MOUTH EVERY DAY FOR 6 DAYS A WEEK AND 1/2 TABLET ON DAY 7     No current facility-administered medications for this visit.        Review of patient's allergies indicates:   Allergen Reactions    Codeine Nausea And Vomiting    Sulfa (sulfonamide antibiotics) Itching       Family History   Problem Relation Age of Onset    Hypertension Father     Stroke Father     Heart disease Mother         valve    Hypertension Mother     Heart disease Brother     Hypertension Brother     Diabetes Brother     Amblyopia Neg Hx     Blindness Neg Hx     Cancer Neg Hx     Cataracts Neg Hx     Glaucoma Neg Hx     Macular degeneration Neg Hx     Retinal detachment Neg Hx     Strabismus Neg Hx     Thyroid disease Neg Hx     Anesthesia problems Neg Hx     Clotting disorder Neg Hx        Social History     Socioeconomic History    Marital status:      Spouse name: Not on file    Number of children: 2    Years of education: Not on file    Highest education level: Not on file   Occupational History    Occupation:      Employer: [employer]   Social Needs    Financial resource strain: Not hard at all    Food insecurity     Worry: Never true     Inability: Never true    Transportation needs     Medical: No     Non-medical: No   Tobacco Use    Smoking status: Never Smoker    Smokeless tobacco: Never Used   Substance and Sexual Activity    Alcohol use: Yes     Frequency: Monthly or less     Drinks per session: 1 or 2     Binge frequency: Never     Comment: taco  "month    Drug use: No    Sexual activity: Yes     Partners: Male     Birth control/protection: Post-menopausal   Lifestyle    Physical activity     Days per week: 4 days     Minutes per session: 30 min    Stress: Very much   Relationships    Social connections     Talks on phone: More than three times a week     Gets together: Three times a week     Attends Tenriism service: Not on file     Active member of club or organization: Yes     Attends meetings of clubs or organizations: More than 4 times per year     Relationship status:    Other Topics Concern    Not on file   Social History Narrative    Not on file       Chief Complaint:   Chief Complaint   Patient presents with    Left Shoulder - Pain       Consulting Physician: No ref. provider found    History of present illness:    This is a 60 y.o. female who complains of left shoulder pain.  She thinks this is from overuse.  Pain 6/10 and worse with use. Decreased ROM. No injury.  The previous injection we gave her work for well over a year.    Review of Systems:    Constitution: Denies chills, fever, and sweats.  HENT: Denies headaches or blurry vision.  Cardiovascular: Denies chest pain or irregular heart beat.  Respiratory: Denies cough or shortness of breath.  Gastrointestinal: Denies abdominal pain, nausea, or vomiting.  Musculoskeletal:  Denies muscle cramps.  Neurological: Denies dizziness or focal weakness.  Psychiatric/Behavioral: Normal mental status.  Hematologic/Lymphatic: Denies bleeding problem or easy bruising/bleeding.  Skin: Denies rash or suspicious lesions.    Examination:    Vital Signs:    Vitals:    10/06/20 1552   Resp: 15   Weight: 72.6 kg (160 lb 0.9 oz)   Height: 5' 4" (1.626 m)   PainSc:   6   PainLoc: Shoulder       Body mass index is 27.47 kg/m².    This a well-developed, well nourished patient in no acute distress.    Alert and oriented x 3 and cooperative to examination.       Physical Exam:     Left Shoulder Exam "     Skin  Rash:   None  Scars:   None    Inspection/Observation   Swelling:   none  Erythema:   none  Bruising:   none  Wounds:   none  Scapular Winging:  none  Scapular Dyskinesia:  mild  Atrophy:   none  Masses:   None  Lymphadenopathy: None    Tenderness   AC Joint:   Mild  Bicep groove:  Mild    Range of Motion   Active Forward Flexion:  120   Active External Rotation:  30  Active Internal Rotation:  Low Back    Muscle Strength   Forward Flexion:  5/5  External Rotation:  5/5  Internal Rotation:  5/5    Tests & Signs   Cross Arm:   negative  Hawkin's test:   positive  Impingement:   positive    Other   Sensation:  normal  Pulse:    2+ radial            Imaging:  X-rays ordered and reviewed today of the left shoulder show no acute bony abnormality.     Assessment: Left shoulder pain, unspecified chronicity  -     Large Joint Aspiration/Injection: L subacromial bursa        Plan:  She once again has some bursitis in her left shoulder so we will inject. Patient elected for physician provided exercise program which was given to them today along with instruction.  She can follow up as needed.        DISCLAIMER: This note may have been dictated using voice recognition software and may contain grammatical errors.     NOTE: Consult report sent to referring provider via [employer].

## 2020-10-06 NOTE — PROCEDURES
Large Joint Aspiration/Injection: L subacromial bursa    Date/Time: 10/6/2020 3:45 PM  Performed by: Danny Harmon MD  Authorized by: Danny Harmon MD     Consent Done?:  Yes (Verbal)  Indications:  Pain  Timeout: prior to procedure the correct patient, procedure, and site was verified    Approach:  Lateral  Location:  Shoulder  Site:  L subacromial bursa  Medications:  40 mg triamcinolone acetonide 40 mg/mL

## 2020-10-23 ENCOUNTER — OFFICE VISIT (OUTPATIENT)
Dept: FAMILY MEDICINE | Facility: CLINIC | Age: 60
End: 2020-10-23
Payer: COMMERCIAL

## 2020-10-23 VITALS
OXYGEN SATURATION: 98 % | DIASTOLIC BLOOD PRESSURE: 70 MMHG | SYSTOLIC BLOOD PRESSURE: 120 MMHG | HEART RATE: 71 BPM | TEMPERATURE: 98 F | BODY MASS INDEX: 27.92 KG/M2 | HEIGHT: 64 IN | WEIGHT: 163.56 LBS

## 2020-10-23 DIAGNOSIS — F41.9 ANXIETY: ICD-10-CM

## 2020-10-23 DIAGNOSIS — U07.1 COVID-19 VIRUS INFECTION: ICD-10-CM

## 2020-10-23 DIAGNOSIS — F51.01 PRIMARY INSOMNIA: ICD-10-CM

## 2020-10-23 DIAGNOSIS — Z00.00 PREVENTATIVE HEALTH CARE: Primary | ICD-10-CM

## 2020-10-23 PROCEDURE — 99396 PR PREVENTIVE VISIT,EST,40-64: ICD-10-PCS | Mod: S$GLB,,, | Performed by: FAMILY MEDICINE

## 2020-10-23 PROCEDURE — 3008F PR BODY MASS INDEX (BMI) DOCUMENTED: ICD-10-PCS | Mod: CPTII,S$GLB,, | Performed by: FAMILY MEDICINE

## 2020-10-23 PROCEDURE — 3008F BODY MASS INDEX DOCD: CPT | Mod: CPTII,S$GLB,, | Performed by: FAMILY MEDICINE

## 2020-10-23 PROCEDURE — 99999 PR PBB SHADOW E&M-EST. PATIENT-LVL III: CPT | Mod: PBBFAC,,, | Performed by: FAMILY MEDICINE

## 2020-10-23 PROCEDURE — 99999 PR PBB SHADOW E&M-EST. PATIENT-LVL III: ICD-10-PCS | Mod: PBBFAC,,, | Performed by: FAMILY MEDICINE

## 2020-10-23 PROCEDURE — 99396 PREV VISIT EST AGE 40-64: CPT | Mod: S$GLB,,, | Performed by: FAMILY MEDICINE

## 2020-10-23 RX ORDER — ALPRAZOLAM 0.5 MG/1
0.5 TABLET ORAL NIGHTLY PRN
Qty: 30 TABLET | Refills: 0 | Status: SHIPPED | OUTPATIENT
Start: 2020-10-23 | End: 2021-08-12 | Stop reason: SDUPTHER

## 2020-10-23 NOTE — PROGRESS NOTES
Subjective:       Patient ID: Lexi Chaney is a 60 y.o. female.    Chief Complaint: Annual Exam    Here for a general exam.    She is overall feeling well.    She has recovered from Covid.    Graves disease, hypothyroidsm - s/o JOHNS; taking Cytomel and synthroid; following with endocrinology  Palpitations - taking Toprol XL 25mg 1/2 daily  Anxiety - some goes day and bad days; some anxiety at bedtime; using benadryl at night; using Xanax 0.5mg 1/2 tab PRN insomnia or panic    Using benadryl for sinus pressure and insomnia          Past Medical History:   Diagnosis Date    Abnormal Pap smear     Anxiety     Arthritis     Cataract     Grave's disease     Grave's disease     IBS (irritable bowel syndrome)     rare    Internal hemorrhoids     followed by Dr. Schilling    Nephrolithiasis     followed by Dr. Pope    Thyroid disease     Vitamin D deficiency        Past Surgical History:   Procedure Laterality Date    CATARACT EXTRACTION  11/19/12    right eye (toric)    CATARACT EXTRACTION W/  INTRAOCULAR LENS IMPLANT  11/26/12    left eye (toric)    EXTRACORPOREAL SHOCK WAVE LITHOTRIPSY      EYE SURGERY      eyelid surgery    LITHOTRIPSY      REATTACHMENT OF MUSCLE Right 12/27/2019    Procedure: REATTACHMENT, MUSCLE;  Surgeon: Karoline Jaimes MD;  Location: [address];  Service: Ophthalmology;  Laterality: Right;    tonsillectomy      TRANSFORAMINAL EPIDURAL INJECTION OF STEROID Left 3/24/2020    Procedure: Injection,steroid,epidural,transforaminal approach;  Surgeon: Marshall Lane MD;  Location: [facility];  Service: Pain Management;  Laterality: Left;  L4-5, L5-S1    TRANSFORAMINAL EPIDURAL INJECTION OF STEROID Left 7/28/2020    Procedure: INJECTION, STEROID, EPIDURAL, TRANSFORAMINAL APPROACH;  Surgeon: Marshall Lane MD;  Location: [facility];  Service: Pain Management;  Laterality: Left;  L4-5, L5-S1  leave last on the schedule.  will not need to retest for covid.  Verify no fever/off antiinflammatories  and no covid symptoms.       Review of patient's allergies indicates:   Allergen Reactions    Sulfa (sulfonamide antibiotics) Itching and Other (See Comments)     Hyperventilation       Social History     Socioeconomic History    Marital status:      Spouse name: Not on file    Number of children: 2    Years of education: Not on file    Highest education level: Not on file   Occupational History    Occupation: [employer]     Employer: [employer]   Social Needs    Financial resource strain: Not hard at all    Food insecurity     Worry: Never true     Inability: Never true    Transportation needs     Medical: No     Non-medical: No   Tobacco Use    Smoking status: Never Smoker    Smokeless tobacco: Never Used   Substance and Sexual Activity    Alcohol use: Yes     Frequency: Monthly or less     Drinks per session: 1 or 2     Binge frequency: Never     Comment: q month    Drug use: No    Sexual activity: Yes     Partners: Male     Birth control/protection: Post-menopausal   Lifestyle    Physical activity     Days per week: 4 days     Minutes per session: 30 min    Stress: Very much   Relationships    Social connections     Talks on phone: More than three times a week     Gets together: Three times a week     Attends Scientology service: Not on file     Active member of club or organization: Yes     Attends meetings of clubs or organizations: More than 4 times per year     Relationship status:    Other Topics Concern    Not on file   Social History Narrative    Not on file       Current Outpatient Medications on File Prior to Visit   Medication Sig Dispense Refill    cholecalciferol, vitamin D3, 5,000 unit Tab Take 5,000 Units by mouth every other day.      dicyclomine (BENTYL) 20 mg tablet Take 1 tablet (20 mg total) by mouth every 8 (eight) hours as needed. 30 tablet 3    diphenhydrAMINE (BENADRYL) 25 mg capsule Take 25 mg by mouth nightly as needed for Allergies or  Insomnia.      HYDROcodone-acetaminophen (NORCO) 5-325 mg per tablet Take 1 tablet by mouth every 6 (six) hours as needed for Pain. 16 tablet 0    liothyronine (CYTOMEL) 5 MCG Tab TAKE 1 TABLET BY MOUTH EVERY DAY 90 tablet 1    metoprolol succinate (TOPROL-XL) 25 MG 24 hr tablet TAKE 1/2 TABLET BY MOUTH ONCE DAILY 45 tablet 3    ondansetron (ZOFRAN) 8 MG tablet Take 1 tablet (8 mg total) by mouth every 12 (twelve) hours as needed for Nausea. 8 tablet 1    SYNTHROID 125 mcg tablet TAKE 1 TABLET BY MOUTH EVERY DAY FOR 6 DAYS A WEEK AND 1/2 TABLET ON DAY 7 78 tablet 2    [DISCONTINUED] ALPRAZolam (XANAX) 0.5 MG tablet Take 1 tablet (0.5 mg total) by mouth nightly as needed. 30 tablet 0     No current facility-administered medications on file prior to visit.        Family History   Problem Relation Age of Onset    Hypertension Father     Stroke Father     Heart disease Mother         valve    Hypertension Mother     Heart disease Brother     Hypertension Brother     Diabetes Brother     Amblyopia Neg Hx     Blindness Neg Hx     Cancer Neg Hx     Cataracts Neg Hx     Glaucoma Neg Hx     Macular degeneration Neg Hx     Retinal detachment Neg Hx     Strabismus Neg Hx     Thyroid disease Neg Hx     Anesthesia problems Neg Hx     Clotting disorder Neg Hx        Review of Systems   Constitutional: Negative for appetite change, chills, fever and unexpected weight change.   HENT: Negative for sore throat and trouble swallowing.    Eyes: Negative for pain and visual disturbance.   Respiratory: Negative for cough, shortness of breath and wheezing.    Cardiovascular: Negative for chest pain and palpitations.   Gastrointestinal: Negative for abdominal pain, blood in stool, diarrhea, nausea and vomiting.   Genitourinary: Negative for difficulty urinating, dysuria and hematuria.   Musculoskeletal: Negative for arthralgias, gait problem and neck pain.   Skin: Negative for rash and wound.   Neurological:  "Negative for dizziness, weakness, numbness and headaches.   Hematological: Negative for adenopathy.   Psychiatric/Behavioral: Negative for dysphoric mood.       Objective:      /70 (BP Location: Left arm, Patient Position: Sitting)   Pulse 71   Temp 98.1 °F (36.7 °C) (Oral)   Ht 5' 4" (1.626 m)   Wt 74.2 kg (163 lb 9.3 oz)   SpO2 98%   BMI 28.08 kg/m²   Physical Exam  Constitutional:       Appearance: Normal appearance. She is well-developed.   HENT:      Head: Normocephalic.      Mouth/Throat:      Pharynx: No oropharyngeal exudate or posterior oropharyngeal erythema.   Eyes:      Conjunctiva/sclera: Conjunctivae normal.      Pupils: Pupils are equal, round, and reactive to light.   Neck:      Musculoskeletal: Normal range of motion and neck supple.      Thyroid: No thyromegaly.   Cardiovascular:      Rate and Rhythm: Normal rate and regular rhythm.      Heart sounds: Normal heart sounds, S1 normal and S2 normal. No murmur. No friction rub. No gallop.    Pulmonary:      Effort: Pulmonary effort is normal.      Breath sounds: Normal breath sounds. No wheezing or rales.   Abdominal:      General: Bowel sounds are normal. There is no distension.      Palpations: Abdomen is soft.      Tenderness: There is no abdominal tenderness.   Musculoskeletal:      Right lower leg: No edema.      Left lower leg: No edema.   Lymphadenopathy:      Cervical: No cervical adenopathy.   Skin:     General: Skin is warm.      Findings: No rash.   Neurological:      Mental Status: She is alert and oriented to person, place, and time.      Cranial Nerves: No cranial nerve deficit.      Gait: Gait normal.         Results for orders placed or performed during the hospital encounter of 07/28/20   POCT glucose   Result Value Ref Range    POCT Glucose 92 70 - 110 mg/dL       Assessment:       1. Preventative health care    2. Anxiety    3. Primary insomnia    4. COVID-19 virus infection        Plan:       Preventative health care  -  "    CBC auto differential; Future; Expected date: 10/23/2020  -     Comprehensive Metabolic Panel; Future; Expected date: 10/23/2020  -     Lipid Panel; Future; Expected date: 10/23/2020    Anxiety  -     ALPRAZolam (XANAX) 0.5 MG tablet; Take 1 tablet (0.5 mg total) by mouth nightly as needed.  Dispense: 30 tablet; Refill: 0    Primary insomnia  -     ALPRAZolam (XANAX) 0.5 MG tablet; Take 1 tablet (0.5 mg total) by mouth nightly as needed.  Dispense: 30 tablet; Refill: 0    COVID-19 virus infection  -     COVID-19 (SARS CoV-2) IgG Antibody; Future; Expected date: 10/23/2020          Continue current medications and specialty follow-up  Counseled on regular exercise, maintenance of a healthy weight, balanced diet rich in fruits/vegetables and lean protein, and avoidance of unhealthy habits like smoking and excessive alcohol intake.

## 2020-10-29 ENCOUNTER — LAB VISIT (OUTPATIENT)
Dept: LAB | Facility: HOSPITAL | Age: 60
End: 2020-10-29
Attending: FAMILY MEDICINE
Payer: COMMERCIAL

## 2020-10-29 DIAGNOSIS — U07.1 COVID-19 VIRUS INFECTION: ICD-10-CM

## 2020-10-29 DIAGNOSIS — Z00.00 PREVENTATIVE HEALTH CARE: ICD-10-CM

## 2020-10-29 LAB
ALBUMIN SERPL BCP-MCNC: 4.1 G/DL (ref 3.5–5.2)
ALP SERPL-CCNC: 96 U/L (ref 55–135)
ALT SERPL W/O P-5'-P-CCNC: 11 U/L (ref 10–44)
ANION GAP SERPL CALC-SCNC: 8 MMOL/L (ref 8–16)
AST SERPL-CCNC: 15 U/L (ref 10–40)
BASOPHILS # BLD AUTO: 0.07 K/UL (ref 0–0.2)
BASOPHILS NFR BLD: 1.1 % (ref 0–1.9)
BILIRUB SERPL-MCNC: 0.5 MG/DL (ref 0.1–1)
BUN SERPL-MCNC: 11 MG/DL (ref 8–23)
CALCIUM SERPL-MCNC: 9.3 MG/DL (ref 8.7–10.5)
CHLORIDE SERPL-SCNC: 102 MMOL/L (ref 95–110)
CHOLEST SERPL-MCNC: 227 MG/DL (ref 120–199)
CHOLEST/HDLC SERPL: 2.9 {RATIO} (ref 2–5)
CO2 SERPL-SCNC: 28 MMOL/L (ref 23–29)
CREAT SERPL-MCNC: 0.8 MG/DL (ref 0.5–1.4)
DIFFERENTIAL METHOD: NORMAL
EOSINOPHIL # BLD AUTO: 0.1 K/UL (ref 0–0.5)
EOSINOPHIL NFR BLD: 2.1 % (ref 0–8)
ERYTHROCYTE [DISTWIDTH] IN BLOOD BY AUTOMATED COUNT: 13.2 % (ref 11.5–14.5)
EST. GFR  (AFRICAN AMERICAN): >60 ML/MIN/1.73 M^2
EST. GFR  (NON AFRICAN AMERICAN): >60 ML/MIN/1.73 M^2
GLUCOSE SERPL-MCNC: 97 MG/DL (ref 70–110)
HCT VFR BLD AUTO: 41.5 % (ref 37–48.5)
HDLC SERPL-MCNC: 79 MG/DL (ref 40–75)
HDLC SERPL: 34.8 % (ref 20–50)
HGB BLD-MCNC: 13.6 G/DL (ref 12–16)
IMM GRANULOCYTES # BLD AUTO: 0.01 K/UL (ref 0–0.04)
IMM GRANULOCYTES NFR BLD AUTO: 0.2 % (ref 0–0.5)
LDLC SERPL CALC-MCNC: 131.6 MG/DL (ref 63–159)
LYMPHOCYTES # BLD AUTO: 1.5 K/UL (ref 1–4.8)
LYMPHOCYTES NFR BLD: 24.8 % (ref 18–48)
MCH RBC QN AUTO: 30.8 PG (ref 27–31)
MCHC RBC AUTO-ENTMCNC: 32.8 G/DL (ref 32–36)
MCV RBC AUTO: 94 FL (ref 82–98)
MONOCYTES # BLD AUTO: 0.6 K/UL (ref 0.3–1)
MONOCYTES NFR BLD: 9.1 % (ref 4–15)
NEUTROPHILS # BLD AUTO: 3.9 K/UL (ref 1.8–7.7)
NEUTROPHILS NFR BLD: 62.7 % (ref 38–73)
NONHDLC SERPL-MCNC: 148 MG/DL
NRBC BLD-RTO: 0 /100 WBC
PLATELET # BLD AUTO: 315 K/UL (ref 150–350)
PMV BLD AUTO: 11 FL (ref 9.2–12.9)
POTASSIUM SERPL-SCNC: 4.1 MMOL/L (ref 3.5–5.1)
PROT SERPL-MCNC: 7.4 G/DL (ref 6–8.4)
RBC # BLD AUTO: 4.41 M/UL (ref 4–5.4)
SARS-COV-2 IGG SERPLBLD QL IA.RAPID: POSITIVE
SODIUM SERPL-SCNC: 138 MMOL/L (ref 136–145)
TRIGL SERPL-MCNC: 82 MG/DL (ref 30–150)
WBC # BLD AUTO: 6.17 K/UL (ref 3.9–12.7)

## 2020-10-29 PROCEDURE — 86769 SARS-COV-2 COVID-19 ANTIBODY: CPT

## 2020-10-29 PROCEDURE — 85025 COMPLETE CBC W/AUTO DIFF WBC: CPT

## 2020-10-29 PROCEDURE — 80061 LIPID PANEL: CPT

## 2020-10-29 PROCEDURE — 80053 COMPREHEN METABOLIC PANEL: CPT

## 2020-10-29 PROCEDURE — 36415 COLL VENOUS BLD VENIPUNCTURE: CPT | Mod: PO

## 2020-11-22 ENCOUNTER — PATIENT MESSAGE (OUTPATIENT)
Dept: FAMILY MEDICINE | Facility: CLINIC | Age: 60
End: 2020-11-22

## 2020-11-22 DIAGNOSIS — R00.2 PALPITATIONS: ICD-10-CM

## 2020-11-23 RX ORDER — METOPROLOL SUCCINATE 25 MG/1
25 TABLET, EXTENDED RELEASE ORAL DAILY
Qty: 90 TABLET | Refills: 3 | Status: SHIPPED | OUTPATIENT
Start: 2020-11-23 | End: 2021-12-28

## 2021-01-12 ENCOUNTER — OFFICE VISIT (OUTPATIENT)
Dept: OTOLARYNGOLOGY | Facility: CLINIC | Age: 61
End: 2021-01-12
Payer: COMMERCIAL

## 2021-01-12 ENCOUNTER — PATIENT OUTREACH (OUTPATIENT)
Dept: ADMINISTRATIVE | Facility: OTHER | Age: 61
End: 2021-01-12

## 2021-01-12 VITALS — HEIGHT: 64 IN | WEIGHT: 169.06 LBS | BODY MASS INDEX: 28.86 KG/M2

## 2021-01-12 DIAGNOSIS — S03.40XA SPRAIN OF TEMPOROMANDIBULAR JOINT, INITIAL ENCOUNTER: ICD-10-CM

## 2021-01-12 DIAGNOSIS — Z12.31 ENCOUNTER FOR SCREENING MAMMOGRAM FOR MALIGNANT NEOPLASM OF BREAST: Primary | ICD-10-CM

## 2021-01-12 DIAGNOSIS — M79.11 MASTICATORY MYALGIA: Primary | ICD-10-CM

## 2021-01-12 PROCEDURE — 99999 PR PBB SHADOW E&M-EST. PATIENT-LVL III: CPT | Mod: PBBFAC,,, | Performed by: OTOLARYNGOLOGY

## 2021-01-12 PROCEDURE — 3008F BODY MASS INDEX DOCD: CPT | Mod: CPTII,S$GLB,, | Performed by: OTOLARYNGOLOGY

## 2021-01-12 PROCEDURE — 1126F PR PAIN SEVERITY QUANTIFIED, NO PAIN PRESENT: ICD-10-PCS | Mod: S$GLB,,, | Performed by: OTOLARYNGOLOGY

## 2021-01-12 PROCEDURE — 99214 OFFICE O/P EST MOD 30 MIN: CPT | Mod: S$GLB,,, | Performed by: OTOLARYNGOLOGY

## 2021-01-12 PROCEDURE — 1126F AMNT PAIN NOTED NONE PRSNT: CPT | Mod: S$GLB,,, | Performed by: OTOLARYNGOLOGY

## 2021-01-12 PROCEDURE — 99999 PR PBB SHADOW E&M-EST. PATIENT-LVL III: ICD-10-PCS | Mod: PBBFAC,,, | Performed by: OTOLARYNGOLOGY

## 2021-01-12 PROCEDURE — 3008F PR BODY MASS INDEX (BMI) DOCUMENTED: ICD-10-PCS | Mod: CPTII,S$GLB,, | Performed by: OTOLARYNGOLOGY

## 2021-01-12 PROCEDURE — 99214 PR OFFICE/OUTPT VISIT, EST, LEVL IV, 30-39 MIN: ICD-10-PCS | Mod: S$GLB,,, | Performed by: OTOLARYNGOLOGY

## 2021-01-12 RX ORDER — PHENYLEPHRINE HCL 10 MG/1
10 TABLET, FILM COATED ORAL EVERY 4 HOURS PRN
COMMUNITY
End: 2021-08-12

## 2021-01-12 RX ORDER — CYCLOBENZAPRINE HCL 10 MG
10 TABLET ORAL 2 TIMES DAILY PRN
Qty: 30 TABLET | Refills: 1 | Status: SHIPPED | OUTPATIENT
Start: 2021-01-12 | End: 2021-01-22

## 2021-02-08 ENCOUNTER — IMMUNIZATION (OUTPATIENT)
Dept: PHARMACY | Facility: CLINIC | Age: 61
End: 2021-02-08
Payer: COMMERCIAL

## 2021-02-08 ENCOUNTER — PATIENT MESSAGE (OUTPATIENT)
Dept: FAMILY MEDICINE | Facility: CLINIC | Age: 61
End: 2021-02-08

## 2021-02-09 ENCOUNTER — PATIENT MESSAGE (OUTPATIENT)
Dept: ENDOCRINOLOGY | Facility: CLINIC | Age: 61
End: 2021-02-09

## 2021-02-25 ENCOUNTER — LAB VISIT (OUTPATIENT)
Dept: LAB | Facility: HOSPITAL | Age: 61
End: 2021-02-25
Attending: INTERNAL MEDICINE
Payer: COMMERCIAL

## 2021-02-25 DIAGNOSIS — E03.9 HYPOTHYROIDISM, UNSPECIFIED TYPE: ICD-10-CM

## 2021-02-25 PROCEDURE — 36415 COLL VENOUS BLD VENIPUNCTURE: CPT | Mod: PO

## 2021-02-25 PROCEDURE — 84480 ASSAY TRIIODOTHYRONINE (T3): CPT

## 2021-02-25 PROCEDURE — 84443 ASSAY THYROID STIM HORMONE: CPT

## 2021-02-25 PROCEDURE — 84439 ASSAY OF FREE THYROXINE: CPT

## 2021-02-26 LAB
T3 SERPL-MCNC: 94 NG/DL (ref 60–180)
T4 FREE SERPL-MCNC: 1.09 NG/DL (ref 0.71–1.51)
TSH SERPL DL<=0.005 MIU/L-ACNC: 1.62 UIU/ML (ref 0.4–4)

## 2021-02-28 ENCOUNTER — PATIENT OUTREACH (OUTPATIENT)
Dept: ADMINISTRATIVE | Facility: OTHER | Age: 61
End: 2021-02-28

## 2021-03-03 ENCOUNTER — LAB VISIT (OUTPATIENT)
Dept: LAB | Facility: HOSPITAL | Age: 61
End: 2021-03-03
Attending: INTERNAL MEDICINE
Payer: COMMERCIAL

## 2021-03-03 DIAGNOSIS — E55.9 VITAMIN D DEFICIENCY: ICD-10-CM

## 2021-03-03 DIAGNOSIS — E55.9 VITAMIN D DEFICIENCY: Primary | ICD-10-CM

## 2021-03-03 PROCEDURE — 36415 COLL VENOUS BLD VENIPUNCTURE: CPT | Mod: PO | Performed by: INTERNAL MEDICINE

## 2021-03-03 PROCEDURE — 82306 VITAMIN D 25 HYDROXY: CPT | Performed by: INTERNAL MEDICINE

## 2021-03-04 LAB — 25(OH)D3+25(OH)D2 SERPL-MCNC: 36 NG/ML (ref 30–96)

## 2021-03-17 ENCOUNTER — TELEPHONE (OUTPATIENT)
Dept: ENDOCRINOLOGY | Facility: CLINIC | Age: 61
End: 2021-03-17

## 2021-03-18 ENCOUNTER — OFFICE VISIT (OUTPATIENT)
Dept: ENDOCRINOLOGY | Facility: CLINIC | Age: 61
End: 2021-03-18
Payer: COMMERCIAL

## 2021-03-18 DIAGNOSIS — E03.9 HYPOTHYROIDISM, UNSPECIFIED TYPE: Primary | ICD-10-CM

## 2021-03-18 DIAGNOSIS — E55.9 VITAMIN D DEFICIENCY: ICD-10-CM

## 2021-03-18 PROCEDURE — 99213 OFFICE O/P EST LOW 20 MIN: CPT | Mod: 95,,, | Performed by: INTERNAL MEDICINE

## 2021-03-18 PROCEDURE — 99213 PR OFFICE/OUTPT VISIT, EST, LEVL III, 20-29 MIN: ICD-10-PCS | Mod: 95,,, | Performed by: INTERNAL MEDICINE

## 2021-03-19 ENCOUNTER — TELEPHONE (OUTPATIENT)
Dept: ENDOCRINOLOGY | Facility: CLINIC | Age: 61
End: 2021-03-19

## 2021-04-06 ENCOUNTER — PATIENT MESSAGE (OUTPATIENT)
Dept: ADMINISTRATIVE | Facility: HOSPITAL | Age: 61
End: 2021-04-06

## 2021-04-08 ENCOUNTER — IMMUNIZATION (OUTPATIENT)
Dept: PHARMACY | Facility: CLINIC | Age: 61
End: 2021-04-08
Payer: COMMERCIAL

## 2021-06-02 ENCOUNTER — PATIENT MESSAGE (OUTPATIENT)
Dept: FAMILY MEDICINE | Facility: CLINIC | Age: 61
End: 2021-06-02

## 2021-06-02 ENCOUNTER — OFFICE VISIT (OUTPATIENT)
Dept: FAMILY MEDICINE | Facility: CLINIC | Age: 61
End: 2021-06-02
Payer: COMMERCIAL

## 2021-06-02 ENCOUNTER — TELEPHONE (OUTPATIENT)
Dept: FAMILY MEDICINE | Facility: CLINIC | Age: 61
End: 2021-06-02

## 2021-06-02 DIAGNOSIS — R21 RASH: Primary | ICD-10-CM

## 2021-06-02 PROCEDURE — 99213 OFFICE O/P EST LOW 20 MIN: CPT | Mod: 95,,, | Performed by: NURSE PRACTITIONER

## 2021-06-02 PROCEDURE — 99213 PR OFFICE/OUTPT VISIT, EST, LEVL III, 20-29 MIN: ICD-10-PCS | Mod: 95,,, | Performed by: NURSE PRACTITIONER

## 2021-06-02 RX ORDER — VALACYCLOVIR HYDROCHLORIDE 1 G/1
1000 TABLET, FILM COATED ORAL 3 TIMES DAILY
Qty: 21 TABLET | Refills: 0 | Status: SHIPPED | OUTPATIENT
Start: 2021-06-02 | End: 2021-08-12

## 2021-06-02 RX ORDER — CLOTRIMAZOLE AND BETAMETHASONE DIPROPIONATE 10; .5 MG/ML; MG/ML
5 LOTION TOPICAL 2 TIMES DAILY
Qty: 70 ML | Refills: 0 | Status: SHIPPED | OUTPATIENT
Start: 2021-06-02 | End: 2021-06-09

## 2021-06-02 RX ORDER — FLUCONAZOLE 150 MG/1
150 TABLET ORAL DAILY
Qty: 1 TABLET | Refills: 0 | Status: SHIPPED | OUTPATIENT
Start: 2021-06-02 | End: 2021-06-03

## 2021-08-12 ENCOUNTER — OFFICE VISIT (OUTPATIENT)
Dept: FAMILY MEDICINE | Facility: CLINIC | Age: 61
End: 2021-08-12
Payer: COMMERCIAL

## 2021-08-12 DIAGNOSIS — R43.1 ABNORMAL SMELL: Primary | ICD-10-CM

## 2021-08-12 DIAGNOSIS — F51.01 PRIMARY INSOMNIA: ICD-10-CM

## 2021-08-12 DIAGNOSIS — F41.9 ANXIETY: ICD-10-CM

## 2021-08-12 DIAGNOSIS — R06.00 DYSPNEA, UNSPECIFIED TYPE: ICD-10-CM

## 2021-08-12 PROCEDURE — 99213 OFFICE O/P EST LOW 20 MIN: CPT | Mod: 95,,, | Performed by: FAMILY MEDICINE

## 2021-08-12 PROCEDURE — 99213 PR OFFICE/OUTPT VISIT, EST, LEVL III, 20-29 MIN: ICD-10-PCS | Mod: 95,,, | Performed by: FAMILY MEDICINE

## 2021-08-12 RX ORDER — DICYCLOMINE HYDROCHLORIDE 20 MG/1
20 TABLET ORAL EVERY 8 HOURS PRN
Qty: 30 TABLET | Refills: 3 | Status: SHIPPED | OUTPATIENT
Start: 2021-08-12 | End: 2023-09-21 | Stop reason: SDUPTHER

## 2021-08-12 RX ORDER — ALPRAZOLAM 0.5 MG/1
0.5 TABLET ORAL NIGHTLY PRN
Qty: 30 TABLET | Refills: 0 | Status: SHIPPED | OUTPATIENT
Start: 2021-08-12 | End: 2022-05-02 | Stop reason: SDUPTHER

## 2021-08-16 ENCOUNTER — PATIENT MESSAGE (OUTPATIENT)
Dept: FAMILY MEDICINE | Facility: CLINIC | Age: 61
End: 2021-08-16

## 2021-08-16 DIAGNOSIS — Z86.16 HISTORY OF COVID-19: Primary | ICD-10-CM

## 2021-08-17 ENCOUNTER — LAB VISIT (OUTPATIENT)
Dept: LAB | Facility: HOSPITAL | Age: 61
End: 2021-08-17
Attending: FAMILY MEDICINE
Payer: COMMERCIAL

## 2021-08-17 DIAGNOSIS — Z86.16 HISTORY OF COVID-19: ICD-10-CM

## 2021-08-17 PROCEDURE — 86769 SARS-COV-2 COVID-19 ANTIBODY: CPT | Performed by: FAMILY MEDICINE

## 2021-08-17 PROCEDURE — 36415 COLL VENOUS BLD VENIPUNCTURE: CPT | Mod: PO | Performed by: FAMILY MEDICINE

## 2021-08-18 LAB
SARS-COV-2 IGG SERPL IA-ACNC: 685.5 AU/ML
SARS-COV-2 IGG SERPL QL IA: POSITIVE

## 2021-10-07 ENCOUNTER — PATIENT OUTREACH (OUTPATIENT)
Dept: ADMINISTRATIVE | Facility: OTHER | Age: 61
End: 2021-10-07

## 2021-10-08 ENCOUNTER — TELEPHONE (OUTPATIENT)
Dept: PAIN MEDICINE | Facility: CLINIC | Age: 61
End: 2021-10-08

## 2021-10-08 DIAGNOSIS — M54.16 LUMBAR RADICULOPATHY: Primary | ICD-10-CM

## 2021-10-18 ENCOUNTER — LAB VISIT (OUTPATIENT)
Dept: PRIMARY CARE CLINIC | Facility: CLINIC | Age: 61
End: 2021-10-18
Payer: COMMERCIAL

## 2021-10-18 ENCOUNTER — PATIENT MESSAGE (OUTPATIENT)
Dept: SURGERY | Facility: AMBULARY SURGERY CENTER | Age: 61
End: 2021-10-18

## 2021-10-18 DIAGNOSIS — M54.16 LUMBAR RADICULOPATHY: ICD-10-CM

## 2021-10-18 PROCEDURE — U0003 INFECTIOUS AGENT DETECTION BY NUCLEIC ACID (DNA OR RNA); SEVERE ACUTE RESPIRATORY SYNDROME CORONAVIRUS 2 (SARS-COV-2) (CORONAVIRUS DISEASE [COVID-19]), AMPLIFIED PROBE TECHNIQUE, MAKING USE OF HIGH THROUGHPUT TECHNOLOGIES AS DESCRIBED BY CMS-2020-01-R: HCPCS | Performed by: ANESTHESIOLOGY

## 2021-10-18 PROCEDURE — U0005 INFEC AGEN DETEC AMPLI PROBE: HCPCS | Performed by: ANESTHESIOLOGY

## 2021-10-19 LAB
SARS-COV-2 RNA RESP QL NAA+PROBE: NOT DETECTED
SARS-COV-2- CYCLE NUMBER: NORMAL

## 2021-10-21 ENCOUNTER — HOSPITAL ENCOUNTER (OUTPATIENT)
Facility: AMBULARY SURGERY CENTER | Age: 61
Discharge: HOME | End: 2021-10-21
Attending: ANESTHESIOLOGY | Admitting: ANESTHESIOLOGY
Payer: COMMERCIAL

## 2021-10-21 DIAGNOSIS — M54.16 LUMBAR RADICULITIS: Primary | ICD-10-CM

## 2021-10-21 DIAGNOSIS — M51.36 DDD (DEGENERATIVE DISC DISEASE), LUMBAR: ICD-10-CM

## 2021-10-21 PROCEDURE — 62323 NJX INTERLAMINAR LMBR/SAC: CPT | Performed by: ANESTHESIOLOGY

## 2021-10-21 PROCEDURE — 62323 NJX INTERLAMINAR LMBR/SAC: CPT | Mod: ,,, | Performed by: ANESTHESIOLOGY

## 2021-10-21 PROCEDURE — 62323 PR INJ LUMBAR/SACRAL, W/IMAGING GUIDANCE: ICD-10-PCS | Mod: ,,, | Performed by: ANESTHESIOLOGY

## 2021-10-21 PROCEDURE — 64483 NJX AA&/STRD TFRM EPI L/S 1: CPT | Performed by: ANESTHESIOLOGY

## 2021-10-21 RX ORDER — MIDAZOLAM HYDROCHLORIDE 1 MG/ML
INJECTION INTRAMUSCULAR; INTRAVENOUS
Status: DISCONTINUED | OUTPATIENT
Start: 2021-10-21 | End: 2021-10-21 | Stop reason: HOSPADM

## 2021-10-21 RX ORDER — SODIUM CHLORIDE, SODIUM LACTATE, POTASSIUM CHLORIDE, CALCIUM CHLORIDE 600; 310; 30; 20 MG/100ML; MG/100ML; MG/100ML; MG/100ML
INJECTION, SOLUTION INTRAVENOUS ONCE AS NEEDED
Status: DISCONTINUED | OUTPATIENT
Start: 2021-10-21 | End: 2021-10-21 | Stop reason: HOSPADM

## 2021-10-21 RX ORDER — SODIUM CHLORIDE 0.9 % (FLUSH) 0.9 %
SYRINGE (ML) INJECTION
Status: DISCONTINUED | OUTPATIENT
Start: 2021-10-21 | End: 2021-10-21 | Stop reason: HOSPADM

## 2021-10-21 RX ORDER — FENTANYL CITRATE 50 UG/ML
INJECTION, SOLUTION INTRAMUSCULAR; INTRAVENOUS
Status: DISCONTINUED | OUTPATIENT
Start: 2021-10-21 | End: 2021-10-21 | Stop reason: HOSPADM

## 2021-10-21 RX ORDER — LIDOCAINE HYDROCHLORIDE 10 MG/ML
INJECTION, SOLUTION EPIDURAL; INFILTRATION; INTRACAUDAL; PERINEURAL
Status: DISCONTINUED | OUTPATIENT
Start: 2021-10-21 | End: 2021-10-21 | Stop reason: HOSPADM

## 2021-10-21 RX ORDER — DEXAMETHASONE SODIUM PHOSPHATE 10 MG/ML
INJECTION INTRAMUSCULAR; INTRAVENOUS
Status: DISCONTINUED | OUTPATIENT
Start: 2021-10-21 | End: 2021-10-21 | Stop reason: HOSPADM

## 2021-10-22 VITALS
RESPIRATION RATE: 16 BRPM | BODY MASS INDEX: 28.85 KG/M2 | HEART RATE: 64 BPM | TEMPERATURE: 99 F | SYSTOLIC BLOOD PRESSURE: 107 MMHG | DIASTOLIC BLOOD PRESSURE: 64 MMHG | WEIGHT: 169 LBS | OXYGEN SATURATION: 96 % | HEIGHT: 64 IN

## 2021-10-28 ENCOUNTER — OFFICE VISIT (OUTPATIENT)
Dept: OBSTETRICS AND GYNECOLOGY | Facility: CLINIC | Age: 61
End: 2021-10-28
Payer: COMMERCIAL

## 2021-10-28 VITALS
SYSTOLIC BLOOD PRESSURE: 110 MMHG | HEIGHT: 64 IN | BODY MASS INDEX: 28.46 KG/M2 | DIASTOLIC BLOOD PRESSURE: 70 MMHG | WEIGHT: 166.69 LBS

## 2021-10-28 DIAGNOSIS — N89.8 VAGINA ITCHING: ICD-10-CM

## 2021-10-28 DIAGNOSIS — Z01.411 ENCOUNTER FOR GYNECOLOGICAL EXAMINATION (GENERAL) (ROUTINE) WITH ABNORMAL FINDINGS: Primary | ICD-10-CM

## 2021-10-28 DIAGNOSIS — Z12.4 CERVICAL CANCER SCREENING: ICD-10-CM

## 2021-10-28 DIAGNOSIS — Z12.31 BREAST CANCER SCREENING BY MAMMOGRAM: ICD-10-CM

## 2021-10-28 DIAGNOSIS — N89.8 VAGINAL LESION: ICD-10-CM

## 2021-10-28 PROCEDURE — 3078F PR MOST RECENT DIASTOLIC BLOOD PRESSURE < 80 MM HG: ICD-10-PCS | Mod: CPTII,S$GLB,, | Performed by: OBSTETRICS & GYNECOLOGY

## 2021-10-28 PROCEDURE — 1159F MED LIST DOCD IN RCRD: CPT | Mod: CPTII,S$GLB,, | Performed by: OBSTETRICS & GYNECOLOGY

## 2021-10-28 PROCEDURE — 99386 PREV VISIT NEW AGE 40-64: CPT | Mod: 25,S$GLB,, | Performed by: OBSTETRICS & GYNECOLOGY

## 2021-10-28 PROCEDURE — 3008F PR BODY MASS INDEX (BMI) DOCUMENTED: ICD-10-PCS | Mod: CPTII,S$GLB,, | Performed by: OBSTETRICS & GYNECOLOGY

## 2021-10-28 PROCEDURE — 3078F DIAST BP <80 MM HG: CPT | Mod: CPTII,S$GLB,, | Performed by: OBSTETRICS & GYNECOLOGY

## 2021-10-28 PROCEDURE — 88175 CYTOPATH C/V AUTO FLUID REDO: CPT | Performed by: OBSTETRICS & GYNECOLOGY

## 2021-10-28 PROCEDURE — 3074F PR MOST RECENT SYSTOLIC BLOOD PRESSURE < 130 MM HG: ICD-10-PCS | Mod: CPTII,S$GLB,, | Performed by: OBSTETRICS & GYNECOLOGY

## 2021-10-28 PROCEDURE — 87624 HPV HI-RISK TYP POOLED RSLT: CPT | Performed by: OBSTETRICS & GYNECOLOGY

## 2021-10-28 PROCEDURE — 99999 PR PBB SHADOW E&M-EST. PATIENT-LVL III: ICD-10-PCS | Mod: PBBFAC,,, | Performed by: OBSTETRICS & GYNECOLOGY

## 2021-10-28 PROCEDURE — 99999 PR PBB SHADOW E&M-EST. PATIENT-LVL III: CPT | Mod: PBBFAC,,, | Performed by: OBSTETRICS & GYNECOLOGY

## 2021-10-28 PROCEDURE — 1159F PR MEDICATION LIST DOCUMENTED IN MEDICAL RECORD: ICD-10-PCS | Mod: CPTII,S$GLB,, | Performed by: OBSTETRICS & GYNECOLOGY

## 2021-10-28 PROCEDURE — 99386 PR PREVENTIVE VISIT,NEW,40-64: ICD-10-PCS | Mod: 25,S$GLB,, | Performed by: OBSTETRICS & GYNECOLOGY

## 2021-10-28 PROCEDURE — 3008F BODY MASS INDEX DOCD: CPT | Mod: CPTII,S$GLB,, | Performed by: OBSTETRICS & GYNECOLOGY

## 2021-10-28 PROCEDURE — 3074F SYST BP LT 130 MM HG: CPT | Mod: CPTII,S$GLB,, | Performed by: OBSTETRICS & GYNECOLOGY

## 2021-11-02 ENCOUNTER — PATIENT MESSAGE (OUTPATIENT)
Dept: OBSTETRICS AND GYNECOLOGY | Facility: CLINIC | Age: 61
End: 2021-11-02
Payer: COMMERCIAL

## 2021-11-04 LAB
FINAL PATHOLOGIC DIAGNOSIS: NORMAL
Lab: NORMAL

## 2021-11-05 ENCOUNTER — OFFICE VISIT (OUTPATIENT)
Dept: OBSTETRICS AND GYNECOLOGY | Facility: CLINIC | Age: 61
End: 2021-11-05
Payer: COMMERCIAL

## 2021-11-05 VITALS — HEIGHT: 64 IN | WEIGHT: 168.19 LBS | BODY MASS INDEX: 28.71 KG/M2

## 2021-11-05 DIAGNOSIS — N89.8 VAGINAL LESION: Primary | ICD-10-CM

## 2021-11-05 DIAGNOSIS — R10.2 VAGINAL PAIN: ICD-10-CM

## 2021-11-05 PROCEDURE — 88305 TISSUE EXAM BY PATHOLOGIST: ICD-10-PCS | Mod: 26,,, | Performed by: PATHOLOGY

## 2021-11-05 PROCEDURE — 99999 PR PBB SHADOW E&M-EST. PATIENT-LVL III: CPT | Mod: PBBFAC,,, | Performed by: OBSTETRICS & GYNECOLOGY

## 2021-11-05 PROCEDURE — 1159F MED LIST DOCD IN RCRD: CPT | Mod: CPTII,S$GLB,, | Performed by: OBSTETRICS & GYNECOLOGY

## 2021-11-05 PROCEDURE — 1160F PR REVIEW ALL MEDS BY PRESCRIBER/CLIN PHARMACIST DOCUMENTED: ICD-10-PCS | Mod: CPTII,S$GLB,, | Performed by: OBSTETRICS & GYNECOLOGY

## 2021-11-05 PROCEDURE — 99999 PR PBB SHADOW E&M-EST. PATIENT-LVL III: ICD-10-PCS | Mod: PBBFAC,,, | Performed by: OBSTETRICS & GYNECOLOGY

## 2021-11-05 PROCEDURE — 3008F PR BODY MASS INDEX (BMI) DOCUMENTED: ICD-10-PCS | Mod: CPTII,S$GLB,, | Performed by: OBSTETRICS & GYNECOLOGY

## 2021-11-05 PROCEDURE — 1159F PR MEDICATION LIST DOCUMENTED IN MEDICAL RECORD: ICD-10-PCS | Mod: CPTII,S$GLB,, | Performed by: OBSTETRICS & GYNECOLOGY

## 2021-11-05 PROCEDURE — 99499 UNLISTED E&M SERVICE: CPT | Mod: S$GLB,,, | Performed by: OBSTETRICS & GYNECOLOGY

## 2021-11-05 PROCEDURE — 56605 BIOPSY OF VULVA/PERINEUM: CPT | Mod: S$GLB,,, | Performed by: OBSTETRICS & GYNECOLOGY

## 2021-11-05 PROCEDURE — 1160F RVW MEDS BY RX/DR IN RCRD: CPT | Mod: CPTII,S$GLB,, | Performed by: OBSTETRICS & GYNECOLOGY

## 2021-11-05 PROCEDURE — 88305 TISSUE EXAM BY PATHOLOGIST: CPT | Performed by: PATHOLOGY

## 2021-11-05 PROCEDURE — 88305 TISSUE EXAM BY PATHOLOGIST: CPT | Mod: 26,,, | Performed by: PATHOLOGY

## 2021-11-05 PROCEDURE — 56605 BIOPSY (GYNECOLOGICAL): ICD-10-PCS | Mod: S$GLB,,, | Performed by: OBSTETRICS & GYNECOLOGY

## 2021-11-05 PROCEDURE — 99499 NO LOS: ICD-10-PCS | Mod: S$GLB,,, | Performed by: OBSTETRICS & GYNECOLOGY

## 2021-11-05 PROCEDURE — 3008F BODY MASS INDEX DOCD: CPT | Mod: CPTII,S$GLB,, | Performed by: OBSTETRICS & GYNECOLOGY

## 2021-11-05 RX ORDER — LIDOCAINE AND PRILOCAINE 25; 25 MG/G; MG/G
CREAM TOPICAL
Qty: 30 G | Refills: 0 | Status: SHIPPED | OUTPATIENT
Start: 2021-11-05 | End: 2021-11-18

## 2021-11-08 LAB
HPV HR 12 DNA SPEC QL NAA+PROBE: NEGATIVE
HPV16 AG SPEC QL: NEGATIVE
HPV18 DNA SPEC QL NAA+PROBE: NEGATIVE

## 2021-11-10 LAB
FINAL PATHOLOGIC DIAGNOSIS: NORMAL
GROSS: NORMAL
Lab: NORMAL

## 2021-11-15 ENCOUNTER — PATIENT OUTREACH (OUTPATIENT)
Dept: ADMINISTRATIVE | Facility: OTHER | Age: 61
End: 2021-11-15
Payer: COMMERCIAL

## 2021-11-16 ENCOUNTER — TELEPHONE (OUTPATIENT)
Dept: OBSTETRICS AND GYNECOLOGY | Facility: CLINIC | Age: 61
End: 2021-11-16
Payer: COMMERCIAL

## 2021-11-18 ENCOUNTER — OFFICE VISIT (OUTPATIENT)
Dept: OBSTETRICS AND GYNECOLOGY | Facility: CLINIC | Age: 61
End: 2021-11-18
Payer: COMMERCIAL

## 2021-11-18 ENCOUNTER — OFFICE VISIT (OUTPATIENT)
Dept: PAIN MEDICINE | Facility: CLINIC | Age: 61
End: 2021-11-18
Payer: COMMERCIAL

## 2021-11-18 VITALS
BODY MASS INDEX: 28.68 KG/M2 | SYSTOLIC BLOOD PRESSURE: 106 MMHG | DIASTOLIC BLOOD PRESSURE: 65 MMHG | HEIGHT: 64 IN | WEIGHT: 168 LBS | HEART RATE: 68 BPM

## 2021-11-18 DIAGNOSIS — M51.36 DDD (DEGENERATIVE DISC DISEASE), LUMBAR: Primary | ICD-10-CM

## 2021-11-18 DIAGNOSIS — M47.896 OTHER SPONDYLOSIS, LUMBAR REGION: ICD-10-CM

## 2021-11-18 DIAGNOSIS — N90.4 LICHEN SCLEROSUS ET ATROPHICUS OF THE VULVA: Primary | ICD-10-CM

## 2021-11-18 DIAGNOSIS — M53.3 COCCYDYNIA: ICD-10-CM

## 2021-11-18 PROCEDURE — 99213 PR OFFICE/OUTPT VISIT, EST, LEVL III, 20-29 MIN: ICD-10-PCS | Mod: 95,,, | Performed by: OBSTETRICS & GYNECOLOGY

## 2021-11-18 PROCEDURE — 99999 PR PBB SHADOW E&M-EST. PATIENT-LVL III: CPT | Mod: PBBFAC,,, | Performed by: PHYSICIAN ASSISTANT

## 2021-11-18 PROCEDURE — 1159F MED LIST DOCD IN RCRD: CPT | Mod: CPTII,S$GLB,, | Performed by: PHYSICIAN ASSISTANT

## 2021-11-18 PROCEDURE — 99213 OFFICE O/P EST LOW 20 MIN: CPT | Mod: S$GLB,,, | Performed by: PHYSICIAN ASSISTANT

## 2021-11-18 PROCEDURE — 99213 OFFICE O/P EST LOW 20 MIN: CPT | Mod: 95,,, | Performed by: OBSTETRICS & GYNECOLOGY

## 2021-11-18 PROCEDURE — 3074F SYST BP LT 130 MM HG: CPT | Mod: CPTII,S$GLB,, | Performed by: PHYSICIAN ASSISTANT

## 2021-11-18 PROCEDURE — 99999 PR PBB SHADOW E&M-EST. PATIENT-LVL III: ICD-10-PCS | Mod: PBBFAC,,, | Performed by: PHYSICIAN ASSISTANT

## 2021-11-18 PROCEDURE — 3008F BODY MASS INDEX DOCD: CPT | Mod: CPTII,S$GLB,, | Performed by: PHYSICIAN ASSISTANT

## 2021-11-18 PROCEDURE — 99213 PR OFFICE/OUTPT VISIT, EST, LEVL III, 20-29 MIN: ICD-10-PCS | Mod: S$GLB,,, | Performed by: PHYSICIAN ASSISTANT

## 2021-11-18 PROCEDURE — 3078F PR MOST RECENT DIASTOLIC BLOOD PRESSURE < 80 MM HG: ICD-10-PCS | Mod: CPTII,S$GLB,, | Performed by: PHYSICIAN ASSISTANT

## 2021-11-18 PROCEDURE — 3008F PR BODY MASS INDEX (BMI) DOCUMENTED: ICD-10-PCS | Mod: CPTII,S$GLB,, | Performed by: PHYSICIAN ASSISTANT

## 2021-11-18 PROCEDURE — 3074F PR MOST RECENT SYSTOLIC BLOOD PRESSURE < 130 MM HG: ICD-10-PCS | Mod: CPTII,S$GLB,, | Performed by: PHYSICIAN ASSISTANT

## 2021-11-18 PROCEDURE — 3078F DIAST BP <80 MM HG: CPT | Mod: CPTII,S$GLB,, | Performed by: PHYSICIAN ASSISTANT

## 2021-11-18 PROCEDURE — 1159F PR MEDICATION LIST DOCUMENTED IN MEDICAL RECORD: ICD-10-PCS | Mod: CPTII,S$GLB,, | Performed by: PHYSICIAN ASSISTANT

## 2021-11-18 RX ORDER — CLOBETASOL PROPIONATE 0.5 MG/G
OINTMENT TOPICAL
Qty: 45 G | Refills: 1 | Status: SHIPPED | OUTPATIENT
Start: 2021-11-18 | End: 2022-09-23

## 2022-01-31 ENCOUNTER — PATIENT OUTREACH (OUTPATIENT)
Dept: ADMINISTRATIVE | Facility: OTHER | Age: 62
End: 2022-01-31
Payer: COMMERCIAL

## 2022-01-31 NOTE — PROGRESS NOTES
Chart was reviewed for overdue Proactive [facility] Encounters (RUBIN)  topics  Updates were requested from care everywhere  Health Maintenance has been updated  [place] immunization registry triggered  Mammogram ordered previously

## 2022-02-01 DIAGNOSIS — M25.512 LEFT SHOULDER PAIN, UNSPECIFIED CHRONICITY: Primary | ICD-10-CM

## 2022-02-02 ENCOUNTER — OFFICE VISIT (OUTPATIENT)
Dept: ORTHOPEDICS | Facility: CLINIC | Age: 62
End: 2022-02-02
Payer: COMMERCIAL

## 2022-02-02 ENCOUNTER — HOSPITAL ENCOUNTER (OUTPATIENT)
Dept: RADIOLOGY | Facility: HOSPITAL | Age: 62
Discharge: HOME | End: 2022-02-02
Attending: ORTHOPAEDIC SURGERY
Payer: COMMERCIAL

## 2022-02-02 VITALS — BODY MASS INDEX: 28.68 KG/M2 | WEIGHT: 168 LBS | HEIGHT: 64 IN | RESPIRATION RATE: 18 BRPM

## 2022-02-02 DIAGNOSIS — M25.512 LEFT SHOULDER PAIN, UNSPECIFIED CHRONICITY: Primary | ICD-10-CM

## 2022-02-02 DIAGNOSIS — M25.512 LEFT SHOULDER PAIN, UNSPECIFIED CHRONICITY: ICD-10-CM

## 2022-02-02 PROCEDURE — 73030 XR SHOULDER TRAUMA 3 VIEW LEFT: ICD-10-PCS | Mod: 26,LT,, | Performed by: RADIOLOGY

## 2022-02-02 PROCEDURE — 99999 PR PBB SHADOW E&M-EST. PATIENT-LVL III: CPT | Mod: PBBFAC,,, | Performed by: ORTHOPAEDIC SURGERY

## 2022-02-02 PROCEDURE — 99213 PR OFFICE/OUTPT VISIT, EST, LEVL III, 20-29 MIN: ICD-10-PCS | Mod: 25,S$GLB,, | Performed by: ORTHOPAEDIC SURGERY

## 2022-02-02 PROCEDURE — 73030 X-RAY EXAM OF SHOULDER: CPT | Mod: TC,PN,LT

## 2022-02-02 PROCEDURE — 73030 X-RAY EXAM OF SHOULDER: CPT | Mod: 26,LT,, | Performed by: RADIOLOGY

## 2022-02-02 PROCEDURE — 99999 PR PBB SHADOW E&M-EST. PATIENT-LVL III: ICD-10-PCS | Mod: PBBFAC,,, | Performed by: ORTHOPAEDIC SURGERY

## 2022-02-02 PROCEDURE — 99213 OFFICE O/P EST LOW 20 MIN: CPT | Mod: 25,S$GLB,, | Performed by: ORTHOPAEDIC SURGERY

## 2022-02-02 PROCEDURE — 1160F RVW MEDS BY RX/DR IN RCRD: CPT | Mod: CPTII,S$GLB,, | Performed by: ORTHOPAEDIC SURGERY

## 2022-02-02 PROCEDURE — 1159F PR MEDICATION LIST DOCUMENTED IN MEDICAL RECORD: ICD-10-PCS | Mod: CPTII,S$GLB,, | Performed by: ORTHOPAEDIC SURGERY

## 2022-02-02 PROCEDURE — 20610 DRAIN/INJ JOINT/BURSA W/O US: CPT | Mod: LT,S$GLB,, | Performed by: ORTHOPAEDIC SURGERY

## 2022-02-02 PROCEDURE — 3008F BODY MASS INDEX DOCD: CPT | Mod: CPTII,S$GLB,, | Performed by: ORTHOPAEDIC SURGERY

## 2022-02-02 PROCEDURE — 1159F MED LIST DOCD IN RCRD: CPT | Mod: CPTII,S$GLB,, | Performed by: ORTHOPAEDIC SURGERY

## 2022-02-02 PROCEDURE — 20610 LARGE JOINT ASPIRATION/INJECTION: L SUBACROMIAL BURSA: ICD-10-PCS | Mod: LT,S$GLB,, | Performed by: ORTHOPAEDIC SURGERY

## 2022-02-02 PROCEDURE — 3008F PR BODY MASS INDEX (BMI) DOCUMENTED: ICD-10-PCS | Mod: CPTII,S$GLB,, | Performed by: ORTHOPAEDIC SURGERY

## 2022-02-02 PROCEDURE — 1160F PR REVIEW ALL MEDS BY PRESCRIBER/CLIN PHARMACIST DOCUMENTED: ICD-10-PCS | Mod: CPTII,S$GLB,, | Performed by: ORTHOPAEDIC SURGERY

## 2022-02-02 RX ORDER — TRIAMCINOLONE ACETONIDE 40 MG/ML
40 INJECTION, SUSPENSION INTRA-ARTICULAR; INTRAMUSCULAR
Status: DISCONTINUED | OUTPATIENT
Start: 2022-02-02 | End: 2022-02-02 | Stop reason: HOSPADM

## 2022-02-02 RX ADMIN — TRIAMCINOLONE ACETONIDE 40 MG: 40 INJECTION, SUSPENSION INTRA-ARTICULAR; INTRAMUSCULAR at 01:02

## 2022-02-02 NOTE — PROCEDURES
Large Joint Aspiration/Injection: L subacromial bursa    Date/Time: 2/2/2022 1:00 PM  Performed by: Danny Harmon MD  Authorized by: Danny Harmon MD     Consent Done?:  Yes (Verbal)  Indications:  Pain  Timeout: prior to procedure the correct patient, procedure, and site was verified    Approach:  Lateral  Location:  Shoulder  Site:  L subacromial bursa  Medications:  40 mg triamcinolone acetonide 40 mg/mL

## 2022-02-02 NOTE — PROGRESS NOTES
Past Medical History:   Diagnosis Date    Abnormal Pap smear     Abnormal Pap smear of cervix     Anxiety     Arthritis     Cataract     Grave's disease     Grave's disease     IBS (irritable bowel syndrome)     rare    Internal hemorrhoids     followed by Dr. Schilling    Nephrolithiasis     followed by Dr. Pope    Thyroid disease     Vitamin D deficiency        Past Surgical History:   Procedure Laterality Date    CATARACT EXTRACTION  11/19/12    right eye (toric)    CATARACT EXTRACTION W/  INTRAOCULAR LENS IMPLANT  11/26/12    left eye (toric)    EPIDURAL STEROID INJECTION INTO LUMBAR SPINE N/A 10/21/2021    Procedure: INJECTION, STEROID, SPINE, LUMBAR, EPIDURAL;  Surgeon: Marshall Lane MD;  Location: [facility];  Service: Pain Management;  Laterality: N/A;  L5-S1    EXTRACORPOREAL SHOCK WAVE LITHOTRIPSY      EYE SURGERY      eyelid surgery    LITHOTRIPSY      REATTACHMENT OF MUSCLE Right 12/27/2019    Procedure: REATTACHMENT, MUSCLE;  Surgeon: Karoline Jaimes MD;  Location: [address];  Service: Ophthalmology;  Laterality: Right;    tonsillectomy      TRANSFORAMINAL EPIDURAL INJECTION OF STEROID Left 3/24/2020    Procedure: Injection,steroid,epidural,transforaminal approach;  Surgeon: Marshall Lane MD;  Location: [facility];  Service: Pain Management;  Laterality: Left;  L4-5, L5-S1    TRANSFORAMINAL EPIDURAL INJECTION OF STEROID Left 7/28/2020    Procedure: INJECTION, STEROID, EPIDURAL, TRANSFORAMINAL APPROACH;  Surgeon: Marshall Lane MD;  Location: [facility];  Service: Pain Management;  Laterality: Left;  L4-5, L5-S1  leave last on the schedule.  will not need to retest for covid.  Verify no fever/off antiinflammatories and no covid symptoms.       Current Outpatient Medications   Medication Sig    ALPRAZolam (XANAX) 0.5 MG tablet Take 1 tablet (0.5 mg total) by mouth nightly as needed.    cholecalciferol, vitamin D3, 5,000 unit Tab Take 5,000 Units by mouth every other day.    clobetasol 0.05%  (TEMOVATE) 0.05 % Oint Clobetasol propionate 0.05% once daily at night for 4 weeks, then alternate nights for 4 weeks, and then twice weekly for 4 weeks    dicyclomine (BENTYL) 20 mg tablet Take 1 tablet (20 mg total) by mouth every 8 (eight) hours as needed.    diphenhydrAMINE (BENADRYL) 25 mg capsule Take 25 mg by mouth nightly as needed for Allergies or Insomnia.    liothyronine (CYTOMEL) 5 MCG Tab TAKE 1 TABLET BY MOUTH EVERY DAY    metoprolol succinate (TOPROL-XL) 25 MG 24 hr tablet TAKE 1 TABLET BY MOUTH EVERY DAY    SYNTHROID 125 mcg tablet TAKE 1 TABLET BY MOUTH EVERY DAY FOR 6 DAYS A WEEK AND 1/2 TABLET ON DAY 7     No current facility-administered medications for this visit.       Review of patient's allergies indicates:   Allergen Reactions    Codeine Nausea And Vomiting    Sulfa (sulfonamide antibiotics) Itching       Family History   Problem Relation Age of Onset    Hypertension Father     Stroke Father     Heart disease Mother         valve    Hypertension Mother     Heart disease Brother     Hypertension Brother     Diabetes Brother     Amblyopia Neg Hx     Blindness Neg Hx     Cancer Neg Hx     Cataracts Neg Hx     Glaucoma Neg Hx     Macular degeneration Neg Hx     Retinal detachment Neg Hx     Strabismus Neg Hx     Thyroid disease Neg Hx     Anesthesia problems Neg Hx     Clotting disorder Neg Hx     Breast cancer Neg Hx     Ovarian cancer Neg Hx        Social History     Socioeconomic History    Marital status:     Number of children: 2   Occupational History    Occupation: [employer]     Employer: [employer]   Tobacco Use    Smoking status: Never Smoker    Smokeless tobacco: Never Used   Substance and Sexual Activity    Alcohol use: Yes     Comment: q month    Drug use: No    Sexual activity: Yes     Partners: Male     Birth control/protection: Post-menopausal     Social Determinants of Health     Financial Resource Strain: Low Risk      "Difficulty of Paying Living Expenses: Not hard at all   Food Insecurity: No Food Insecurity    Worried About Running Out of Food in the Last Year: Never true    Ran Out of Food in the Last Year: Never true   Transportation Needs: No Transportation Needs    Lack of Transportation (Medical): No    Lack of Transportation (Non-Medical): No   Physical Activity: Insufficiently Active    Days of Exercise per Week: 3 days    Minutes of Exercise per Session: 30 min   Stress: No Stress Concern Present    Feeling of Stress : Not at all   Social Connections: Unknown    Frequency of Communication with Friends and Family: Twice a week    Frequency of Social Gatherings with Friends and Family: Once a week    Active Member of Clubs or Organizations: Yes    Attends Club or Organization Meetings: More than 4 times per year    Marital Status:        Chief Complaint:   Chief Complaint   Patient presents with    Left Shoulder - Pain       Consulting Physician: No ref. provider found    History of present illness:    This is a 61 y.o. female who complains of left shoulder pain.  She denies any injury. Has had this for years. Pain 9/10 and worse with use. Decreased ROM.  The previous injections we gave her work for well over a year.    Review of Systems:    Constitution: Denies chills, fever, and sweats.  HENT: Denies headaches or blurry vision.  Cardiovascular: Denies chest pain or irregular heart beat.  Respiratory: Denies cough or shortness of breath.  Gastrointestinal: Denies abdominal pain, nausea, or vomiting.  Musculoskeletal:  Denies muscle cramps.  Neurological: Denies dizziness or focal weakness.  Psychiatric/Behavioral: Normal mental status.  Hematologic/Lymphatic: Denies bleeding problem or easy bruising/bleeding.  Skin: Denies rash or suspicious lesions.    Examination:    Vital Signs:    Vitals:    02/02/22 1312   Resp: 18   Weight: 76.2 kg (168 lb)   Height: 5' 4" (1.626 m)   PainSc:   9       Body mass " index is 28.84 kg/m².    This a well-developed, well nourished patient in no acute distress.    Alert and oriented x 3 and cooperative to examination.       Physical Exam:     Left Shoulder Exam     Skin  Rash:   None  Scars:   None    Inspection/Observation   Swelling:   none  Erythema:   none  Bruising:   none  Wounds:   none  Scapular Winging:  none  Scapular Dyskinesia:  mild  Atrophy:   none  Masses:   None  Lymphadenopathy: None    Tenderness   AC Joint:   Mild  Bicep groove:  Mild    Range of Motion   Active Forward Flexion:  140  Active External Rotation:  30  Active Internal Rotation:  Low Back    Muscle Strength   Forward Flexion:  4+/5  External Rotation:  5/5  Internal Rotation:  5/5    Tests & Signs   Cross Arm:   negative  Hawkin's test:   positive  Impingement:   positive    Other   Sensation:  normal  Pulse:    2+ radial            Imaging:  X-rays ordered and reviewed today of the left shoulder show no acute bony abnormality.     Assessment: Left shoulder pain, unspecified chronicity  -     Large Joint Aspiration/Injection: L subacromial bursa        Plan:  She once again has some bursitis in her left shoulder so we will inject for pain relief. She has not had an MRI for years. At this point we need to see the cuff to evaluate for potential tear so we will obtain an MRI since she has pain and weakness with use.        DISCLAIMER: This note may have been dictated using voice recognition software and may contain grammatical errors.     NOTE: Consult report sent to referring provider via [email or web address] EMR.

## 2022-02-12 ENCOUNTER — HOSPITAL ENCOUNTER (OUTPATIENT)
Dept: RADIOLOGY | Facility: HOSPITAL | Age: 62
Discharge: HOME | End: 2022-02-12
Attending: ORTHOPAEDIC SURGERY
Payer: COMMERCIAL

## 2022-02-12 DIAGNOSIS — M25.512 LEFT SHOULDER PAIN, UNSPECIFIED CHRONICITY: ICD-10-CM

## 2022-02-12 PROCEDURE — 73221 MRI JOINT UPR EXTREM W/O DYE: CPT | Mod: TC,LT

## 2022-02-12 PROCEDURE — 73221 MRI JOINT UPR EXTREM W/O DYE: CPT | Mod: 26,LT,, | Performed by: RADIOLOGY

## 2022-02-12 PROCEDURE — 73221 MRI SHOULDER WITHOUT CONTRAST LEFT: ICD-10-PCS | Mod: 26,LT,, | Performed by: RADIOLOGY

## 2022-02-16 ENCOUNTER — OFFICE VISIT (OUTPATIENT)
Dept: ORTHOPEDICS | Facility: CLINIC | Age: 62
End: 2022-02-16
Payer: COMMERCIAL

## 2022-02-16 VITALS — BODY MASS INDEX: 28.68 KG/M2 | HEIGHT: 64 IN | WEIGHT: 168 LBS | RESPIRATION RATE: 18 BRPM

## 2022-02-16 DIAGNOSIS — M25.512 LEFT SHOULDER PAIN, UNSPECIFIED CHRONICITY: Primary | ICD-10-CM

## 2022-02-16 PROCEDURE — 3008F BODY MASS INDEX DOCD: CPT | Mod: CPTII,S$GLB,, | Performed by: ORTHOPAEDIC SURGERY

## 2022-02-16 PROCEDURE — 1160F RVW MEDS BY RX/DR IN RCRD: CPT | Mod: CPTII,S$GLB,, | Performed by: ORTHOPAEDIC SURGERY

## 2022-02-16 PROCEDURE — 99213 PR OFFICE/OUTPT VISIT, EST, LEVL III, 20-29 MIN: ICD-10-PCS | Mod: S$GLB,,, | Performed by: ORTHOPAEDIC SURGERY

## 2022-02-16 PROCEDURE — 99999 PR PBB SHADOW E&M-EST. PATIENT-LVL III: CPT | Mod: PBBFAC,,, | Performed by: ORTHOPAEDIC SURGERY

## 2022-02-16 PROCEDURE — 3008F PR BODY MASS INDEX (BMI) DOCUMENTED: ICD-10-PCS | Mod: CPTII,S$GLB,, | Performed by: ORTHOPAEDIC SURGERY

## 2022-02-16 PROCEDURE — 1159F PR MEDICATION LIST DOCUMENTED IN MEDICAL RECORD: ICD-10-PCS | Mod: CPTII,S$GLB,, | Performed by: ORTHOPAEDIC SURGERY

## 2022-02-16 PROCEDURE — 1159F MED LIST DOCD IN RCRD: CPT | Mod: CPTII,S$GLB,, | Performed by: ORTHOPAEDIC SURGERY

## 2022-02-16 PROCEDURE — 99999 PR PBB SHADOW E&M-EST. PATIENT-LVL III: ICD-10-PCS | Mod: PBBFAC,,, | Performed by: ORTHOPAEDIC SURGERY

## 2022-02-16 PROCEDURE — 1160F PR REVIEW ALL MEDS BY PRESCRIBER/CLIN PHARMACIST DOCUMENTED: ICD-10-PCS | Mod: CPTII,S$GLB,, | Performed by: ORTHOPAEDIC SURGERY

## 2022-02-16 PROCEDURE — 99213 OFFICE O/P EST LOW 20 MIN: CPT | Mod: S$GLB,,, | Performed by: ORTHOPAEDIC SURGERY

## 2022-02-16 NOTE — PROGRESS NOTES
Past Medical History:   Diagnosis Date    Abnormal Pap smear     Abnormal Pap smear of cervix     Anxiety     Arthritis     Cataract     Grave's disease     Grave's disease     IBS (irritable bowel syndrome)     rare    Internal hemorrhoids     followed by Dr. Schilling    Nephrolithiasis     followed by Dr. Pope    Thyroid disease     Vitamin D deficiency        Past Surgical History:   Procedure Laterality Date    CATARACT EXTRACTION  11/19/12    right eye (toric)    CATARACT EXTRACTION W/  INTRAOCULAR LENS IMPLANT  11/26/12    left eye (toric)    EPIDURAL STEROID INJECTION INTO LUMBAR SPINE N/A 10/21/2021    Procedure: INJECTION, STEROID, SPINE, LUMBAR, EPIDURAL;  Surgeon: Marshall Lane MD;  Location: [facility];  Service: Pain Management;  Laterality: N/A;  L5-S1    EXTRACORPOREAL SHOCK WAVE LITHOTRIPSY      EYE SURGERY      eyelid surgery    LITHOTRIPSY      REATTACHMENT OF MUSCLE Right 12/27/2019    Procedure: REATTACHMENT, MUSCLE;  Surgeon: Karoline Jaimes MD;  Location: [address];  Service: Ophthalmology;  Laterality: Right;    tonsillectomy      TRANSFORAMINAL EPIDURAL INJECTION OF STEROID Left 3/24/2020    Procedure: Injection,steroid,epidural,transforaminal approach;  Surgeon: Marshall Lane MD;  Location: [facility];  Service: Pain Management;  Laterality: Left;  L4-5, L5-S1    TRANSFORAMINAL EPIDURAL INJECTION OF STEROID Left 7/28/2020    Procedure: INJECTION, STEROID, EPIDURAL, TRANSFORAMINAL APPROACH;  Surgeon: Marshall Lane MD;  Location: [facility];  Service: Pain Management;  Laterality: Left;  L4-5, L5-S1  leave last on the schedule.  will not need to retest for covid.  Verify no fever/off antiinflammatories and no covid symptoms.       Current Outpatient Medications   Medication Sig    ALPRAZolam (XANAX) 0.5 MG tablet Take 1 tablet (0.5 mg total) by mouth nightly as needed.    cholecalciferol, vitamin D3, 5,000 unit Tab Take 5,000 Units by mouth every other day.    clobetasol 0.05%  (TEMOVATE) 0.05 % Oint Clobetasol propionate 0.05% once daily at night for 4 weeks, then alternate nights for 4 weeks, and then twice weekly for 4 weeks    dicyclomine (BENTYL) 20 mg tablet Take 1 tablet (20 mg total) by mouth every 8 (eight) hours as needed.    diphenhydrAMINE (BENADRYL) 25 mg capsule Take 25 mg by mouth nightly as needed for Allergies or Insomnia.    liothyronine (CYTOMEL) 5 MCG Tab TAKE 1 TABLET BY MOUTH EVERY DAY    metoprolol succinate (TOPROL-XL) 25 MG 24 hr tablet TAKE 1 TABLET BY MOUTH EVERY DAY    SYNTHROID 125 mcg tablet TAKE 1 TABLET BY MOUTH EVERY DAY FOR 6 DAYS A WEEK AND 1/2 TABLET ON DAY 7     No current facility-administered medications for this visit.       Review of patient's allergies indicates:   Allergen Reactions    Codeine Nausea And Vomiting    Sulfa (sulfonamide antibiotics) Itching       Family History   Problem Relation Age of Onset    Hypertension Father     Stroke Father     Heart disease Mother         valve    Hypertension Mother     Heart disease Brother     Hypertension Brother     Diabetes Brother     Amblyopia Neg Hx     Blindness Neg Hx     Cancer Neg Hx     Cataracts Neg Hx     Glaucoma Neg Hx     Macular degeneration Neg Hx     Retinal detachment Neg Hx     Strabismus Neg Hx     Thyroid disease Neg Hx     Anesthesia problems Neg Hx     Clotting disorder Neg Hx     Breast cancer Neg Hx     Ovarian cancer Neg Hx        Social History     Socioeconomic History    Marital status:     Number of children: 2   Occupational History    Occupation: [employer]     Employer: [employer]   Tobacco Use    Smoking status: Never Smoker    Smokeless tobacco: Never Used   Substance and Sexual Activity    Alcohol use: Yes     Comment: q month    Drug use: No    Sexual activity: Yes     Partners: Male     Birth control/protection: Post-menopausal     Social Determinants of Health     Financial Resource Strain: Low Risk      "Difficulty of Paying Living Expenses: Not hard at all   Food Insecurity: No Food Insecurity    Worried About Running Out of Food in the Last Year: Never true    Ran Out of Food in the Last Year: Never true   Transportation Needs: No Transportation Needs    Lack of Transportation (Medical): No    Lack of Transportation (Non-Medical): No   Physical Activity: Insufficiently Active    Days of Exercise per Week: 3 days    Minutes of Exercise per Session: 30 min   Stress: No Stress Concern Present    Feeling of Stress : Not at all   Social Connections: Unknown    Frequency of Communication with Friends and Family: Twice a week    Frequency of Social Gatherings with Friends and Family: Once a week    Active Member of Clubs or Organizations: Yes    Attends Club or Organization Meetings: More than 4 times per year    Marital Status:        Chief Complaint:   Chief Complaint   Patient presents with    Follow-up     L shoulder MRI review       Consulting Physician: No ref. provider found    History of present illness:    This is a 61 y.o. female who complains of left shoulder pain.  She denies any injury. Has had this for years. Pain 8/10 and worse after sleeping on shoulder. The previous injection we gave her worked well.    Review of Systems:    Constitution: Denies chills, fever, and sweats.  HENT: Denies headaches or blurry vision.  Cardiovascular: Denies chest pain or irregular heart beat.  Respiratory: Denies cough or shortness of breath.  Gastrointestinal: Denies abdominal pain, nausea, or vomiting.  Musculoskeletal:  Denies muscle cramps.  Neurological: Denies dizziness or focal weakness.  Psychiatric/Behavioral: Normal mental status.  Hematologic/Lymphatic: Denies bleeding problem or easy bruising/bleeding.  Skin: Denies rash or suspicious lesions.    Examination:    Vital Signs:    Vitals:    02/16/22 0908   Resp: 18   Weight: 76.2 kg (168 lb)   Height: 5' 4" (1.626 m)       Body mass index is " 28.84 kg/m².    This a well-developed, well nourished patient in no acute distress.    Alert and oriented x 3 and cooperative to examination.       Physical Exam:     Left Shoulder Exam     Skin  Rash:   None  Scars:   None    Inspection/Observation   Swelling:   none  Erythema:   none  Bruising:   none  Wounds:   none  Scapular Winging:  none  Scapular Dyskinesia:  mild  Atrophy:   none  Masses:   None  Lymphadenopathy: None    Tenderness   AC Joint:   Mild  Bicep groove:  Mild    Range of Motion   Active Forward Flexion:  160  Active External Rotation:  30  Active Internal Rotation:  Low Back    Muscle Strength   Forward Flexion:  4+/5  External Rotation:  5/5  Internal Rotation:  5/5    Tests & Signs   Cross Arm:   negative  Hawkin's test:   positive  Impingement:   positive    Other   Sensation:  normal  Pulse:    2+ radial            Imaging:  X-rays of the left shoulder show no acute bony abnormality. The MRI study images that were interpreted personally by me today and reviewed with the patient demonstrate a small partial-thickness tear of the supraspinatus.       Assessment: Left shoulder pain, unspecified chronicity        Plan:  MRI confirms a small tear.  We discussed treatment options.  The injection worked well for her until she slept on it and now with bothering her little bit.  She has decided that she would like to go back to physical therapy and work on this again.  She declined an injection today.  Will follow up with her as needed.      DISCLAIMER: This note may have been dictated using voice recognition software and may contain grammatical errors.     NOTE: Consult report sent to referring provider via [employer] EMR.

## 2022-03-04 ENCOUNTER — CLINICAL SUPPORT (OUTPATIENT)
Dept: REHABILITATION | Facility: HOSPITAL | Age: 62
End: 2022-03-04
Attending: ORTHOPAEDIC SURGERY
Payer: COMMERCIAL

## 2022-03-04 DIAGNOSIS — M25.512 LEFT SHOULDER PAIN, UNSPECIFIED CHRONICITY: ICD-10-CM

## 2022-03-04 PROCEDURE — 97161 PT EVAL LOW COMPLEX 20 MIN: CPT | Mod: PN

## 2022-03-04 PROCEDURE — 97110 THERAPEUTIC EXERCISES: CPT | Mod: PN

## 2022-03-04 NOTE — PLAN OF CARE
[facility]   Physical Therapy Initial Evaluation     Date: 3/4/2022   Name: Lexi Chaney  Clinic Number: [id number]    Therapy Diagnosis:   Encounter Diagnosis   Name Primary?    Left shoulder pain, unspecified chronicity      Physician: Danny Harmon MD    Physician Orders: PT Eval and Treat   Medical Diagnosis from Referral: Left shoulder pain  Evaluation Date: 3/4/2022  Authorization Period Expiration: 4/1/22  Plan of Care Expiration: 5/13/22  Progress Note Due: 4/3/22  Visit # / Visits authorized: 1/ 1   FOTO: 56/100    Precautions: Standard     Time In: 1505  Time Out: 1550  Total Appointment Time (timed & untimed codes): 50 minutes      SUBJECTIVE     Date of onset: Insidious    History of current condition - Lexi reports: pain in left shoulder started ~ 20 yrs ago without trauma after she painted her entire house.Gradually getting worse.She reports difficulties with overhead tasks and sleeping on left shoulder.    Falls: No    Imaging, MRI studies:   FINDINGS:  Bone: No acute fracture or avascular necrosis or marrow replacement.     Glenohumeral joint:No malalignment or effusion.  There are areas of partial thickness chondral loss along the central glenoid and inferomedial humeral head.     Acromioclavicular joint:Mild degenerative change.  Trace fluid in the subacromial subdeltoid bursa.     Supraspinatus:Partial-thickness bursal sided defect in the mid fibers just proximal to the insertion.  Normal muscle volume.     Infraspinatus:Mild insertional tendinosis.  Normal muscle volume.     Subscapularis:Unremarkable.     Teres minor:Unremarkable.     Biceps: Long head biceps tendon is unremarkable.     Labrum: Assessment limited without intra-articular distension.  Suspect anatomic variation in the anterosuperior labrum.  No discrete tear.     Soft tissues: No mass or focal fluid.     Miscellaneous:     Impression:     1. Low-grade partial-thickness bursal sided tear or  fraying in the mid supraspinatus, just proximal to the insertion.  2. Mild insertional tendinosis of infraspinatus.  3. Mild degenerative change AC and glenohumeral joints.     Prior Therapy: OT in 2018 with help  Social History: In 1 unique house lives with their spouse  Occupation:  working from home  Prior Level of Function: Independent.  Current Level of Function: Mo    Pain:  Current 0/10, worst 9/10, best 0/10   Location: left shoulder    Description: Aching, Throbbing, Sharp and Variable  Aggravating Factors: Bending, Night Time, Extension, Flexing and Lifting  Easing Factors: pain medication, ice and rest    Patients goals: to able to sleep on left side.     Medical History:   Past Medical History:   Diagnosis Date    Abnormal Pap smear     Abnormal Pap smear of cervix     Anxiety     Arthritis     Cataract     Grave's disease     Grave's disease     IBS (irritable bowel syndrome)     rare    Internal hemorrhoids     followed by Dr. Schilling    Nephrolithiasis     followed by Dr. Pope    Thyroid disease     Vitamin D deficiency        Surgical History:   Lexi Chaney  has a past surgical history that includes Eye surgery; tonsillectomy; Extracorporeal shock wave lithotripsy; Cataract extraction (11/19/12); Cataract extraction w/  intraocular lens implant (11/26/12); Lithotripsy; Reattachment of muscle (Right, 12/27/2019); Transforaminal epidural injection of steroid (Left, 3/24/2020); Transforaminal epidural injection of steroid (Left, 7/28/2020); and Epidural steroid injection into lumbar spine (N/A, 10/21/2021).    Medications:   Lexi has a current medication list which includes the following prescription(s): alprazolam, cholecalciferol (vitamin d3), clobetasol 0.05%, dicyclomine, diphenhydramine, liothyronine, metoprolol succinate, and synthroid.    Allergies:   Review of patient's allergies indicates:   Allergen Reactions    Sulfa (sulfonamide antibiotics) Itching and Other (See  Comments)     Hyperventilation          OBJECTIVE       Shoulder  Right   Left  Pain/Dysfunction with Movement    AROM PROM MMT AROM PROM MMT    flexion    150 165 3-    extension    60 65 3-    abduction    130 155 3-    adduction          Internal rotation    65 70 3    ER at 90° abd    80 85 3-    ER at 0° abd            Posture;head forward,left shoulder elevated,scoliosis.  Special tests;impingement;positive, Tao test;pos..Liftoff test;neg.Reach back 0 cm.   (2)  Rt;50, LT 35#, pt is right handed.  Palpation;lateral aspect of left shoulder joint tender.     Limitation/Restriction for FOTO shoulder Survey    Therapist reviewed FOTO scores for Lexi Chaney on 3/4/2022.   FOTO documents entered into [facility] - see Media section.    Limitation Score: 44%         TREATMENT     Total Treatment time (time-based codes) separate from Evaluation: 10 minutes      Lexi received the treatments listed below:      therapeutic exercises to develop ROM, flexibility and posture for 10 minutes including:  OH pulley 4', Wand;flex/ext/abd 10 each.  Wall wipe FL/CW/CCW 10.  PROM 3'.  PATIENT EDUCATION AND HOME EXERCISES     Education provided:   - Role of PT.POC.    ASSESSMENT     Lexi is a 61 y.o. female referred to outpatient Physical Therapy with a medical diagnosis of left shoulder pain. Patient presents with ROM and strength deficits,poor posture,decreased function due to pain and above listed deficits.    Patient prognosis is Good.   Patientt will benefit from skilled outpatient Physical Therapy to address the deficits stated above and in the chart below, provide patient /family education, and to maximize patientt's level of independence.     Plan of care discussed with patient: Yes  Patient's spiritual, cultural and educational needs considered and patient is agreeable to the plan of care and goals as stated below:     Anticipated Barriers for therapy: pt can only make it 1 x wk.    Medical Necessity is demonstrated  by the following   History  Co-morbidities and personal factors that may impact the plan of care Co-morbidities:   high BMI    Personal Factors:   no deficits     low   Examination  Body Structures and Functions, activity limitations and participation restrictions that may impact the plan of care Body Regions:   upper extremities    Body Systems:    gross symmetry  ROM  strength    Participation Restrictions:   1xwk per pt.    Activity limitations:   Learning and applying knowledge  no deficits    General Tasks and Commands  no deficits    Communication  no deficits    Mobility  no deficits    Self care  no deficits    Domestic Life  no deficits    Interactions/Relationships  no deficits    Life Areas  no deficits    Community and Social Life  no deficits         low   Clinical Presentation stable and uncomplicated low   Decision Making/ Complexity Score: low     Goals:  Short Term Goals (STG) # weeks Goal Review Date Reviewed Date Met   Pt will demonstrate independence with initial HEP to facilitate therex progression and improvements in functional mobility 4 Initial 3/4/2022    The patient will increase AROM FL and ABD in order for pt to don and doff belt/bra with more ease 4 Initial 3/4/2022       3/4/2022    The patient will increase right upper extremity strength to greater than or equal to 1/3 manual muscle grade for all deficient areas in order to facilitate placing object on overhead shelf 4 Initial 3/4/2022       3/4/2022       3/4/2022       3/4/2022       3/4/2022       3/4/2022       3/4/2022      Long Term Goals (STG) # weeks Goal Review Date Reviewed Date Met   The patient will improve the Upper Extremity Functional Scale to less than 35% Disability 8 Initial 3/4/2022    The patient will demonstrate increased right shoulder flexion active range of motion to greater than 160 degrees in order to improve the patient's ability to reach into overhead cabinets at home 8 Initial 3/4/2022    The patient will  increase right upper extremity strength to greater than or equal to 1 manual muscle grade for all deficient areas in order to transfer food trays from counter to refrigerator. 8 Initial 3/4/2022    Pt will be able to sleep on left side 8 Initial 3/4/2022       3/4/2022       3/4/2022       3/4/2022       3/4/2022       3/4/2022       3/4/2022        PLAN   Plan of care Certification: 3/4/2022 to 5/13/22.    Outpatient Physical Therapy 1 times weekly for 8 weeks to include the following interventions: Electrical Stimulation PRN, Manual Therapy, Moist Heat/ Ice, Patient Education, Therapeutic Activities, Therapeutic Exercise, Ultrasound and dry needling PRN.IMS PRN..     Ronn Batista, PT      I CERTIFY THE NEED FOR THESE SERVICES FURNISHED UNDER THIS PLAN OF TREATMENT AND WHILE UNDER MY CARE   Physician's comments:     Physician's Signature: ___________________________________________________

## 2022-03-07 ENCOUNTER — PATIENT MESSAGE (OUTPATIENT)
Dept: FAMILY MEDICINE | Facility: CLINIC | Age: 62
End: 2022-03-07
Payer: COMMERCIAL

## 2022-03-07 ENCOUNTER — OFFICE VISIT (OUTPATIENT)
Dept: URGENT CARE | Facility: CLINIC | Age: 62
End: 2022-03-07
Payer: COMMERCIAL

## 2022-03-07 VITALS
SYSTOLIC BLOOD PRESSURE: 102 MMHG | TEMPERATURE: 99 F | RESPIRATION RATE: 16 BRPM | HEART RATE: 75 BPM | BODY MASS INDEX: 28.75 KG/M2 | HEIGHT: 64 IN | DIASTOLIC BLOOD PRESSURE: 59 MMHG | OXYGEN SATURATION: 96 % | WEIGHT: 168.38 LBS

## 2022-03-07 DIAGNOSIS — R09.81 NASAL CONGESTION: ICD-10-CM

## 2022-03-07 DIAGNOSIS — R05.9 COUGH: ICD-10-CM

## 2022-03-07 DIAGNOSIS — U07.1 COVID-19 VIRUS DETECTED: Primary | ICD-10-CM

## 2022-03-07 DIAGNOSIS — J02.9 SORE THROAT: ICD-10-CM

## 2022-03-07 LAB
CTP QC/QA: YES
CTP QC/QA: YES
FLUAV AG NPH QL: NEGATIVE
FLUBV AG NPH QL: NEGATIVE
SARS-COV-2 AG RESP QL IA.RAPID: POSITIVE

## 2022-03-07 PROCEDURE — 87804 INFLUENZA ASSAY W/OPTIC: CPT | Mod: QW,,, | Performed by: NURSE PRACTITIONER

## 2022-03-07 PROCEDURE — 99204 OFFICE O/P NEW MOD 45 MIN: CPT | Mod: S$GLB,,, | Performed by: NURSE PRACTITIONER

## 2022-03-07 PROCEDURE — 1159F MED LIST DOCD IN RCRD: CPT | Mod: CPTII,S$GLB,, | Performed by: NURSE PRACTITIONER

## 2022-03-07 PROCEDURE — 3074F PR MOST RECENT SYSTOLIC BLOOD PRESSURE < 130 MM HG: ICD-10-PCS | Mod: CPTII,S$GLB,, | Performed by: NURSE PRACTITIONER

## 2022-03-07 PROCEDURE — 3078F PR MOST RECENT DIASTOLIC BLOOD PRESSURE < 80 MM HG: ICD-10-PCS | Mod: CPTII,S$GLB,, | Performed by: NURSE PRACTITIONER

## 2022-03-07 PROCEDURE — 3074F SYST BP LT 130 MM HG: CPT | Mod: CPTII,S$GLB,, | Performed by: NURSE PRACTITIONER

## 2022-03-07 PROCEDURE — 87811 SARS CORONAVIRUS 2 ANTIGEN POCT, MANUAL READ: ICD-10-PCS | Mod: S$GLB,,, | Performed by: NURSE PRACTITIONER

## 2022-03-07 PROCEDURE — 3008F PR BODY MASS INDEX (BMI) DOCUMENTED: ICD-10-PCS | Mod: CPTII,S$GLB,, | Performed by: NURSE PRACTITIONER

## 2022-03-07 PROCEDURE — 1160F PR REVIEW ALL MEDS BY PRESCRIBER/CLIN PHARMACIST DOCUMENTED: ICD-10-PCS | Mod: CPTII,S$GLB,, | Performed by: NURSE PRACTITIONER

## 2022-03-07 PROCEDURE — 87811 SARS-COV-2 COVID19 W/OPTIC: CPT | Mod: S$GLB,,, | Performed by: NURSE PRACTITIONER

## 2022-03-07 PROCEDURE — 3008F BODY MASS INDEX DOCD: CPT | Mod: CPTII,S$GLB,, | Performed by: NURSE PRACTITIONER

## 2022-03-07 PROCEDURE — 99204 PR OFFICE/OUTPT VISIT, NEW, LEVL IV, 45-59 MIN: ICD-10-PCS | Mod: S$GLB,,, | Performed by: NURSE PRACTITIONER

## 2022-03-07 PROCEDURE — 1159F PR MEDICATION LIST DOCUMENTED IN MEDICAL RECORD: ICD-10-PCS | Mod: CPTII,S$GLB,, | Performed by: NURSE PRACTITIONER

## 2022-03-07 PROCEDURE — 87804 POCT INFLUENZA A/B: ICD-10-PCS | Mod: QW,,, | Performed by: NURSE PRACTITIONER

## 2022-03-07 PROCEDURE — 3078F DIAST BP <80 MM HG: CPT | Mod: CPTII,S$GLB,, | Performed by: NURSE PRACTITIONER

## 2022-03-07 PROCEDURE — 1160F RVW MEDS BY RX/DR IN RCRD: CPT | Mod: CPTII,S$GLB,, | Performed by: NURSE PRACTITIONER

## 2022-03-07 RX ORDER — ALBUTEROL SULFATE 90 UG/1
2 AEROSOL, METERED RESPIRATORY (INHALATION) EVERY 6 HOURS PRN
Qty: 18 G | Refills: 0 | Status: SHIPPED | OUTPATIENT
Start: 2022-03-07 | End: 2022-05-11

## 2022-03-07 RX ORDER — PROMETHAZINE HYDROCHLORIDE AND DEXTROMETHORPHAN HYDROBROMIDE 6.25; 15 MG/5ML; MG/5ML
5 SYRUP ORAL EVERY 4 HOURS PRN
Qty: 118 ML | Refills: 0 | Status: SHIPPED | OUTPATIENT
Start: 2022-03-07 | End: 2022-03-17

## 2022-03-07 RX ORDER — BENZONATATE 100 MG/1
100 CAPSULE ORAL 3 TIMES DAILY PRN
Qty: 30 CAPSULE | Refills: 0 | Status: SHIPPED | OUTPATIENT
Start: 2022-03-07 | End: 2022-03-17

## 2022-03-07 RX ORDER — FLUTICASONE PROPIONATE 50 MCG
1 SPRAY, SUSPENSION (ML) NASAL DAILY
Qty: 15.8 ML | Refills: 0 | Status: SHIPPED | OUTPATIENT
Start: 2022-03-07 | End: 2022-05-11

## 2022-03-07 NOTE — PROGRESS NOTES
"Subjective:       Patient ID: Lexi Chaney is a 61 y.o. female.    Vitals:  height is 5' 4" (1.626 m) and weight is 76.4 kg (168 lb 6.4 oz). Her oral temperature is 98.9 °F (37.2 °C). Her blood pressure is 102/59 (abnormal) and her pulse is 75. Her respiration is 16 and oxygen saturation is 96%.     Chief Complaint: Sinus Problem    Sinus Problem  This is a new problem. The current episode started in the past 7 days. The problem has been gradually worsening since onset. Associated symptoms include congestion, headaches, a hoarse voice, sinus pressure, sneezing and a sore throat. (Post nasal drip) Past treatments include acetaminophen (Mucinex, Benadryl, Advil). The treatment provided no relief.       HENT: Positive for congestion, sinus pressure and sore throat.    Allergic/Immunologic: Positive for sneezing.   Neurological: Positive for headaches.       Objective:      Physical Exam   Constitutional: She is oriented to person, place, and time.   HENT:   Head: Normocephalic and atraumatic.   Ears:   Right Ear: Tympanic membrane normal.   Left Ear: Tympanic membrane normal.   Nose: Congestion present.   Mouth/Throat: Posterior oropharyngeal erythema present.   Eyes: Conjunctivae are normal.   Neck: Neck supple.   Cardiovascular: Normal rate, regular rhythm, normal heart sounds and normal pulses.   Pulmonary/Chest: Effort normal and breath sounds normal.   Abdominal: Normal appearance and bowel sounds are normal.   Musculoskeletal: Normal range of motion.         General: Normal range of motion.   Neurological: She is alert and oriented to person, place, and time.   Skin: Skin is warm and dry. Capillary refill takes 2 to 3 seconds.   Psychiatric: Her behavior is normal. Mood normal.   Nursing note and vitals reviewed.        Assessment:       1. COVID-19 virus detected    2. Nasal congestion    3. Sore throat    4. Cough      Covid antigen Positive    Influenza A/B Negative    Plan:         COVID-19 virus " detected  -     fluticasone propionate (FLONASE) 50 mcg/actuation nasal spray; 1 spray (50 mcg total) by Each Nostril route once daily.  Dispense: 15.8 mL; Refill: 0  -     albuterol (VENTOLIN HFA) 90 mcg/actuation inhaler; Inhale 2 puffs into the lungs every 6 (six) hours as needed for Wheezing. Rescue  Dispense: 18 g; Refill: 0    Nasal congestion  -     SARS Coronavirus 2 Antigen, POCT Manual Read  -     POCT Influenza A/B    Sore throat    Cough  -     promethazine-dextromethorphan (PROMETHAZINE-DM) 6.25-15 mg/5 mL Syrp; Take 5 mLs by mouth every 4 (four) hours as needed (cough).  Dispense: 118 mL; Refill: 0  -     benzonatate (TESSALON) 100 MG capsule; Take 1 capsule (100 mg total) by mouth 3 (three) times daily as needed for Cough.  Dispense: 30 capsule; Refill: 0        Symptomatic treatment to include:    Rest, increase fluid intake to include electrolyte replacement  Ibuprofen/Tylenol as directed for fever, sore throat, body aches  Zrytec and flonase for sinus symptoms  Phenergan cough syrup at night for cough  Tessalon perles cough pills as needed day or night  Mucinex D over the counter as directed for sinus congestion.  Coricidin HBP if you have high blood pressure.  Zofran as directed for nausea/vomiting.  Warm, salt water gargles, over the counter throat lozenges or sprays as desires.   Imodium over the counter as directed for diarrhea.  ER for difficulty breathing not relieved by rest, excessive lethargy and/or change in mental status  Albuterol inhaler (if prescribed) for chest tightness, shortness or breath, wheezing, or tight/wheezing cough especially when brought on with deep breath.  Follow CDC isolation guidelines as provided  Patient Instructions   POSITIVE COVID TEST      You have tested positive for COVID-19 today.  Please note that patients who test positive for COVID-19 are required by the CDC to undergo isolation for 5 days, then wearing a mask around others for an additional 5 days, after  their symptoms first began following the new updated guidelines of 12/27/2021. This isolation starts from the day you first developed symptoms, not the day of your positive test. For example, if your symptoms began on a Monday but tested positive on the following Wednesday, your 5-day isolation begins from that Monday, not the Wednesday you tested positive.  However, if you are asymptomatic (a person who does not have any symptoms) and COVID-19 positive, your 5-day isolation begins on the day you tested positive, regardless of exposure date.  Also, per the CDC guidelines, once your 5 days have passed, symptoms have resolved or are improving, and you have not had fever greater than 100.4F in the last 24 hours without taking any fever reducers such as Tylenol (Acetaminophen) or Motrin (Ibuprofen), you may return to your normal activities including social distancing, wearing masks, and frequent handwashing - YOU DO NOT NEED ANOTHER TEST IN ORDER TO END YOUR QUARANTINE.

## 2022-03-07 NOTE — PATIENT INSTRUCTIONS
Symptomatic treatment to include:    Rest, increase fluid intake to include electrolyte replacement  Ibuprofen/Tylenol as directed for fever, sore throat, body aches  Zrytec and flonase for sinus symptoms  Phenergan cough syrup at night for cough  Tessalon perles cough pills as needed day or night  Mucinex D over the counter as directed for sinus congestion.  Coricidin HBP if you have high blood pressure.  Zofran as directed for nausea/vomiting.  Warm, salt water gargles, over the counter throat lozenges or sprays as desires.   Imodium over the counter as directed for diarrhea.  ER for difficulty breathing not relieved by rest, excessive lethargy and/or change in mental status  Albuterol inhaler (if prescribed) for chest tightness, shortness or breath, wheezing, or tight/wheezing cough especially when brought on with deep breath.  Follow CDC isolation guidelines as provided  Patient Instructions   POSITIVE COVID TEST      You have tested positive for COVID-19 today.  Please note that patients who test positive for COVID-19 are required by the CDC to undergo isolation for 5 days, then wearing a mask around others for an additional 5 days, after their symptoms first began following the new updated guidelines of 12/27/2021. This isolation starts from the day you first developed symptoms, not the day of your positive test. For example, if your symptoms began on a Monday but tested positive on the following Wednesday, your 5-day isolation begins from that Monday, not the Wednesday you tested positive.  However, if you are asymptomatic (a person who does not have any symptoms) and COVID-19 positive, your 5-day isolation begins on the day you tested positive, regardless of exposure date.  Also, per the CDC guidelines, once your 5 days have passed, symptoms have resolved or are improving, and you have not had fever greater than 100.4F in the last 24 hours without taking any fever reducers such as Tylenol (Acetaminophen) or  Motrin (Ibuprofen), you may return to your normal activities including social distancing, wearing masks, and frequent handwashing - YOU DO NOT NEED ANOTHER TEST IN ORDER TO END YOUR QUARANTINE.

## 2022-03-08 RX ORDER — PROMETHAZINE HYDROCHLORIDE 25 MG/1
25 TABLET ORAL EVERY 4 HOURS
Qty: 30 TABLET | Refills: 0 | Status: SHIPPED | OUTPATIENT
Start: 2022-03-08

## 2022-03-16 DIAGNOSIS — Z12.31 OTHER SCREENING MAMMOGRAM: ICD-10-CM

## 2022-03-17 ENCOUNTER — CLINICAL SUPPORT (OUTPATIENT)
Dept: REHABILITATION | Facility: HOSPITAL | Age: 62
End: 2022-03-17
Attending: ORTHOPAEDIC SURGERY
Payer: COMMERCIAL

## 2022-03-17 DIAGNOSIS — R29.898 SHOULDER WEAKNESS: Primary | ICD-10-CM

## 2022-03-17 PROCEDURE — 97110 THERAPEUTIC EXERCISES: CPT | Mod: PN

## 2022-03-17 NOTE — PROGRESS NOTES
[facility]   Physical Therapy Treatment Note     Name: Lexi Chaney  Clinic Number: [id number]    Therapy Diagnosis:   Encounter Diagnosis   Name Primary?    Shoulder weakness Yes     Physician: Danny Harmon MD    Visit Date: 3/17/2022    [copy and paste header from eval here]     Physician Orders: PT Eval and Treat   Medical Diagnosis from Referral: Left shoulder pain  Evaluation Date: 3/4/2022  Authorization Period Expiration: 4/1/22  Plan of Care Expiration: 5/13/22  Progress Note Due: 4/3/22  Visit # / Visits authorized: 2/ 1   FOTO: 56/100     Precautions: Standard      PTA Visit #: 0/5     Time In: 1502  Time Out: 1547  Total Billable Time: 45 minutes    SUBJECTIVE     Pt reports: she was diagnosed with covid 3/7/22.She is OK now.  She was compliant with home exercise program.  Response to previous treatment: sore  Functional change: none reported    Pain: 0/10  Location: left shoulder      OBJECTIVE     Objective Measures updated at progress report unless specified.     Treatment     Lexi received the treatments listed below:      therapeutic exercises to develop strength, endurance, ROM, flexibility and posture for 40 minutes including:  UBE 5/5', OH pulley FLEX, ABD 3/3'  Shoulder shrugs/rolls/retr 30  YTB B ER 30.  RTB  Shoulder EXT/ROWING 30/30  WAND; FL/EXT/ABD 30 each  PROM EX all planes 5'  manual therapy techniques: dry needling were applied to the: 0 for 0 minutes, including:    supervised modalities after being cleared for contradictions: TENS:  Lexi received TENS electrical stimulation for pain to the . Pt received continuous mode at a rate of 2 pps for 0 minutes. Lexi tolerated treatment well without any adverse effects.       Patient Education and Home Exercises     Home Exercises Provided and Patient Education Provided     Education provided:   - Cont ROM    ASSESSMENT     Performed ex well    Lexi Is progressing well towards her goals.   Pt prognosis is  Good.     Pt will continue to benefit from skilled outpatient physical therapy to address the deficits listed in the problem list box on initial evaluation, provide pt/family education and to maximize pt's level of independence in the home and community environment.     Pt's spiritual, cultural and educational needs considered and pt agreeable to plan of care and goals.     Anticipated barriers to physical therapy: no    Goals:   Short Term Goals (STG) # weeks Goal Review Date Reviewed Date Met   Pt will demonstrate independence with initial HEP to facilitate therex progression and improvements in functional mobility 4 Initial 3/4/2022     The patient will increase AROM FL and ABD in order for pt to don and doff belt/bra with more ease 4 Initial 3/4/2022           3/4/2022     The patient will increase right upper extremity strength to greater than or equal to 1/3 manual muscle grade for all deficient areas in order to facilitate placing object on overhead shelf 4 Initial 3/4/2022           3/4/2022           3/4/2022           3/4/2022           3/4/2022           3/4/2022           3/4/2022        Long Term Goals (STG) # weeks Goal Review Date Reviewed Date Met   The patient will improve the Upper Extremity Functional Scale to less than 35% Disability 8 Initial 3/4/2022     The patient will demonstrate increased right shoulder flexion active range of motion to greater than 160 degrees in order to improve the patient's ability to reach into overhead cabinets at home 8 Initial 3/4/2022     The patient will increase right upper extremity strength to greater than or equal to 1 manual muscle grade for all deficient areas in order to transfer food trays from counter to refrigerator. 8 Initial 3/4/2022     Pt will be able to sleep on left side 8 Initial 3/4/2022           3/4/2022           3/4/2022                 PLAN     Per POC.    Ronn Batista, PT

## 2022-03-23 ENCOUNTER — PATIENT MESSAGE (OUTPATIENT)
Dept: ADMINISTRATIVE | Facility: HOSPITAL | Age: 62
End: 2022-03-23
Payer: COMMERCIAL

## 2022-03-23 ENCOUNTER — PATIENT OUTREACH (OUTPATIENT)
Dept: ADMINISTRATIVE | Facility: HOSPITAL | Age: 62
End: 2022-03-23
Payer: COMMERCIAL

## 2022-03-24 ENCOUNTER — CLINICAL SUPPORT (OUTPATIENT)
Dept: REHABILITATION | Facility: HOSPITAL | Age: 62
End: 2022-03-24
Attending: ORTHOPAEDIC SURGERY
Payer: COMMERCIAL

## 2022-03-24 DIAGNOSIS — R29.898 SHOULDER WEAKNESS: Primary | ICD-10-CM

## 2022-03-24 PROCEDURE — 97110 THERAPEUTIC EXERCISES: CPT | Mod: PN

## 2022-03-24 NOTE — PROGRESS NOTES
[facility]   Physical Therapy Treatment Note     Name: Lexi Chaney  Clinic Number: [id number]    Therapy Diagnosis:   Encounter Diagnosis   Name Primary?    Shoulder weakness Yes     Physician: Danny Harmon MD    Visit Date: 3/24/2022    [copy and paste header from eval here]     Physician Orders: PT Eval and Treat   Medical Diagnosis from Referral: Left shoulder pain  Evaluation Date: 3/4/2022  Authorization Period Expiration: 4/1/22  Plan of Care Expiration: 5/13/22  Progress Note Due: 4/3/22  Visit # / Visits authorized: 3/20  FOTO: 56/100     Precautions: Standard      PTA Visit #: 0/5     Time In: 1500  Time Out: 1540  Total Billable Time: 40 minutes    SUBJECTIVE     Pt reports:no pain  She was compliant with home exercise program.  Response to previous treatment: feeling good  Functional change: improving    Pain: 0/10  Location: left shoulder      OBJECTIVE     Objective Measures updated at progress report unless specified.     Treatment     Lexi received the treatments listed below:      therapeutic exercises to develop strength, endurance, ROM, flexibility and posture for 40 minutes including:  UBE 5/5', OH pulley FLEX, ABD 3/3'  Shoulder shrugs/rolls/retr 30  YTB B ER 30.   RTB  Shoulder EXT/ROWING 30/30  WAND; FL/EXT/ABD 30 each  PROM EX all planes 5'  manual therapy techniques: dry needling were applied to the: 0 for 0 minutes, including:    supervised modalities after being cleared for contradictions: TENS:  Lexi received TENS electrical stimulation for pain to the . Pt received continuous mode at a rate of 2 pps for 0 minutes. Lexi tolerated treatment well without any adverse effects.     Patient Education and Home Exercises     Home Exercises Provided and Patient Education Provided     Education provided:   - Cont ROM    ASSESSMENT     Performed ex well    Lexi Is progressing well towards her goals.   Pt prognosis is Good.     Pt will continue to benefit from  skilled outpatient physical therapy to address the deficits listed in the problem list box on initial evaluation, provide pt/family education and to maximize pt's level of independence in the home and community environment.     Pt's spiritual, cultural and educational needs considered and pt agreeable to plan of care and goals.     Anticipated barriers to physical therapy: no    Goals:   Short Term Goals (STG) # weeks Goal Review Date Reviewed Date Met   Pt will demonstrate independence with initial HEP to facilitate therex progression and improvements in functional mobility 4 Initial 3/4/2022     The patient will increase AROM FL and ABD in order for pt to don and doff belt/bra with more ease 4 Initial 3/4/2022           3/4/2022     The patient will increase right upper extremity strength to greater than or equal to 1/3 manual muscle grade for all deficient areas in order to facilitate placing object on overhead shelf 4 Initial 3/4/2022           3/4/2022           3/4/2022           3/4/2022           3/4/2022           3/4/2022           3/4/2022        Long Term Goals (STG) # weeks Goal Review Date Reviewed Date Met   The patient will improve the Upper Extremity Functional Scale to less than 35% Disability 8 Initial 3/4/2022     The patient will demonstrate increased right shoulder flexion active range of motion to greater than 160 degrees in order to improve the patient's ability to reach into overhead cabinets at home 8 Initial 3/4/2022     The patient will increase right upper extremity strength to greater than or equal to 1 manual muscle grade for all deficient areas in order to transfer food trays from counter to refrigerator. 8 Initial 3/4/2022     Pt will be able to sleep on left side 8 Initial 3/4/2022           3/4/2022           3/4/2022                 PLAN     Per POC.    Ronn Batista, PT

## 2022-04-06 ENCOUNTER — TELEPHONE (OUTPATIENT)
Dept: OPHTHALMOLOGY | Facility: CLINIC | Age: 62
End: 2022-04-06
Payer: COMMERCIAL

## 2022-04-06 NOTE — TELEPHONE ENCOUNTER
----- Message from Bridgette Cramer sent at 4/6/2022 10:52 AM CDT -----  Contact: self  Patient has gravies disease and would like to make an appt with the doctor in [place], call back at [phone] (home) and thanks

## 2022-04-07 ENCOUNTER — CLINICAL SUPPORT (OUTPATIENT)
Dept: REHABILITATION | Facility: HOSPITAL | Age: 62
End: 2022-04-07
Attending: ORTHOPAEDIC SURGERY
Payer: COMMERCIAL

## 2022-04-07 DIAGNOSIS — R29.898 SHOULDER WEAKNESS: Primary | ICD-10-CM

## 2022-04-07 PROCEDURE — 97110 THERAPEUTIC EXERCISES: CPT | Mod: PN

## 2022-04-07 NOTE — PROGRESS NOTES
[facility]   Physical Therapy Treatment Note     Name: Lexi Chaney  Clinic Number: [id number]    Therapy Diagnosis:   Encounter Diagnosis   Name Primary?    Shoulder weakness Yes     Physician: Danny Harmon MD    Visit Date: 4/7/2022    [copy and paste header from eval here]     Physician Orders: PT Eval and Treat   Medical Diagnosis from Referral: Left shoulder pain  Evaluation Date: 3/4/2022  Authorization Period Expiration: 4/1/22  Plan of Care Expiration: 5/13/22  Progress Note Due: 4/3/22  Visit # / Visits authorized: 4/20  FOTO: 56/100     Precautions: Standard      PTA Visit #: 0/5     Time In: 1500  Time Out: 1540  Total Billable Time: 40 minutes    SUBJECTIVE     Pt reports:no pain  She was compliant with home exercise program.  Response to previous treatment: feeling good  Functional change: improving    Pain: 0/10  Location: left shoulder      OBJECTIVE     Objective Measures updated at progress report unless specified.     Treatment     Lexi received the treatments listed below:      therapeutic exercises to develop strength, endurance, ROM, flexibility and posture for 40 minutes including:  UBE 5/5', OH pulley FLEX, ABD 3/3'  Shoulder shrugs/rolls/retr 30  YTB B ER 30.   CC;3#  Shoulder EXT/ROWING 30/30  WAND 1#; FL/EXT/ABD 30 each  PROM EX all planes 5'  manual therapy techniques: dry needling were applied to the: 0 for 0 minutes, including:    supervised modalities after being cleared for contradictions: TENS:  Lexi received TENS electrical stimulation for pain to the . Pt received continuous mode at a rate of 2 pps for 0 minutes. Lexi tolerated treatment well without any adverse effects.     Patient Education and Home Exercises     Home Exercises Provided and Patient Education Provided     Education provided:   - Cont ROM    ASSESSMENT     Performed ex well    Lexi Is progressing well towards her goals.   Pt prognosis is Good.     Pt will continue to benefit  from skilled outpatient physical therapy to address the deficits listed in the problem list box on initial evaluation, provide pt/family education and to maximize pt's level of independence in the home and community environment.     Pt's spiritual, cultural and educational needs considered and pt agreeable to plan of care and goals.     Anticipated barriers to physical therapy: no    Goals:   Short Term Goals (STG) # weeks Goal Review Date Reviewed Date Met   Pt will demonstrate independence with initial HEP to facilitate therex progression and improvements in functional mobility 4 Initial 3/4/2022     The patient will increase AROM FL and ABD in order for pt to don and doff belt/bra with more ease 4 Initial 3/4/2022           3/4/2022     The patient will increase right upper extremity strength to greater than or equal to 1/3 manual muscle grade for all deficient areas in order to facilitate placing object on overhead shelf 4 Initial 3/4/2022           3/4/2022           3/4/2022           3/4/2022           3/4/2022           3/4/2022           3/4/2022        Long Term Goals (STG) # weeks Goal Review Date Reviewed Date Met   The patient will improve the Upper Extremity Functional Scale to less than 35% Disability 8 Initial 3/4/2022     The patient will demonstrate increased right shoulder flexion active range of motion to greater than 160 degrees in order to improve the patient's ability to reach into overhead cabinets at home 8 Initial 3/4/2022     The patient will increase right upper extremity strength to greater than or equal to 1 manual muscle grade for all deficient areas in order to transfer food trays from counter to refrigerator. 8 Initial 3/4/2022     Pt will be able to sleep on left side 8 Initial 3/4/2022           3/4/2022           3/4/2022                 PLAN     Per POC.    Ronn Batista, PT

## 2022-04-14 ENCOUNTER — CLINICAL SUPPORT (OUTPATIENT)
Dept: REHABILITATION | Facility: HOSPITAL | Age: 62
End: 2022-04-14
Attending: ORTHOPAEDIC SURGERY
Payer: COMMERCIAL

## 2022-04-14 DIAGNOSIS — R29.898 SHOULDER WEAKNESS: Primary | ICD-10-CM

## 2022-04-14 PROCEDURE — 97110 THERAPEUTIC EXERCISES: CPT | Mod: PN

## 2022-04-14 NOTE — PROGRESS NOTES
[facility]   Physical Therapy Treatment Note     Name: Lexi Chaney  Clinic Number: [id number]    Therapy Diagnosis:   No diagnosis found.  Physician: Danny Harmon MD    Visit Date: 4/14/2022       Physician Orders: PT Eval and Treat   Medical Diagnosis from Referral: Left shoulder pain  Evaluation Date: 3/4/2022  Authorization Period Expiration: 4/1/22  Plan of Care Expiration: 5/13/22  Progress Note Due: 4/3/22  Visit # / Visits authorized: 5/20  FOTO: 56/100   FOTO VISIT 5; 67/100  Precautions: Standard      PTA Visit #: 0/5     Time In: 1507  Time Out: 1550  Total Billable Time: 43 minutes    SUBJECTIVE     Pt reports:no pain  She was compliant with home exercise program.  Response to previous treatment: feeling good  Functional change: improving    Pain: 0/10  Location: left shoulder      OBJECTIVE     Objective Measures updated at progress report unless specified.     Treatment     Lexi received the treatments listed below:      therapeutic exercises to develop strength, endurance, ROM, flexibility and posture for 40 minutes including:  UBE 5/5', OH pulley FLEX, ABD 3/3'  Shoulder shrugs/rolls/retr 30  YTB B ER 30.   CC;3#  Shoulder EXT/ROWING 30/30  WAND 1#; FL/EXT/ABD 30 each  PROM EX all planes 5'  manual therapy techniques: dry needling were applied to the: 0 for 0 minutes, including:    supervised modalities after being cleared for contradictions: TENS:  Lexi received TENS electrical stimulation for pain to the . Pt received continuous mode at a rate of 2 pps for 0 minutes. Lexi tolerated treatment well without any adverse effects.     Patient Education and Home Exercises     Home Exercises Provided and Patient Education Provided     Education provided:   - Cont ROM    ASSESSMENT     Performed ex well    Lexi Is progressing well towards her goals.   Pt prognosis is Good.     Pt will continue to benefit from skilled outpatient physical therapy to address the deficits  listed in the problem list box on initial evaluation, provide pt/family education and to maximize pt's level of independence in the home and community environment.     Pt's spiritual, cultural and educational needs considered and pt agreeable to plan of care and goals.     Anticipated barriers to physical therapy: no    Goals:   Short Term Goals (STG) # weeks Goal Review Date Reviewed Date Met   Pt will demonstrate independence with initial HEP to facilitate therex progression and improvements in functional mobility 4 Initial 3/4/2022     The patient will increase AROM FL and ABD in order for pt to don and doff belt/bra with more ease 4 Initial 3/4/2022           3/4/2022     The patient will increase right upper extremity strength to greater than or equal to 1/3 manual muscle grade for all deficient areas in order to facilitate placing object on overhead shelf 4 Initial 3/4/2022           3/4/2022           3/4/2022           3/4/2022           3/4/2022           3/4/2022           3/4/2022        Long Term Goals (STG) # weeks Goal Review Date Reviewed Date Met   The patient will improve the Upper Extremity Functional Scale to less than 35% Disability 8 Initial 3/4/2022     The patient will demonstrate increased right shoulder flexion active range of motion to greater than 160 degrees in order to improve the patient's ability to reach into overhead cabinets at home 8 Initial 3/4/2022     The patient will increase right upper extremity strength to greater than or equal to 1 manual muscle grade for all deficient areas in order to transfer food trays from counter to refrigerator. 8 Initial 3/4/2022     Pt will be able to sleep on left side 8 Initial 3/4/2022           3/4/2022           3/4/2022                 PLAN     Per POC.    Ronn Batista, PT

## 2022-04-21 ENCOUNTER — PATIENT MESSAGE (OUTPATIENT)
Dept: FAMILY MEDICINE | Facility: CLINIC | Age: 62
End: 2022-04-21
Payer: COMMERCIAL

## 2022-04-21 DIAGNOSIS — F51.01 PRIMARY INSOMNIA: ICD-10-CM

## 2022-04-21 DIAGNOSIS — F41.9 ANXIETY: ICD-10-CM

## 2022-04-21 RX ORDER — ALPRAZOLAM 0.5 MG/1
0.5 TABLET ORAL NIGHTLY PRN
Qty: 30 TABLET | Refills: 0 | OUTPATIENT
Start: 2022-04-21

## 2022-04-21 NOTE — TELEPHONE ENCOUNTER
No new care gaps identified.  Powered by [employer]. Reference number: [id number].   4/21/2022 12:36:53 PM CDT

## 2022-04-28 ENCOUNTER — CLINICAL SUPPORT (OUTPATIENT)
Dept: REHABILITATION | Facility: HOSPITAL | Age: 62
End: 2022-04-28
Attending: ORTHOPAEDIC SURGERY
Payer: COMMERCIAL

## 2022-04-28 DIAGNOSIS — R29.898 SHOULDER WEAKNESS: Primary | ICD-10-CM

## 2022-04-28 PROCEDURE — 97110 THERAPEUTIC EXERCISES: CPT | Mod: PN

## 2022-04-28 NOTE — PROGRESS NOTES
[facility]   Physical Therapy Treatment Note     Name: Lexi Chaney  Clinic Number: [id number]    Therapy Diagnosis:   Encounter Diagnosis   Name Primary?    Shoulder weakness Yes     Physician: Danny Harmon MD    Visit Date: 4/28/2022       Physician Orders: PT Eval and Treat   Medical Diagnosis from Referral: Left shoulder pain  Evaluation Date: 3/4/2022  Authorization Period Expiration: 4/1/22  Plan of Care Expiration: 5/13/22  Progress Note Due: 4/3/22  Visit # / Visits authorized: 6/20  FOTO: 56/100   FOTO VISIT 5; 67/100  Precautions: Standard      PTA Visit #: 0/5     Time In: 1501  Time Out: 1541  Total Billable Time: 40 minutes    SUBJECTIVE     Pt reports:no pain  She was compliant with home exercise program.  Response to previous treatment: feeling good  Functional change: improving    Pain: 0/10  Location: left shoulder      OBJECTIVE     Objective Measures updated at progress report unless specified.     Treatment     Lexi received the treatments listed below:      therapeutic exercises to develop strength, endurance, ROM, flexibility and posture for 40 minutes including:  UBE 5/5', OH pulley FLEX, ABD 3/3'  Shoulder shrugs/rolls/retr 30  YTB B ER 30.   CC;3#  Shoulder EXT/ROWING 30/30  WAND 1#; FL/EXT/ABD 30 each  PROM EX all planes 5'  manual therapy techniques: dry needling were applied to the: 0 for 0 minutes, including:    supervised modalities after being cleared for contradictions: TENS:  Lexi received TENS electrical stimulation for pain to the . Pt received continuous mode at a rate of 2 pps for 0 minutes. Lexi tolerated treatment well without any adverse effects.     Patient Education and Home Exercises     Home Exercises Provided and Patient Education Provided     Education provided:   - Cont ROM    ASSESSMENT     Performed ex well    Lexi Is progressing well towards her goals.   Pt prognosis is Good.     Pt will continue to benefit from skilled  outpatient physical therapy to address the deficits listed in the problem list box on initial evaluation, provide pt/family education and to maximize pt's level of independence in the home and community environment.     Pt's spiritual, cultural and educational needs considered and pt agreeable to plan of care and goals.     Anticipated barriers to physical therapy: no    Goals:   Short Term Goals (STG) # weeks Goal Review Date Reviewed Date Met   Pt will demonstrate independence with initial HEP to facilitate therex progression and improvements in functional mobility 4 Initial 3/4/2022     The patient will increase AROM FL and ABD in order for pt to don and doff belt/bra with more ease 4 Initial 3/4/2022           3/4/2022     The patient will increase right upper extremity strength to greater than or equal to 1/3 manual muscle grade for all deficient areas in order to facilitate placing object on overhead shelf 4 Initial 3/4/2022           3/4/2022           3/4/2022           3/4/2022           3/4/2022           3/4/2022           3/4/2022        Long Term Goals (STG) # weeks Goal Review Date Reviewed Date Met   The patient will improve the Upper Extremity Functional Scale to less than 35% Disability 8 Initial 3/4/2022     The patient will demonstrate increased right shoulder flexion active range of motion to greater than 160 degrees in order to improve the patient's ability to reach into overhead cabinets at home 8 Initial 3/4/2022     The patient will increase right upper extremity strength to greater than or equal to 1 manual muscle grade for all deficient areas in order to transfer food trays from counter to refrigerator. 8 Initial 3/4/2022     Pt will be able to sleep on left side 8 Initial 3/4/2022           3/4/2022           3/4/2022                 PLAN     Per POC.    Ronn Batista, PT

## 2022-05-01 ENCOUNTER — PATIENT MESSAGE (OUTPATIENT)
Dept: FAMILY MEDICINE | Facility: CLINIC | Age: 62
End: 2022-05-01
Payer: COMMERCIAL

## 2022-05-01 DIAGNOSIS — F51.01 PRIMARY INSOMNIA: ICD-10-CM

## 2022-05-01 DIAGNOSIS — F41.9 ANXIETY: ICD-10-CM

## 2022-05-02 ENCOUNTER — PATIENT OUTREACH (OUTPATIENT)
Dept: ADMINISTRATIVE | Facility: OTHER | Age: 62
End: 2022-05-02
Payer: COMMERCIAL

## 2022-05-02 ENCOUNTER — OFFICE VISIT (OUTPATIENT)
Dept: PAIN MEDICINE | Facility: CLINIC | Age: 62
End: 2022-05-02
Payer: COMMERCIAL

## 2022-05-02 VITALS
BODY MASS INDEX: 28.68 KG/M2 | HEIGHT: 64 IN | DIASTOLIC BLOOD PRESSURE: 70 MMHG | HEART RATE: 68 BPM | WEIGHT: 168 LBS | SYSTOLIC BLOOD PRESSURE: 114 MMHG

## 2022-05-02 DIAGNOSIS — M46.1 SACROILIITIS: Primary | ICD-10-CM

## 2022-05-02 DIAGNOSIS — M53.3 COCCYDYNIA: ICD-10-CM

## 2022-05-02 DIAGNOSIS — M70.60 GREATER TROCHANTERIC BURSITIS, UNSPECIFIED LATERALITY: ICD-10-CM

## 2022-05-02 DIAGNOSIS — M47.896 OTHER SPONDYLOSIS, LUMBAR REGION: ICD-10-CM

## 2022-05-02 DIAGNOSIS — M51.36 DDD (DEGENERATIVE DISC DISEASE), LUMBAR: ICD-10-CM

## 2022-05-02 PROCEDURE — 3008F BODY MASS INDEX DOCD: CPT | Mod: CPTII,S$GLB,, | Performed by: PHYSICIAN ASSISTANT

## 2022-05-02 PROCEDURE — 3008F PR BODY MASS INDEX (BMI) DOCUMENTED: ICD-10-PCS | Mod: CPTII,S$GLB,, | Performed by: PHYSICIAN ASSISTANT

## 2022-05-02 PROCEDURE — 99999 PR PBB SHADOW E&M-EST. PATIENT-LVL II: ICD-10-PCS | Mod: PBBFAC,,, | Performed by: PHYSICIAN ASSISTANT

## 2022-05-02 PROCEDURE — 3078F PR MOST RECENT DIASTOLIC BLOOD PRESSURE < 80 MM HG: ICD-10-PCS | Mod: CPTII,S$GLB,, | Performed by: PHYSICIAN ASSISTANT

## 2022-05-02 PROCEDURE — 3074F PR MOST RECENT SYSTOLIC BLOOD PRESSURE < 130 MM HG: ICD-10-PCS | Mod: CPTII,S$GLB,, | Performed by: PHYSICIAN ASSISTANT

## 2022-05-02 PROCEDURE — 3074F SYST BP LT 130 MM HG: CPT | Mod: CPTII,S$GLB,, | Performed by: PHYSICIAN ASSISTANT

## 2022-05-02 PROCEDURE — 99999 PR PBB SHADOW E&M-EST. PATIENT-LVL II: CPT | Mod: PBBFAC,,, | Performed by: PHYSICIAN ASSISTANT

## 2022-05-02 PROCEDURE — 99213 PR OFFICE/OUTPT VISIT, EST, LEVL III, 20-29 MIN: ICD-10-PCS | Mod: S$GLB,,, | Performed by: PHYSICIAN ASSISTANT

## 2022-05-02 PROCEDURE — 99213 OFFICE O/P EST LOW 20 MIN: CPT | Mod: S$GLB,,, | Performed by: PHYSICIAN ASSISTANT

## 2022-05-02 PROCEDURE — 3078F DIAST BP <80 MM HG: CPT | Mod: CPTII,S$GLB,, | Performed by: PHYSICIAN ASSISTANT

## 2022-05-02 RX ORDER — ALPRAZOLAM 0.5 MG/1
0.5 TABLET ORAL NIGHTLY PRN
Qty: 30 TABLET | Refills: 0 | Status: SHIPPED | OUTPATIENT
Start: 2022-05-02 | End: 2022-11-10 | Stop reason: SDUPTHER

## 2022-05-02 NOTE — TELEPHONE ENCOUNTER
No new care gaps identified.  Powered by [employer]. Reference number: [id number].   5/02/2022 9:35:01 AM CDT

## 2022-05-02 NOTE — PROGRESS NOTES
Referring Physician: No ref. provider found    PCP: Danny Szymanski MD      CC:  Low back and left leg pain    Interval History:  Lexi Chaney is a 61 y.o. female with low back pain who presents today for fevaluation of low back pain. Reports pain is located bilaterally. Denies any radiation after previous IESI at L5-S1. Continues to c/o tailbone pain with sitting. 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 She does utilize coccygeal pillows when sitting. She has benefited from ganglion impar block in the past with Dr. Macdonald.  She tried 1 visit of PT in the past that increased pain.  Denies b/b changes. Pain today is rated 5/10.       HPI:   Lexi Chaney is a 61 y.o. female who presents with low back and left leg pain.  Pain has worsened over past 3 months.  No recent traumatic incident.  She has constant aching, sharp type pain in her low back and left buttock.  Pain radiates down her left posterior thigh.  Pain worsens sitting, lying, lifting.  Pain improves with standing, walking activity.  She recently had an updated lumbar MRI.  She has been seen by Physical Medicine rehab.  She has tried physical therapy with minimal benefit.  She was referred to us for direct procedure wishes to talk to us specifically about upcoming procedure.  She denies any worsening weakness.  No bowel bladder changes.    ROS:  CONSTITUTIONAL: No fevers, chills, night sweats, wt. loss, appetite changes  SKIN: no rashes or itching  ENT: No headaches, head trauma, vision changes, or eye pain  LYMPH NODES: None noticed   CV: No chest pain, palpitations.   RESP: No shortness of breath, dyspnea on exertion, cough, wheezing, or hemoptysis  GI: No nausea, emesis, diarrhea, constipation, melena, hematochezia, pain.    : No dysuria, hematuria, urgency, or frequency   HEME: No easy bruising, bleeding problems  PSYCHIATRIC: No depression, anxiety, psychosis, hallucinations.  NEURO: No seizures, memory loss, dizziness or difficulty  sleeping  MSK:  Positive HPI      Past Medical History:   Diagnosis Date    Abnormal Pap smear     Abnormal Pap smear of cervix     Anxiety     Arthritis     Cataract     Grave's disease     Grave's disease     IBS (irritable bowel syndrome)     rare    Internal hemorrhoids     followed by Dr. Schilling    Nephrolithiasis     followed by Dr. Pope    Thyroid disease     Vitamin D deficiency      Past Surgical History:   Procedure Laterality Date    CATARACT EXTRACTION  11/19/12    right eye (toric)    CATARACT EXTRACTION W/  INTRAOCULAR LENS IMPLANT  11/26/12    left eye (toric)    EPIDURAL STEROID INJECTION INTO LUMBAR SPINE N/A 10/21/2021    Procedure: INJECTION, STEROID, SPINE, LUMBAR, EPIDURAL;  Surgeon: Marshall Lane MD;  Location: [facility];  Service: Pain Management;  Laterality: N/A;  L5-S1    EXTRACORPOREAL SHOCK WAVE LITHOTRIPSY      EYE SURGERY      eyelid surgery    LITHOTRIPSY      REATTACHMENT OF MUSCLE Right 12/27/2019    Procedure: REATTACHMENT, MUSCLE;  Surgeon: Karoline Jaimes MD;  Location: [address];  Service: Ophthalmology;  Laterality: Right;    tonsillectomy      TRANSFORAMINAL EPIDURAL INJECTION OF STEROID Left 3/24/2020    Procedure: Injection,steroid,epidural,transforaminal approach;  Surgeon: Marshall Lane MD;  Location: [facility];  Service: Pain Management;  Laterality: Left;  L4-5, L5-S1    TRANSFORAMINAL EPIDURAL INJECTION OF STEROID Left 7/28/2020    Procedure: INJECTION, STEROID, EPIDURAL, TRANSFORAMINAL APPROACH;  Surgeon: Marshall Lane MD;  Location: [facility];  Service: Pain Management;  Laterality: Left;  L4-5, L5-S1  leave last on the schedule.  will not need to retest for covid.  Verify no fever/off antiinflammatories and no covid symptoms.     Family History   Problem Relation Age of Onset    Hypertension Father     Stroke Father     Heart disease Mother         valve    Hypertension Mother     Heart disease Brother     Hypertension Brother     Diabetes  "Brother     Amblyopia Neg Hx     Blindness Neg Hx     Cancer Neg Hx     Cataracts Neg Hx     Glaucoma Neg Hx     Macular degeneration Neg Hx     Retinal detachment Neg Hx     Strabismus Neg Hx     Thyroid disease Neg Hx     Anesthesia problems Neg Hx     Clotting disorder Neg Hx     Breast cancer Neg Hx     Ovarian cancer Neg Hx      Social History     Socioeconomic History    Marital status:     Number of children: 2   Occupational History    Occupation: "[employer]     Employer: [employer]   Tobacco Use    Smoking status: Never Smoker    Smokeless tobacco: Never Used   Substance and Sexual Activity    Alcohol use: Yes     Comment: q month    Drug use: No    Sexual activity: Yes     Partners: Male     Birth control/protection: Post-menopausal     Social Determinants of Health     Financial Resource Strain: Low Risk     Difficulty of Paying Living Expenses: Not hard at all   Food Insecurity: No Food Insecurity    Worried About Running Out of Food in the Last Year: Never true    Ran Out of Food in the Last Year: Never true   Transportation Needs: No Transportation Needs    Lack of Transportation (Medical): No    Lack of Transportation (Non-Medical): No   Physical Activity: Insufficiently Active    Days of Exercise per Week: 3 days    Minutes of Exercise per Session: 30 min   Stress: No Stress Concern Present    Feeling of Stress : Not at all   Social Connections: Unknown    Frequency of Communication with Friends and Family: Twice a week    Frequency of Social Gatherings with Friends and Family: Once a week    Active Member of Clubs or Organizations: Yes    Attends Club or Organization Meetings: More than 4 times per year    Marital Status:          Medications/Allergies: See med card    Vitals:    05/02/22 1352   BP: 114/70   Pulse: 68   Weight: 76.2 kg (168 lb)   Height: 5' 4" (1.626 m)   PainSc:   5   PainLoc: Back         Physical exam:    GENERAL: A " and O x3, the patient appears well groomed and is in no acute distress.  Skin: No rashes or obvious lesions  HEENT: normocephalic, atraumatic  CARDIOVASCULAR:  RRR  LUNGS: non labored breathing  ABDOMEN: soft, nontender   UPPER EXTREMITIES: Normal alignment, normal range of motion, no atrophy, no skin changes,  hair growth and nail growth normal and equal bilaterally. No swelling, no tenderness.    LOWER EXTREMITIES:  Normal alignment, normal range of motion, no atrophy, no skin changes,  hair growth and nail growth normal and equal bilaterally. No swelling, no tenderness.    LUMBAR SPINE  Lumbar spine: ROM is full with flexion extension and oblique extension with no increased pain.    Kash's test causes no increased pain on either side.    Supine straight leg raise is negative bilaterally  Internal and external rotation of the hip causes no increased pain on either side.  Myofascial exam: No tenderness to palpation across lumbar paraspinous muscles.  Coccygeal tenderness to palpation.   Severe TTP bilateral SIJ and moderate TTP b/l GTB  + Thigh thrust  +distraction test    MENTAL STATUS: normal orientation, speech, language, and fund of knowledge for social situation.  Emotional state appropriate.    CRANIAL NERVES:  II:  PERRL bilaterally,   III,IV,VI: EOMI.    V:  Facial sensation equal bilaterally  VII:  Facial motor function normal.  VIII:  Hearing equal to finger rub bilaterally  IX/X: Gag normal, palate symmetric  XI:  Shoulder shrug equal, head turn equal  XII:  Tongue midline without fasciculations      MOTOR: Tone and bulk: normal bilateral upper and lower Strength: normal   Delt Bi Tri WE WF     R 5 5 5 5 5 5   L 5 5 5 5 5 5     IP ADD ABD Quad TA Gas HAM  R 5 5 5 5 5 5 5  L 5 5 5 5 5 5 5    SENSATION: Light touch and pinprick intact bilaterally  REFLEXES: normal, symmetric, nonbrisk.  Toes down, no clonus. No hoffmans.  GAIT: normal rise, base, steps, and arm swing.      Imaging:  MRI L-spine  3/2020  L3-4: There is a minimal disc bulge and mild facet joint arthropathy.  There is no spinal canal or significant foraminal stenosis.    L4-5: There is trace anterolisthesis of L4 on L5.  There is a diffuse disc bulge with osteophytic ridging eccentric to the left with very subtle left posterolateral annular fissure.  There is moderate-to-marked facet joint arthropathy.  There is left periarticular edema.  There is a 5 x 6 mm left-sided synovial cysts which contacts the left L5 root and results in crowding of the left lateral recess.  There is a trace left facet joint effusion.  There is no spinal stenosis.  There is no significant foraminal stenosis.    L5-S1: There is facet joint arthropathy.  There is a broad disc bulge with central left paracentral dorsal annular fissure.  There is no spinal canal or foraminal stenosis.  The diffuse disc bulge however does contact the left S1 root.    Assessment:  Lexi Chaney is a 61 y.o. female with   1. Sacroiliitis    2. DDD (degenerative disc disease), lumbar    3. Other spondylosis, lumbar region    4. Coccydynia    5. Greater trochanteric bursitis, unspecified laterality        Plan:  1. I have stressed the importance of physical activity and exercise to improve overall health  2. Monitor progress and consider repeat lumbar epidural steroid injection at L5-S1 if radicular pain returns  3. Consider lumbar MBB workup in the future if low back pain worsens  4. May benefit from ganglion impar block for tailbone. Will consider this in the future.  5. Home exercises provided for both SIJ and GTB. If no improvement, recommend procedure   6. F/u prn

## 2022-05-09 ENCOUNTER — PATIENT MESSAGE (OUTPATIENT)
Dept: SMOKING CESSATION | Facility: CLINIC | Age: 62
End: 2022-05-09
Payer: COMMERCIAL

## 2022-05-11 ENCOUNTER — OFFICE VISIT (OUTPATIENT)
Dept: FAMILY MEDICINE | Facility: CLINIC | Age: 62
End: 2022-05-11
Payer: COMMERCIAL

## 2022-05-11 ENCOUNTER — TELEPHONE (OUTPATIENT)
Dept: ENDOCRINOLOGY | Facility: CLINIC | Age: 62
End: 2022-05-11
Payer: COMMERCIAL

## 2022-05-11 VITALS
RESPIRATION RATE: 18 BRPM | TEMPERATURE: 98 F | DIASTOLIC BLOOD PRESSURE: 60 MMHG | SYSTOLIC BLOOD PRESSURE: 122 MMHG | HEIGHT: 64 IN | WEIGHT: 168.19 LBS | BODY MASS INDEX: 28.71 KG/M2 | HEART RATE: 70 BPM | OXYGEN SATURATION: 97 %

## 2022-05-11 DIAGNOSIS — F41.9 ANXIETY: ICD-10-CM

## 2022-05-11 DIAGNOSIS — M54.50 CHRONIC LOW BACK PAIN WITHOUT SCIATICA, UNSPECIFIED BACK PAIN LATERALITY: ICD-10-CM

## 2022-05-11 DIAGNOSIS — G89.29 CHRONIC LOW BACK PAIN WITHOUT SCIATICA, UNSPECIFIED BACK PAIN LATERALITY: ICD-10-CM

## 2022-05-11 DIAGNOSIS — Z00.00 PREVENTATIVE HEALTH CARE: Primary | ICD-10-CM

## 2022-05-11 PROCEDURE — 1159F MED LIST DOCD IN RCRD: CPT | Mod: CPTII,S$GLB,, | Performed by: FAMILY MEDICINE

## 2022-05-11 PROCEDURE — 99999 PR PBB SHADOW E&M-EST. PATIENT-LVL III: CPT | Mod: PBBFAC,,, | Performed by: FAMILY MEDICINE

## 2022-05-11 PROCEDURE — 99396 PREV VISIT EST AGE 40-64: CPT | Mod: S$GLB,,, | Performed by: FAMILY MEDICINE

## 2022-05-11 PROCEDURE — 99396 PR PREVENTIVE VISIT,EST,40-64: ICD-10-PCS | Mod: S$GLB,,, | Performed by: FAMILY MEDICINE

## 2022-05-11 PROCEDURE — 3078F DIAST BP <80 MM HG: CPT | Mod: CPTII,S$GLB,, | Performed by: FAMILY MEDICINE

## 2022-05-11 PROCEDURE — 3074F SYST BP LT 130 MM HG: CPT | Mod: CPTII,S$GLB,, | Performed by: FAMILY MEDICINE

## 2022-05-11 PROCEDURE — 3074F PR MOST RECENT SYSTOLIC BLOOD PRESSURE < 130 MM HG: ICD-10-PCS | Mod: CPTII,S$GLB,, | Performed by: FAMILY MEDICINE

## 2022-05-11 PROCEDURE — 1159F PR MEDICATION LIST DOCUMENTED IN MEDICAL RECORD: ICD-10-PCS | Mod: CPTII,S$GLB,, | Performed by: FAMILY MEDICINE

## 2022-05-11 PROCEDURE — 99999 PR PBB SHADOW E&M-EST. PATIENT-LVL III: ICD-10-PCS | Mod: PBBFAC,,, | Performed by: FAMILY MEDICINE

## 2022-05-11 PROCEDURE — 3008F BODY MASS INDEX DOCD: CPT | Mod: CPTII,S$GLB,, | Performed by: FAMILY MEDICINE

## 2022-05-11 PROCEDURE — 3078F PR MOST RECENT DIASTOLIC BLOOD PRESSURE < 80 MM HG: ICD-10-PCS | Mod: CPTII,S$GLB,, | Performed by: FAMILY MEDICINE

## 2022-05-11 PROCEDURE — 3008F PR BODY MASS INDEX (BMI) DOCUMENTED: ICD-10-PCS | Mod: CPTII,S$GLB,, | Performed by: FAMILY MEDICINE

## 2022-05-11 RX ORDER — CLINDAMYCIN HYDROCHLORIDE 150 MG/1
CAPSULE ORAL
COMMUNITY
Start: 2022-05-04 | End: 2022-11-10

## 2022-05-11 RX ORDER — CYCLOBENZAPRINE HCL 10 MG
10 TABLET ORAL 3 TIMES DAILY PRN
Qty: 30 TABLET | Refills: 11 | Status: SHIPPED | OUTPATIENT
Start: 2022-05-11 | End: 2022-05-21

## 2022-05-11 NOTE — TELEPHONE ENCOUNTER
----- Message from Danny Szymanski MD sent at 5/11/2022  8:35 AM CDT -----  Please contact patient to schedule her annual appointment. She will do your ordered labs today.

## 2022-05-11 NOTE — TELEPHONE ENCOUNTER
Dr. Coker has nothing available.He's completely booked until November. His December schedule opens June 1st. Pt can call that day to schedule, we can also put her in the wait list    Thanks

## 2022-05-11 NOTE — PROGRESS NOTES
Subjective:       Patient ID: Lexi Chaney is a 61 y.o. female.    Chief Complaint: Medication Refill    Here for a general exam and f/u on chronic health issues    She is overall feeling well.    Graves disease, hypothyroidsm - s/o JOHNS; taking Cytomel and synthroid; following with endocrinology  Palpitations - taking Toprol XL 25mg daily  Anxiety - some goes day and bad days; some anxiety at bedtime; using benadryl at night; using Xanax 0.5mg 1/2 tab PRN insomnia or panic  Uses xana primarily after getting steroid injections for control of her shoulder and low back pains.  Also needs refill of flexeril          Medication Refill  Pertinent negatives include no abdominal pain, arthralgias, chest pain, chills, coughing, fever, headaches, nausea, neck pain, numbness, rash, sore throat, vomiting or weakness.       Past Medical History:   Diagnosis Date    Abnormal Pap smear     Abnormal Pap smear of cervix     Anxiety     Arthritis     Cataract     Grave's disease     Grave's disease     IBS (irritable bowel syndrome)     rare    Internal hemorrhoids     followed by Dr. Schilling    Nephrolithiasis     followed by Dr. Pope    Thyroid disease     Vitamin D deficiency        Past Surgical History:   Procedure Laterality Date    CATARACT EXTRACTION  11/19/12    right eye (toric)    CATARACT EXTRACTION W/  INTRAOCULAR LENS IMPLANT  11/26/12    left eye (toric)    EPIDURAL STEROID INJECTION INTO LUMBAR SPINE N/A 10/21/2021    Procedure: INJECTION, STEROID, SPINE, LUMBAR, EPIDURAL;  Surgeon: Marshall Lane MD;  Location: [facility] OR;  Service: Pain Management;  Laterality: N/A;  L5-S1    EXTRACORPOREAL SHOCK WAVE LITHOTRIPSY      EYE SURGERY      eyelid surgery    LITHOTRIPSY      REATTACHMENT OF MUSCLE Right 12/27/2019    Procedure: REATTACHMENT, MUSCLE;  Surgeon: Karoline Jaimes MD;  Location: [facility] OR [address] [place] [address];  Service: Ophthalmology;  Laterality: Right;    tonsillectomy      TRANSFORAMINAL EPIDURAL  INJECTION OF STEROID Left 3/24/2020    Procedure: Injection,steroid,epidural,transforaminal approach;  Surgeon: Marshall Lane MD;  Location: [facility] OR;  Service: Pain Management;  Laterality: Left;  L4-5, L5-S1    TRANSFORAMINAL EPIDURAL INJECTION OF STEROID Left 7/28/2020    Procedure: INJECTION, STEROID, EPIDURAL, TRANSFORAMINAL APPROACH;  Surgeon: Marshall Lane MD;  Location: [facility] OR;  Service: Pain Management;  Laterality: Left;  L4-5, L5-S1  leave last on the schedule.  will not need to retest for covid.  Verify no fever/off antiinflammatories and no covid symptoms.       Review of patient's allergies indicates:   Allergen Reactions    Sulfa (sulfonamide antibiotics) Itching and Other (See Comments)     Hyperventilation    Keflex [cephalexin] Other (See Comments)     Abd pain       Social History     Socioeconomic History    Marital status:     Number of children: 2   Occupational History    Occupation: [employer]     Employer: [employer]   Tobacco Use    Smoking status: Never Smoker    Smokeless tobacco: Never Used   Substance and Sexual Activity    Alcohol use: Yes     Comment: q month    Drug use: No    Sexual activity: Yes     Partners: Male     Birth control/protection: Post-menopausal     Social Determinants of Health     Financial Resource Strain: Low Risk     Difficulty of Paying Living Expenses: Not hard at all   Food Insecurity: No Food Insecurity    Worried About Running Out of Food in the Last Year: Never true    Ran Out of Food in the Last Year: Never true   Transportation Needs: No Transportation Needs    Lack of Transportation (Medical): No    Lack of Transportation (Non-Medical): No   Physical Activity: Insufficiently Active    Days of Exercise per Week: 3 days    Minutes of Exercise per Session: 30 min   Stress: No Stress Concern Present    Feeling of Stress : Not at all   Social Connections: Unknown    Frequency of Communication with Friends and Family: Twice  a week    Frequency of Social Gatherings with Friends and Family: Once a week    Active Member of Clubs or Organizations: Yes    Attends Club or Organization Meetings: More than 4 times per year    Marital Status:        Current Outpatient Medications on File Prior to Visit   Medication Sig Dispense Refill    ALPRAZolam (XANAX) 0.5 MG tablet Take 1 tablet (0.5 mg total) by mouth nightly as needed. 30 tablet 0    cholecalciferol, vitamin D3, 5,000 unit Tab Take 5,000 Units by mouth every other day.      clindamycin (CLEOCIN) 150 MG capsule Take by mouth.      dicyclomine (BENTYL) 20 mg tablet Take 1 tablet (20 mg total) by mouth every 8 (eight) hours as needed. 30 tablet 3    diphenhydrAMINE (BENADRYL) 25 mg capsule Take 25 mg by mouth nightly as needed for Allergies or Insomnia.      liothyronine (CYTOMEL) 5 MCG Tab TAKE 1 TABLET BY MOUTH EVERY DAY 90 tablet 1    metoprolol succinate (TOPROL-XL) 25 MG 24 hr tablet TAKE 1 TABLET BY MOUTH EVERY DAY 90 tablet 3    promethazine (PHENERGAN) 25 MG tablet Take 1 tablet (25 mg total) by mouth every 4 (four) hours. 30 tablet 0    SYNTHROID 125 mcg tablet TAKE 1 TABLET BY MOUTH EVERY DAY FOR 6 DAYS A WEEK AND 1/2 TABLET ON DAY 7 84 tablet 2    clobetasol 0.05% (TEMOVATE) 0.05 % Oint Clobetasol propionate 0.05% once daily at night for 4 weeks, then alternate nights for 4 weeks, and then twice weekly for 4 weeks (Patient not taking: Reported on 5/11/2022) 45 g 1    [DISCONTINUED] albuterol (VENTOLIN HFA) 90 mcg/actuation inhaler Inhale 2 puffs into the lungs every 6 (six) hours as needed for Wheezing. Rescue (Patient not taking: Reported on 5/11/2022) 18 g 0    [DISCONTINUED] fluticasone propionate (FLONASE) 50 mcg/actuation nasal spray 1 spray (50 mcg total) by Each Nostril route once daily. (Patient not taking: Reported on 5/11/2022) 15.8 mL 0     No current facility-administered medications on file prior to visit.       Family History   Problem  "Relation Age of Onset    Hypertension Father     Stroke Father     Heart disease Mother         valve    Hypertension Mother     Heart disease Brother     Hypertension Brother     Diabetes Brother     Amblyopia Neg Hx     Blindness Neg Hx     Cancer Neg Hx     Cataracts Neg Hx     Glaucoma Neg Hx     Macular degeneration Neg Hx     Retinal detachment Neg Hx     Strabismus Neg Hx     Thyroid disease Neg Hx     Anesthesia problems Neg Hx     Clotting disorder Neg Hx     Breast cancer Neg Hx     Ovarian cancer Neg Hx        Review of Systems   Constitutional: Negative for appetite change, chills, fever and unexpected weight change.   HENT: Negative for sore throat and trouble swallowing.    Eyes: Negative for pain and visual disturbance.   Respiratory: Negative for cough, shortness of breath and wheezing.    Cardiovascular: Negative for chest pain and palpitations.   Gastrointestinal: Negative for abdominal pain, blood in stool, diarrhea, nausea and vomiting.   Genitourinary: Negative for difficulty urinating, dysuria and hematuria.   Musculoskeletal: Negative for arthralgias, gait problem and neck pain.   Skin: Negative for rash and wound.   Neurological: Negative for dizziness, weakness, numbness and headaches.   Hematological: Negative for adenopathy.   Psychiatric/Behavioral: Negative for dysphoric mood. The patient is nervous/anxious.        Objective:      /60   Pulse 70   Temp 98.3 °F (36.8 °C) (Oral)   Resp 18   Ht 5' 4" (1.626 m)   Wt 76.3 kg (168 lb 3.4 oz)   SpO2 97%   BMI 28.87 kg/m²   Physical Exam  Constitutional:       Appearance: Normal appearance. She is well-developed.   HENT:      Head: Normocephalic.      Mouth/Throat:      Pharynx: No oropharyngeal exudate or posterior oropharyngeal erythema.   Eyes:      Conjunctiva/sclera: Conjunctivae normal.      Pupils: Pupils are equal, round, and reactive to light.   Neck:      Thyroid: No thyromegaly.   Cardiovascular:      " Rate and Rhythm: Normal rate and regular rhythm.      Heart sounds: Normal heart sounds, S1 normal and S2 normal. No murmur heard.    No friction rub. No gallop.   Pulmonary:      Effort: Pulmonary effort is normal.      Breath sounds: Normal breath sounds. No wheezing or rales.   Abdominal:      General: Bowel sounds are normal. There is no distension.      Palpations: Abdomen is soft.      Tenderness: There is no abdominal tenderness.   Musculoskeletal:      Cervical back: Normal range of motion and neck supple.      Right lower leg: No edema.      Left lower leg: No edema.   Lymphadenopathy:      Cervical: No cervical adenopathy.   Skin:     General: Skin is warm.      Findings: No rash.   Neurological:      Mental Status: She is alert and oriented to person, place, and time.      Cranial Nerves: No cranial nerve deficit.      Gait: Gait normal.         Results for orders placed or performed in visit on 03/07/22   SARS Coronavirus 2 Antigen, POCT Manual Read   Result Value Ref Range    SARS Coronavirus 2 Antigen Positive (A) Negative     Acceptable Yes    POCT Influenza A/B   Result Value Ref Range    Rapid Influenza A Ag Negative Negative    Rapid Influenza B Ag Negative Negative     Acceptable Yes        Assessment:       1. Preventative health care    2. Anxiety    3. Chronic low back pain without sciatica, unspecified back pain laterality        Plan:       Preventative health care  -     Comprehensive Metabolic Panel; Future; Expected date: 05/11/2022  -     Lipid Panel; Future; Expected date: 05/11/2022    Anxiety    Chronic low back pain without sciatica, unspecified back pain laterality  -     cyclobenzaprine (FLEXERIL) 10 MG tablet; Take 1 tablet (10 mg total) by mouth 3 (three) times daily as needed for Muscle spasms.  Dispense: 30 tablet; Refill: 11        Labs soon  Continue current medications and specialty follow-up  Counseled on regular exercise, maintenance of a  healthy weight, balanced diet rich in fruits/vegetables and lean protein, and avoidance of unhealthy habits like smoking and excessive alcohol intake.

## 2022-05-12 ENCOUNTER — LAB VISIT (OUTPATIENT)
Dept: LAB | Facility: HOSPITAL | Age: 62
End: 2022-05-12
Attending: FAMILY MEDICINE
Payer: COMMERCIAL

## 2022-05-12 ENCOUNTER — CLINICAL SUPPORT (OUTPATIENT)
Dept: REHABILITATION | Facility: HOSPITAL | Age: 62
End: 2022-05-12
Attending: ORTHOPAEDIC SURGERY
Payer: COMMERCIAL

## 2022-05-12 DIAGNOSIS — Z00.00 PREVENTATIVE HEALTH CARE: ICD-10-CM

## 2022-05-12 DIAGNOSIS — R29.898 SHOULDER WEAKNESS: Primary | ICD-10-CM

## 2022-05-12 DIAGNOSIS — E03.9 HYPOTHYROIDISM, UNSPECIFIED TYPE: ICD-10-CM

## 2022-05-12 LAB
ALBUMIN SERPL BCP-MCNC: 4.1 G/DL (ref 3.5–5.2)
ALP SERPL-CCNC: 79 U/L (ref 55–135)
ALT SERPL W/O P-5'-P-CCNC: 18 U/L (ref 10–44)
ANION GAP SERPL CALC-SCNC: 10 MMOL/L (ref 8–16)
AST SERPL-CCNC: 18 U/L (ref 10–40)
BILIRUB SERPL-MCNC: 0.5 MG/DL (ref 0.1–1)
BUN SERPL-MCNC: 12 MG/DL (ref 8–23)
CALCIUM SERPL-MCNC: 9.6 MG/DL (ref 8.7–10.5)
CHLORIDE SERPL-SCNC: 102 MMOL/L (ref 95–110)
CHOLEST SERPL-MCNC: 234 MG/DL (ref 120–199)
CHOLEST/HDLC SERPL: 3.2 {RATIO} (ref 2–5)
CO2 SERPL-SCNC: 26 MMOL/L (ref 23–29)
CREAT SERPL-MCNC: 0.7 MG/DL (ref 0.5–1.4)
EST. GFR  (AFRICAN AMERICAN): >60 ML/MIN/1.73 M^2
EST. GFR  (NON AFRICAN AMERICAN): >60 ML/MIN/1.73 M^2
GLUCOSE SERPL-MCNC: 85 MG/DL (ref 70–110)
HDLC SERPL-MCNC: 73 MG/DL (ref 40–75)
HDLC SERPL: 31.2 % (ref 20–50)
LDLC SERPL CALC-MCNC: 140 MG/DL (ref 63–159)
NONHDLC SERPL-MCNC: 161 MG/DL
POTASSIUM SERPL-SCNC: 4.3 MMOL/L (ref 3.5–5.1)
PROT SERPL-MCNC: 7.4 G/DL (ref 6–8.4)
SODIUM SERPL-SCNC: 138 MMOL/L (ref 136–145)
T3 SERPL-MCNC: 82 NG/DL (ref 60–180)
T4 FREE SERPL-MCNC: 0.87 NG/DL (ref 0.71–1.51)
TRIGL SERPL-MCNC: 105 MG/DL (ref 30–150)
TSH SERPL DL<=0.005 MIU/L-ACNC: 1.8 UIU/ML (ref 0.4–4)

## 2022-05-12 PROCEDURE — 84480 ASSAY TRIIODOTHYRONINE (T3): CPT | Performed by: INTERNAL MEDICINE

## 2022-05-12 PROCEDURE — 97110 THERAPEUTIC EXERCISES: CPT | Mod: PN

## 2022-05-12 PROCEDURE — 80061 LIPID PANEL: CPT | Performed by: FAMILY MEDICINE

## 2022-05-12 PROCEDURE — 80053 COMPREHEN METABOLIC PANEL: CPT | Performed by: FAMILY MEDICINE

## 2022-05-12 PROCEDURE — 36415 COLL VENOUS BLD VENIPUNCTURE: CPT | Mod: PO | Performed by: INTERNAL MEDICINE

## 2022-05-12 PROCEDURE — 84439 ASSAY OF FREE THYROXINE: CPT | Performed by: INTERNAL MEDICINE

## 2022-05-12 PROCEDURE — 84443 ASSAY THYROID STIM HORMONE: CPT | Performed by: INTERNAL MEDICINE

## 2022-05-12 NOTE — PROGRESS NOTES
[facility]  PT Discharge Note    Name: Lexi Chaney  Clinic Number: [id number]    Therapy Diagnosis:   Encounter Diagnosis   Name Primary?    Shoulder weakness Yes     Physician: Danny Harmon MD    Physician Orders: Eval and treat  Medical Diagnosis: Left shoulder pain  Evaluation Date: 3/4/22      Date of Last visit: 5/12/22  Total Visits Received: 6    ASSESSMENT      LTG met.    Discharge reason: Patient has met all of his/her goals    Discharge FOTO Score: 67/100    Goals: Met  Pain 0/10.  ROM WNL/WFL in all planes.  Strength 5/5 in all planes.  Pt is independent with HEP.  Previous CHRISTIANO restored.  PLAN   This patient is discharged from Physical Therapy      Ronn Batista, PT

## 2022-05-12 NOTE — PROGRESS NOTES
[facility]   Physical Therapy Treatment Note     Name: Lexi Chaney  Clinic Number: [id number]    Therapy Diagnosis:   Encounter Diagnosis   Name Primary?    Shoulder weakness Yes     Physician: Danny Harmon MD    Visit Date: 5/12/2022       Physician Orders: PT Eval and Treat   Medical Diagnosis from Referral: Left shoulder pain  Evaluation Date: 3/4/2022  Authorization Period Expiration: 4/1/22  Plan of Care Expiration: 5/13/22  Progress Note Due: 4/3/22  Visit # / Visits authorized: 6/20  FOTO: 56/100   FOTO VISIT 5; 67/100  Precautions: Standard      PTA Visit #: 0/5     Time In: 1510  Time Out: 1540  Total Billable Time: 30 minutes    SUBJECTIVE     Pt reports:no pain  She was compliant with home exercise program.  Response to previous treatment: feeling good  Functional change: improving    Pain: 0/10  Location: left shoulder      OBJECTIVE     Objective Measures updated at progress report unless specified.     Treatment     Lexi received the treatments listed below:      therapeutic exercises to develop strength, endurance, ROM, flexibility and posture for 40 minutes including:  UBE 5/5', OH pulley FLEX, ABD 3/3'  Shoulder shrugs/rolls/retr 30  YTB B ER 30.   CC;3#  Shoulder EXT/ROWING 30/30  WAND 1#; FL/EXT/ABD 30 each  manual therapy techniques: dry needling were applied to the: 0 for 0 minutes, including:    supervised modalities after being cleared for contradictions: TENS:  Lexi received TENS electrical stimulation for pain to the . Pt received continuous mode at a rate of 2 pps for 0 minutes. Lexi tolerated treatment well without any adverse effects.     Patient Education and Home Exercises     Home Exercises Provided and Patient Education Provided     Education provided:   - Cont ROM    ASSESSMENT     Goals met.  Performed ex well    Lexi Is progressing well towards her goals.   Pt prognosis is Good.     Pt will continue to benefit from skilled outpatient  physical therapy to address the deficits listed in the problem list box on initial evaluation, provide pt/family education and to maximize pt's level of independence in the home and community environment.     Pt's spiritual, cultural and educational needs considered and pt agreeable to plan of care and goals.     Anticipated barriers to physical therapy: no    Goals:   Short Term Goals (STG) # weeks Goal Review Date Reviewed Date Met   Pt will demonstrate independence with initial HEP to facilitate therex progression and improvements in functional mobility 4 Initial 3/4/2022     The patient will increase AROM FL and ABD in order for pt to don and doff belt/bra with more ease 4 Initial 3/4/2022           3/4/2022     The patient will increase right upper extremity strength to greater than or equal to 1/3 manual muscle grade for all deficient areas in order to facilitate placing object on overhead shelf 4 Initial 3/4/2022           3/4/2022           3/4/2022           3/4/2022           3/4/2022           3/4/2022           3/4/2022        Long Term Goals (STG) # weeks Goal Review Date Reviewed Date Met   The patient will improve the Upper Extremity Functional Scale to less than 35% Disability 8 Initial 3/4/2022     The patient will demonstrate increased right shoulder flexion active range of motion to greater than 160 degrees in order to improve the patient's ability to reach into overhead cabinets at home 8 Initial 3/4/2022     The patient will increase right upper extremity strength to greater than or equal to 1 manual muscle grade for all deficient areas in order to transfer food trays from counter to refrigerator. 8 Initial 3/4/2022     Pt will be able to sleep on left side 8 Initial 3/4/2022           3/4/2022           3/4/2022                 PLAN     DC per POC.    Ronn Batista, PT

## 2022-05-30 ENCOUNTER — OFFICE VISIT (OUTPATIENT)
Dept: OPHTHALMOLOGY | Facility: CLINIC | Age: 62
End: 2022-05-30
Payer: COMMERCIAL

## 2022-05-30 DIAGNOSIS — Z96.1 PSEUDOPHAKIA, BOTH EYES: ICD-10-CM

## 2022-05-30 DIAGNOSIS — H57.89 THYROID EYE DISEASE: ICD-10-CM

## 2022-05-30 DIAGNOSIS — E07.9 THYROID EYE DISEASE: ICD-10-CM

## 2022-05-30 DIAGNOSIS — H43.812 POSTERIOR VITREOUS DETACHMENT OF LEFT EYE: Primary | ICD-10-CM

## 2022-05-30 PROCEDURE — 1160F RVW MEDS BY RX/DR IN RCRD: CPT | Mod: CPTII,S$GLB,, | Performed by: OPHTHALMOLOGY

## 2022-05-30 PROCEDURE — 92014 PR EYE EXAM, EST PATIENT,COMPREHESV: ICD-10-PCS | Mod: S$GLB,,, | Performed by: OPHTHALMOLOGY

## 2022-05-30 PROCEDURE — 99999 PR PBB SHADOW E&M-EST. PATIENT-LVL III: ICD-10-PCS | Mod: PBBFAC,,, | Performed by: OPHTHALMOLOGY

## 2022-05-30 PROCEDURE — 1159F MED LIST DOCD IN RCRD: CPT | Mod: CPTII,S$GLB,, | Performed by: OPHTHALMOLOGY

## 2022-05-30 PROCEDURE — 1160F PR REVIEW ALL MEDS BY PRESCRIBER/CLIN PHARMACIST DOCUMENTED: ICD-10-PCS | Mod: CPTII,S$GLB,, | Performed by: OPHTHALMOLOGY

## 2022-05-30 PROCEDURE — 92014 COMPRE OPH EXAM EST PT 1/>: CPT | Mod: S$GLB,,, | Performed by: OPHTHALMOLOGY

## 2022-05-30 PROCEDURE — 1159F PR MEDICATION LIST DOCUMENTED IN MEDICAL RECORD: ICD-10-PCS | Mod: CPTII,S$GLB,, | Performed by: OPHTHALMOLOGY

## 2022-05-30 PROCEDURE — 99999 PR PBB SHADOW E&M-EST. PATIENT-LVL III: CPT | Mod: PBBFAC,,, | Performed by: OPHTHALMOLOGY

## 2022-05-30 NOTE — PROGRESS NOTES
HPI     New pt here for comprehensive eye exam. States floaters OS. Sees FOL when   blinking in the dark room. States cataract surgery OU 2012. Pain in eyes   sometimes. Graves. Using OTC AT's during the night to help keep eyes   lubricated. States using PF omega 3 gtts does not like feels like sand in   eyes.     Last edited by Haydee Mccrary on 5/30/2022  3:40 PM. (History)            Assessment /Plan     For exam results, see Encounter Report.    Posterior vitreous detachment of left eye    Pseudophakia, both eyes    Thyroid eye disease      1. Posterior vitreous detachment of left eye  Long-standing  RD precautions  Pt is bothered by floaters  Considering retina eval in time, defers for now    2. Pseudophakia, both eyes  Doing well    3. Thyroid eye disease  S/p multiple eyelid surgeries  Doing well   monitor    F/u 1 year, routine exam

## 2022-05-30 NOTE — PATIENT INSTRUCTIONS
What are floaters?     Floaters look like small specks, dots, circles, lines or cobwebs in your field of vision. While they seem to be in front of your eye, they are floating inside. Floaters are tiny clumps of gel or cells inside the vitreous that fills your eye. What you see are the shadows these clumps cast on your retina.    You usually notice floaters when looking  at something plain, like a blank wall or a blue jovanny.    As we age, our vitreous starts to thicken or shrink. Sometimes clumps or strands form in the vitreous. If the vitreous pulls away from the back of the eye, it is called posterior vitreous detachment. Floaters usually happen with posterior vitreous detachment. They are not serious, and they tend to fade or go away over time. Severe floaters can be removed by surgery, but this has risks and is seldom necessary.    You are more likely to get floaters if you:    are nearsighted (you need glasses to see far away)    have had surgery for cataracts    have had inflammation (swelling) inside the eye      What are flashes?     Flashes can look like flashing lights or lightning streaks in your field of vision. Some people compare them to seeing stars after being hit on the head. You might see flashes on and off for weeks, or even months. Flashes happen when the vitreous rubs or pulls on your retina.    As people age, it is common to see flashes occasionally.       When floaters and flashes are serious     Most floaters and flashes are not a problem. However, there are times when they can be signs of a serious condition. Here is when you should call an ophthalmologist right away:    you notice a lot of new floaters    you have a lot of flashes    a shadow appears in your peripheral (side) vision    a gray curtain covers part of  your vision    These floaters and flashes could be symptoms of a torn or detached retina. This is when the retina pulls away from the back of your eye. This is a serious  condition that needs to be treated.    Summary    Floaters are dark specks or dots in your field of vision. They are shadows you see from clumps of vitreous gel in your eye. Flashes are flashes of light that look like lightning streaks in your field of vision. Flashes occur when the vitreous gel rubs or pulls on your retina.    Floaters and flashes are quite common as people age. However, they can be signs of a retinal detachment, which is a serious problem. If you suddenly have a lot of floaters and see flashes, and you notice changes in your vision, call your ophthalmologist right away.       CONST: Well appearing in NAD  MS: Normal ROM in all extremities. No midline spinal tenderness. Right knee with medial redness and swelling. No joint effusion. Pain medially on flexion only. There is overlying cellulitis in that area with small abrasion in mid of cellulitis. No streaking. no septic joint.  SKIN: Warm, dry, no acute rashes. Good turgor  NEURO: A&Ox3, No focal deficits. Strength 5/5 with no sensory deficits. Steady gait

## 2022-05-31 ENCOUNTER — PATIENT MESSAGE (OUTPATIENT)
Dept: ADMINISTRATIVE | Facility: HOSPITAL | Age: 62
End: 2022-05-31
Payer: COMMERCIAL

## 2022-08-24 ENCOUNTER — PATIENT MESSAGE (OUTPATIENT)
Dept: ADMINISTRATIVE | Facility: HOSPITAL | Age: 62
End: 2022-08-24
Payer: COMMERCIAL

## 2022-08-29 ENCOUNTER — PATIENT MESSAGE (OUTPATIENT)
Dept: ENDOCRINOLOGY | Facility: CLINIC | Age: 62
End: 2022-08-29
Payer: COMMERCIAL

## 2022-08-29 ENCOUNTER — LAB VISIT (OUTPATIENT)
Dept: LAB | Facility: HOSPITAL | Age: 62
End: 2022-08-29
Attending: INTERNAL MEDICINE
Payer: COMMERCIAL

## 2022-08-29 DIAGNOSIS — E03.9 HYPOTHYROIDISM, UNSPECIFIED TYPE: ICD-10-CM

## 2022-08-29 DIAGNOSIS — E55.9 VITAMIN D DEFICIENCY: ICD-10-CM

## 2022-08-29 DIAGNOSIS — E03.9 HYPOTHYROIDISM, UNSPECIFIED TYPE: Primary | ICD-10-CM

## 2022-08-29 LAB
25(OH)D3+25(OH)D2 SERPL-MCNC: 31 NG/ML (ref 30–96)
T3 SERPL-MCNC: 102 NG/DL (ref 60–180)
T4 FREE SERPL-MCNC: 1.02 NG/DL (ref 0.71–1.51)
TSH SERPL DL<=0.005 MIU/L-ACNC: 1.89 UIU/ML (ref 0.4–4)

## 2022-08-29 PROCEDURE — 36415 COLL VENOUS BLD VENIPUNCTURE: CPT | Mod: PO | Performed by: INTERNAL MEDICINE

## 2022-08-29 PROCEDURE — 82306 VITAMIN D 25 HYDROXY: CPT | Performed by: INTERNAL MEDICINE

## 2022-08-29 PROCEDURE — 84439 ASSAY OF FREE THYROXINE: CPT | Performed by: INTERNAL MEDICINE

## 2022-08-29 PROCEDURE — 84443 ASSAY THYROID STIM HORMONE: CPT | Performed by: INTERNAL MEDICINE

## 2022-08-29 PROCEDURE — 84480 ASSAY TRIIODOTHYRONINE (T3): CPT | Performed by: INTERNAL MEDICINE

## 2022-08-31 ENCOUNTER — OFFICE VISIT (OUTPATIENT)
Dept: ENDOCRINOLOGY | Facility: CLINIC | Age: 62
End: 2022-08-31
Payer: COMMERCIAL

## 2022-08-31 DIAGNOSIS — E03.9 HYPOTHYROIDISM, UNSPECIFIED TYPE: Primary | ICD-10-CM

## 2022-08-31 PROCEDURE — 1160F PR REVIEW ALL MEDS BY PRESCRIBER/CLIN PHARMACIST DOCUMENTED: ICD-10-PCS | Mod: CPTII,95,, | Performed by: INTERNAL MEDICINE

## 2022-08-31 PROCEDURE — 1159F MED LIST DOCD IN RCRD: CPT | Mod: CPTII,95,, | Performed by: INTERNAL MEDICINE

## 2022-08-31 PROCEDURE — 1160F RVW MEDS BY RX/DR IN RCRD: CPT | Mod: CPTII,95,, | Performed by: INTERNAL MEDICINE

## 2022-08-31 PROCEDURE — 1159F PR MEDICATION LIST DOCUMENTED IN MEDICAL RECORD: ICD-10-PCS | Mod: CPTII,95,, | Performed by: INTERNAL MEDICINE

## 2022-08-31 PROCEDURE — 99213 PR OFFICE/OUTPT VISIT, EST, LEVL III, 20-29 MIN: ICD-10-PCS | Mod: 95,,, | Performed by: INTERNAL MEDICINE

## 2022-08-31 PROCEDURE — 99213 OFFICE O/P EST LOW 20 MIN: CPT | Mod: 95,,, | Performed by: INTERNAL MEDICINE

## 2022-08-31 NOTE — PROGRESS NOTES
The patient location is: [place]    Visit type: audiovisual    Face to Face time with patient: 13  13 minutes of total time spent on the encounter, which includes face to face time and non-face to face time preparing to see the patient (eg, review of tests), Obtaining and/or reviewing separately obtained history, Documenting clinical information in the electronic or other health record, Independently interpreting results (not separately reported) and communicating results to the patient/family/caregiver, or Care coordination (not separately reported).         Each patient to whom he or she provides medical services by telemedicine is:  (1) informed of the relationship between the physician and patient and the respective role of any other health care provider with respect to management of the patient; and (2) notified that he or she may decline to receive medical services by telemedicine and may withdraw from such care at any time.    Notes:   CHIEF COMPLAINT: Hypothyroidism/status post Graves  62 y.o.  female here for followup. Currently on Synthroid 125 mcg 6 days a week and 1/2 tablet on day 7 and Cytomel 5 daily. Has tried generic in the past and had difficulty tolerating. No Palpitations. No tremors. On beta blocker. No Diarrhea or constipation. No change in vision. Still having dry eyes.         PAST MEDICAL HISTORY: Graves treated with radioactive iodine in 2000. Also had Graves eye disease    PAST SURGICAL HISTORY: 3 surgeries for Graves eye disease, tonsillectomy, lithotripsy    SOCIAL HISTORY: Does not smoke or drink    FAMILY HISTORY: Hypothyroidism, heart disease, diabetes. Incidentally  had Graves' disease as well    MEDICATIONS/ALLERGIES: The patient's MedCard has been updated and reviewed.         PE: Virtual Visit     Latest Reference Range & Units 08/29/22 14:12   TSH 0.400 - 4.000 uIU/mL 1.889   T3, Total 60 - 180 ng/dL 102   Free T4 0.71 - 1.51 ng/dL 1.02         ASSESSMENT/PLAN:  1.  Hypothyroidism- status post treatment with I-131 for Graves' disease.  No significant symptoms. TFT WNL. Continue current Tx.     2. Graves' eye disease- seeing ophthalmology. No vision changes    FOLLOWUP: [facility]  F/U 1 year TSH, Ft4, total T3

## 2022-09-06 NOTE — PROGRESS NOTES
Subjective:       Patient ID: Lexi Chaney is a 59 y.o. female.    Chief Complaint: Establish Care    Here to establish care and for a general exam.    She is overall feeling well.    Following with GI for some abdominal pain and bloating.  xanx does help with her stomach spasms    Graves disease, hypothyroidsm - s/o JOHNS; taking Cytomel and synthroid  Palpitations - taking Toprol XL 25mg 1/2 daily  Anxiety - some goes day and bad days; some anxiety at bedtime; using benadryl at night; using Xanax 0.5mg 1/2 tab PRN insomnia or panic    The 10-year ASCVD risk score (Ernestina VELAZQUEZ Jr., et al., 2013) is: 2.9%    Values used to calculate the score:      Age: 59 years      Sex: Female      Is Non- : No      Diabetic: No      Tobacco smoker: No      Systolic Blood Pressure: 118 mmHg      Is BP treated: No      HDL Cholesterol: 76 mg/dL      Total Cholesterol: 235 mg/dL        Past Medical History:   Diagnosis Date    Abnormal Pap smear     Anxiety     Arthritis     Cataract     Grave's disease     Grave's disease     IBS (irritable bowel syndrome)     rare    Internal hemorrhoids     followed by Dr. Schilling    Nephrolithiasis     followed by Dr. Pope    Thyroid disease     Vitamin D deficiency        Past Surgical History:   Procedure Laterality Date    CATARACT EXTRACTION  11/19/12    right eye (toric)    CATARACT EXTRACTION W/  INTRAOCULAR LENS IMPLANT  11/26/12    left eye (toric)    EXTRACORPOREAL SHOCK WAVE LITHOTRIPSY      EYE SURGERY      eyelid surgery    LITHOTRIPSY      tonsillectomy         Review of patient's allergies indicates:   Allergen Reactions    Sulfa (sulfonamide antibiotics) Itching       Social History     Socioeconomic History    Marital status:      Spouse name: Not on file    Number of children: 2    Years of education: Not on file    Highest education level: Not on file   Occupational History    Occupation: [employer]     Employer: [employer]   Social Needs    Financial resource strain: Not on file    Food insecurity:     Worry: Not on file     Inability: Not on file    Transportation needs:     Medical: Not on file     Non-medical: Not on file   Tobacco Use    Smoking status: Never Smoker    Smokeless tobacco: Never Used   Substance and Sexual Activity    Alcohol use: Yes     Comment: q month    Drug use: No    Sexual activity: Yes     Partners: Male     Birth control/protection: Post-menopausal   Lifestyle    Physical activity:     Days per week: 2 days     Minutes per session: 20 min    Stress: Not on file   Relationships    Social connections:     Talks on phone: Not on file     Gets together: Not on file     Attends [employer] service: Not on file     Active member of club or organization: Not on file     Attends meetings of clubs or organizations: Not on file     Relationship status: Not on file   Other Topics Concern    Not on file   Social History Narrative    Not on file       Current Outpatient Medications on File Prior to Visit   Medication Sig Dispense Refill    cholecalciferol, vitamin D3, 5,000 unit Tab Take 5,000 Units by mouth every other day.      dicyclomine (BENTYL) 20 mg tablet Take 1 tablet (20 mg total) by mouth every 8 (eight) hours as needed. 30 tablet 3    diphenhydrAMINE (BENADRYL) 25 mg capsule Take 25 mg by mouth nightly as needed for Allergies or Insomnia.      liothyronine (CYTOMEL) 5 MCG Tab TAKE 1 TABLET BY MOUTH EVERY DAY 30 tablet 0    liothyronine (CYTOMEL) 5 MCG Tab TAKE 1 TABLET BY MOUTH EVERY DAY 30 tablet 3    metoprolol succinate (TOPROL-XL) 25 MG 24 hr tablet 1/2 po qd 45 tablet 3    SYNTHROID 125 mcg tablet TAKE 1 TABLET BY MOUTH EVERY DAY FOR 6 DAYS PER WEEK AND A HALF-TABLET ON DAY 7 157 tablet 0    [DISCONTINUED] ALPRAZolam (XANAX) 0.5 MG tablet Take 1 tablet (0.5 mg total) by mouth nightly as needed. 30 tablet 0     No current facility-administered medications on file prior to  "visit.        Family History   Problem Relation Age of Onset    Hypertension Father     Stroke Father     Heart disease Mother         valve    Hypertension Mother     Heart disease Brother     Hypertension Brother     Diabetes Brother     Amblyopia Neg Hx     Blindness Neg Hx     Cancer Neg Hx     Cataracts Neg Hx     Glaucoma Neg Hx     Macular degeneration Neg Hx     Retinal detachment Neg Hx     Strabismus Neg Hx     Thyroid disease Neg Hx     Anesthesia problems Neg Hx     Clotting disorder Neg Hx        Review of Systems   Constitutional: Negative for appetite change, chills, fever and unexpected weight change.   HENT: Negative for sore throat and trouble swallowing.    Eyes: Negative for pain and visual disturbance.   Respiratory: Negative for cough, shortness of breath and wheezing.    Cardiovascular: Negative for chest pain and palpitations.   Gastrointestinal: Negative for abdominal pain, blood in stool, diarrhea, nausea and vomiting.   Genitourinary: Negative for difficulty urinating, dysuria and hematuria.   Musculoskeletal: Negative for arthralgias, gait problem and neck pain.   Skin: Negative for rash and wound.   Neurological: Negative for dizziness, weakness, numbness and headaches.   Hematological: Negative for adenopathy.   Psychiatric/Behavioral: Negative for dysphoric mood.       Objective:      /86 (BP Location: Left arm, Patient Position: Sitting, BP Method: Medium (Manual))   Pulse 78   Temp 98.3 °F (36.8 °C) (Oral)   Ht 5' 4" (1.626 m)   Wt 74 kg (163 lb 2.3 oz)   SpO2 96%   BMI 28.00 kg/m²   Physical Exam   Constitutional: She is oriented to person, place, and time. She appears well-developed and well-nourished.   HENT:   Head: Normocephalic.   Mouth/Throat: Oropharynx is clear and moist. No oropharyngeal exudate or posterior oropharyngeal erythema.   Eyes: Pupils are equal, round, and reactive to light. Conjunctivae and EOM are normal.   Neck: Normal range of " motion. Neck supple. No thyromegaly present.   Cardiovascular: Normal rate, regular rhythm, S1 normal, S2 normal, normal heart sounds and intact distal pulses. Exam reveals no gallop and no friction rub.   No murmur heard.  Pulmonary/Chest: Effort normal and breath sounds normal. She has no wheezes. She has no rales.   Abdominal: Soft. Normal appearance and bowel sounds are normal. She exhibits no distension. There is no tenderness.   Musculoskeletal:        Right lower leg: She exhibits no edema.        Left lower leg: She exhibits no edema.   Lymphadenopathy:     She has no cervical adenopathy.   Neurological: She is alert and oriented to person, place, and time. No cranial nerve deficit. Gait normal.   Skin: Skin is warm and intact. No rash noted.   Psychiatric: She has a normal mood and affect.       Results for orders placed or performed in visit on 09/27/19   C-reactive protein   Result Value Ref Range    CRP 1.7 0.0 - 8.2 mg/L   CBC auto differential   Result Value Ref Range    WBC 5.68 3.90 - 12.70 K/uL    RBC 4.57 4.00 - 5.40 M/uL    Hemoglobin 14.1 12.0 - 16.0 g/dL    Hematocrit 42.5 37.0 - 48.5 %    Mean Corpuscular Volume 93 82 - 98 fL    Mean Corpuscular Hemoglobin 30.9 27.0 - 31.0 pg    Mean Corpuscular Hemoglobin Conc 33.2 32.0 - 36.0 g/dL    RDW 12.9 11.5 - 14.5 %    Platelets 325 150 - 350 K/uL    MPV 10.1 9.2 - 12.9 fL    Gran # (ANC) 3.5 1.8 - 7.7 K/uL    Immature Grans (Abs) 0.01 0.00 - 0.04 K/uL    Lymph # 1.6 1.0 - 4.8 K/uL    Mono # 0.5 0.3 - 1.0 K/uL    Eos # 0.1 0.0 - 0.5 K/uL    Baso # 0.05 0.00 - 0.20 K/uL    nRBC 0 0 /100 WBC    Gran% 61.1 38.0 - 73.0 %    Lymph% 27.3 18.0 - 48.0 %    Mono% 9.3 4.0 - 15.0 %    Eosinophil% 1.2 0.0 - 8.0 %    Basophil% 0.9 0.0 - 1.9 %    Differential Method Automated    Comprehensive metabolic panel   Result Value Ref Range    Sodium 139 136 - 145 mmol/L    Potassium 4.4 3.5 - 5.1 mmol/L    Chloride 103 95 - 110 mmol/L    CO2 28 23 - 29 mmol/L    Glucose 91  06-Sep-2022 70 - 110 mg/dL    BUN, Bld 12 8 - 23 mg/dL    Creatinine 0.8 0.5 - 1.4 mg/dL    Calcium 9.8 8.7 - 10.5 mg/dL    Total Protein 7.8 6.0 - 8.4 g/dL    Albumin 4.5 3.5 - 5.2 g/dL    Total Bilirubin 0.6 0.1 - 1.0 mg/dL    Alkaline Phosphatase 89 55 - 135 U/L    AST 16 10 - 40 U/L    ALT 13 10 - 44 U/L    Anion Gap 8 8 - 16 mmol/L    eGFR if African American >60 >60 mL/min/1.73 m^2    eGFR if non African American >60 >60 mL/min/1.73 m^2   IgA   Result Value Ref Range    IgA 171 40 - 350 mg/dL       Assessment:       1. Preventative health care    2. Hyperlipidemia, unspecified hyperlipidemia type    3. Vitamin D deficiency    4. Anxiety    5. Primary insomnia    6. Palpitations        Plan:       Preventative health care    Hyperlipidemia, unspecified hyperlipidemia type  -     Lipid panel; Future; Expected date: 09/27/2019    Vitamin D deficiency  -     Vitamin D; Future; Expected date: 09/27/2019    Anxiety  -     ALPRAZolam (XANAX) 0.5 MG tablet; Take 1 tablet (0.5 mg total) by mouth nightly as needed.  Dispense: 30 tablet; Refill: 0    Primary insomnia  -     ALPRAZolam (XANAX) 0.5 MG tablet; Take 1 tablet (0.5 mg total) by mouth nightly as needed.  Dispense: 30 tablet; Refill: 0    Palpitations      CT abdomen reviewed  Trial of Smooth Move tea  contine Miralax daily  Continue other medications and specialty follow-up  Counseled on regular exercise, maintenance of a healthy weight, balanced diet rich in fruits/vegetables and lean protein, and avoidance of unhealthy habits like smoking and excessive alcohol intake.

## 2022-09-23 ENCOUNTER — OFFICE VISIT (OUTPATIENT)
Dept: FAMILY MEDICINE | Facility: CLINIC | Age: 62
End: 2022-09-23
Payer: COMMERCIAL

## 2022-09-23 ENCOUNTER — TELEPHONE (OUTPATIENT)
Dept: OTOLARYNGOLOGY | Facility: CLINIC | Age: 62
End: 2022-09-23
Payer: COMMERCIAL

## 2022-09-23 VITALS
WEIGHT: 171.75 LBS | SYSTOLIC BLOOD PRESSURE: 126 MMHG | HEART RATE: 69 BPM | OXYGEN SATURATION: 98 % | HEIGHT: 64 IN | DIASTOLIC BLOOD PRESSURE: 80 MMHG | BODY MASS INDEX: 29.32 KG/M2

## 2022-09-23 DIAGNOSIS — H93.13 TINNITUS OF BOTH EARS: Primary | ICD-10-CM

## 2022-09-23 DIAGNOSIS — R42 VERTIGO: ICD-10-CM

## 2022-09-23 PROCEDURE — 3008F BODY MASS INDEX DOCD: CPT | Mod: CPTII,S$GLB,, | Performed by: FAMILY MEDICINE

## 2022-09-23 PROCEDURE — 3079F DIAST BP 80-89 MM HG: CPT | Mod: CPTII,S$GLB,, | Performed by: FAMILY MEDICINE

## 2022-09-23 PROCEDURE — 99999 PR PBB SHADOW E&M-EST. PATIENT-LVL IV: ICD-10-PCS | Mod: PBBFAC,,, | Performed by: FAMILY MEDICINE

## 2022-09-23 PROCEDURE — 1159F MED LIST DOCD IN RCRD: CPT | Mod: CPTII,S$GLB,, | Performed by: FAMILY MEDICINE

## 2022-09-23 PROCEDURE — 1160F RVW MEDS BY RX/DR IN RCRD: CPT | Mod: CPTII,S$GLB,, | Performed by: FAMILY MEDICINE

## 2022-09-23 PROCEDURE — 99214 OFFICE O/P EST MOD 30 MIN: CPT | Mod: S$GLB,,, | Performed by: FAMILY MEDICINE

## 2022-09-23 PROCEDURE — 99214 PR OFFICE/OUTPT VISIT, EST, LEVL IV, 30-39 MIN: ICD-10-PCS | Mod: S$GLB,,, | Performed by: FAMILY MEDICINE

## 2022-09-23 PROCEDURE — 1160F PR REVIEW ALL MEDS BY PRESCRIBER/CLIN PHARMACIST DOCUMENTED: ICD-10-PCS | Mod: CPTII,S$GLB,, | Performed by: FAMILY MEDICINE

## 2022-09-23 PROCEDURE — 99999 PR PBB SHADOW E&M-EST. PATIENT-LVL IV: CPT | Mod: PBBFAC,,, | Performed by: FAMILY MEDICINE

## 2022-09-23 PROCEDURE — 3079F PR MOST RECENT DIASTOLIC BLOOD PRESSURE 80-89 MM HG: ICD-10-PCS | Mod: CPTII,S$GLB,, | Performed by: FAMILY MEDICINE

## 2022-09-23 PROCEDURE — 3074F PR MOST RECENT SYSTOLIC BLOOD PRESSURE < 130 MM HG: ICD-10-PCS | Mod: CPTII,S$GLB,, | Performed by: FAMILY MEDICINE

## 2022-09-23 PROCEDURE — 1159F PR MEDICATION LIST DOCUMENTED IN MEDICAL RECORD: ICD-10-PCS | Mod: CPTII,S$GLB,, | Performed by: FAMILY MEDICINE

## 2022-09-23 PROCEDURE — 3074F SYST BP LT 130 MM HG: CPT | Mod: CPTII,S$GLB,, | Performed by: FAMILY MEDICINE

## 2022-09-23 PROCEDURE — 3008F PR BODY MASS INDEX (BMI) DOCUMENTED: ICD-10-PCS | Mod: CPTII,S$GLB,, | Performed by: FAMILY MEDICINE

## 2022-09-23 RX ORDER — CYCLOBENZAPRINE HCL 5 MG
5 TABLET ORAL
COMMUNITY
End: 2023-09-21 | Stop reason: SDUPTHER

## 2022-09-23 NOTE — PROGRESS NOTES
"Subjective:       Patient ID: Lexi Chaney is a 62 y.o. female.    Chief Complaint: No chief complaint on file.    Is she pleasant 62-year-old female comes in today with the but a week some onset of tinnitus which has reduced hearing somewhat and also some vertigo.  She is having trouble sleeping.  The tinnitus has been there is become extremely loud over the past week or so.  She has no other neurologic symptoms of concern.  Review of systems essentially noncontributory except for the symptoms.  She does have an ENT physician here at [facility].  I do recommend that we do a full evaluation prior to setting a treatment plan in place.    Review of Systems   Constitutional: Negative.    HENT:  Positive for tinnitus.    Eyes: Negative.    Respiratory: Negative.     Cardiovascular: Negative.    Gastrointestinal: Negative.    Endocrine: Negative.    Genitourinary: Negative.    Musculoskeletal: Negative.    Skin: Negative.    Allergic/Immunologic: Negative.    Neurological: Negative.    Hematological: Negative.    Psychiatric/Behavioral: Negative.       Objective:      Vitals:    09/23/22 1423   BP: 126/80   Pulse: 69   SpO2: 98%   Weight: 77.9 kg (171 lb 11.8 oz)   Height: 5' 4" (1.626 m)      Physical Exam    Results for orders placed or performed in visit on 08/29/22   TSH   Result Value Ref Range    TSH 1.889 0.400 - 4.000 uIU/mL   T4, FREE   Result Value Ref Range    Free T4 1.02 0.71 - 1.51 ng/dL   T3   Result Value Ref Range    T3, Total 102 60 - 180 ng/dL   Vitamin D   Result Value Ref Range    Vit D, 25-Hydroxy 31 30 - 96 ng/mL      Assessment:       1. Tinnitus of both ears    2. Vertigo          Plan:       Tinnitus of both ears  -     Ambulatory referral/consult to ENT; Future; Expected date: 09/30/2022    Vertigo  -     Ambulatory referral/consult to ENT; Future; Expected date: 09/30/2022        Medication List with Changes/Refills   Current Medications    ALPRAZOLAM (XANAX) 0.5 MG TABLET    Take 1 tablet " (0.5 mg total) by mouth nightly as needed.    CHOLECALCIFEROL, VITAMIN D3, 5,000 UNIT TAB    Take 5,000 Units by mouth every other day.    CLINDAMYCIN (CLEOCIN) 150 MG CAPSULE    Take by mouth.    CYCLOBENZAPRINE (FLEXERIL) 5 MG TABLET    Take 5 mg by mouth as needed for Muscle spasms.    DICYCLOMINE (BENTYL) 20 MG TABLET    Take 1 tablet (20 mg total) by mouth every 8 (eight) hours as needed.    DIPHENHYDRAMINE (BENADRYL) 25 MG CAPSULE    Take 25 mg by mouth nightly as needed for Allergies or Insomnia.    LIOTHYRONINE (CYTOMEL) 5 MCG TAB    TAKE 1 TABLET BY MOUTH EVERY DAY    METOPROLOL SUCCINATE (TOPROL-XL) 25 MG 24 HR TABLET    TAKE 1 TABLET BY MOUTH EVERY DAY    PROMETHAZINE (PHENERGAN) 25 MG TABLET    Take 1 tablet (25 mg total) by mouth every 4 (four) hours.    SYNTHROID 125 MCG TABLET    TAKE 1 TABLET BY MOUTH EVERY DAY FOR 6 DAYS A WEEK AND 1/2 TABLET ON DAY 7   Discontinued Medications    CLOBETASOL 0.05% (TEMOVATE) 0.05 % OINT    Clobetasol propionate 0.05% once daily at night for 4 weeks, then alternate nights for 4 weeks, and then twice weekly for 4 weeks

## 2022-09-23 NOTE — TELEPHONE ENCOUNTER
----- Message from Mary Vo sent at 9/23/2022  3:53 PM CDT -----  Contact: Pt  Type: Audiogram    Name of Caller:  Pt    Best Call Back Number:  [phone]    Additional Information:  Pt has appt on 9/29 & states she needs audiogram, but that didn't come up in Epic.    Please call back.  Thanks.

## 2022-10-10 ENCOUNTER — PATIENT MESSAGE (OUTPATIENT)
Dept: ADMINISTRATIVE | Facility: HOSPITAL | Age: 62
End: 2022-10-10
Payer: COMMERCIAL

## 2022-10-17 ENCOUNTER — OFFICE VISIT (OUTPATIENT)
Dept: OTOLARYNGOLOGY | Facility: CLINIC | Age: 62
End: 2022-10-17
Payer: COMMERCIAL

## 2022-10-17 ENCOUNTER — CLINICAL SUPPORT (OUTPATIENT)
Dept: AUDIOLOGY | Facility: CLINIC | Age: 62
End: 2022-10-17
Payer: COMMERCIAL

## 2022-10-17 VITALS — BODY MASS INDEX: 29.32 KG/M2 | WEIGHT: 171.75 LBS | HEIGHT: 64 IN

## 2022-10-17 DIAGNOSIS — H93.13 TINNITUS OF BOTH EARS: Primary | ICD-10-CM

## 2022-10-17 DIAGNOSIS — H93.13 BILATERAL TINNITUS: ICD-10-CM

## 2022-10-17 DIAGNOSIS — R42 VERTIGO: ICD-10-CM

## 2022-10-17 DIAGNOSIS — H90.3 BILATERAL HIGH FREQUENCY SENSORINEURAL HEARING LOSS: Primary | ICD-10-CM

## 2022-10-17 DIAGNOSIS — H93.233 HYPERACUSIS OF BOTH EARS: ICD-10-CM

## 2022-10-17 DIAGNOSIS — H90.3 SENSORINEURAL HEARING LOSS (SNHL) OF BOTH EARS: ICD-10-CM

## 2022-10-17 PROCEDURE — 92557 COMPREHENSIVE HEARING TEST: CPT | Mod: S$GLB,,, | Performed by: AUDIOLOGIST

## 2022-10-17 PROCEDURE — 92567 TYMPANOMETRY: CPT | Mod: S$GLB,,, | Performed by: AUDIOLOGIST

## 2022-10-17 PROCEDURE — 1160F RVW MEDS BY RX/DR IN RCRD: CPT | Mod: CPTII,S$GLB,, | Performed by: STUDENT IN AN ORGANIZED HEALTH CARE EDUCATION/TRAINING PROGRAM

## 2022-10-17 PROCEDURE — 99999 PR PBB SHADOW E&M-EST. PATIENT-LVL I: ICD-10-PCS | Mod: PBBFAC,,,

## 2022-10-17 PROCEDURE — 99999 PR PBB SHADOW E&M-EST. PATIENT-LVL I: CPT | Mod: PBBFAC,,,

## 2022-10-17 PROCEDURE — 99213 PR OFFICE/OUTPT VISIT, EST, LEVL III, 20-29 MIN: ICD-10-PCS | Mod: S$GLB,,, | Performed by: STUDENT IN AN ORGANIZED HEALTH CARE EDUCATION/TRAINING PROGRAM

## 2022-10-17 PROCEDURE — 1159F PR MEDICATION LIST DOCUMENTED IN MEDICAL RECORD: ICD-10-PCS | Mod: CPTII,S$GLB,, | Performed by: STUDENT IN AN ORGANIZED HEALTH CARE EDUCATION/TRAINING PROGRAM

## 2022-10-17 PROCEDURE — 1159F MED LIST DOCD IN RCRD: CPT | Mod: CPTII,S$GLB,, | Performed by: STUDENT IN AN ORGANIZED HEALTH CARE EDUCATION/TRAINING PROGRAM

## 2022-10-17 PROCEDURE — 99213 OFFICE O/P EST LOW 20 MIN: CPT | Mod: S$GLB,,, | Performed by: STUDENT IN AN ORGANIZED HEALTH CARE EDUCATION/TRAINING PROGRAM

## 2022-10-17 PROCEDURE — 99999 PR PBB SHADOW E&M-EST. PATIENT-LVL III: ICD-10-PCS | Mod: PBBFAC,,, | Performed by: STUDENT IN AN ORGANIZED HEALTH CARE EDUCATION/TRAINING PROGRAM

## 2022-10-17 PROCEDURE — 92567 PR TYMPA2METRY: ICD-10-PCS | Mod: S$GLB,,, | Performed by: AUDIOLOGIST

## 2022-10-17 PROCEDURE — 1160F PR REVIEW ALL MEDS BY PRESCRIBER/CLIN PHARMACIST DOCUMENTED: ICD-10-PCS | Mod: CPTII,S$GLB,, | Performed by: STUDENT IN AN ORGANIZED HEALTH CARE EDUCATION/TRAINING PROGRAM

## 2022-10-17 PROCEDURE — 99999 PR PBB SHADOW E&M-EST. PATIENT-LVL III: CPT | Mod: PBBFAC,,, | Performed by: STUDENT IN AN ORGANIZED HEALTH CARE EDUCATION/TRAINING PROGRAM

## 2022-10-17 PROCEDURE — 92557 PR COMPREHENSIVE HEARING TEST: ICD-10-PCS | Mod: S$GLB,,, | Performed by: AUDIOLOGIST

## 2022-10-17 RX ORDER — DEXAMETHASONE SODIUM PHOSPHATE 4 MG/ML
12 INJECTION, SOLUTION INTRA-ARTICULAR; INTRALESIONAL; INTRAMUSCULAR; INTRAVENOUS; SOFT TISSUE
Status: SHIPPED | OUTPATIENT
Start: 2022-10-17

## 2022-10-17 RX ORDER — DEXAMETHASONE SODIUM PHOSPHATE 100 MG/10ML
10 INJECTION INTRAMUSCULAR; INTRAVENOUS
Status: DISCONTINUED | OUTPATIENT
Start: 2022-10-17 | End: 2022-10-17

## 2022-10-17 NOTE — PROGRESS NOTES
Lexi Chaney was seen 10/17/2022 for an audiological evaluation.     Pt reported sudden onset tinnitus without decrease in hearing approximately -4 weeks ago.     Otoscopy revealed clear view of both external ear canals and tympanic membranes.  No obstructive cerumen present in either ear canal.       Audiogram results revealed a mild high frequency sensorineural hearing loss bilaterally.  Speech Reception Thresholds were  10 dBHL for the right ear and 15 dBHL for the left ear.    Word recognition scores were excellent for the right ear and excellent for the left ear.   Tympanograms were Type A for the right ear and Type A for the left ear.    Audiogram results were reviewed in detail with patient and all questions were answered. Results will be reviewed by ENT at the completion of this note.     Recommend consideration of binaural amplification pending medical clearance, hearing protection for all loud sounds and annual audiogram to monitor hearing loss.

## 2022-10-17 NOTE — PROGRESS NOTES
Otolaryngology Clinic Note    Subjective:       Patient ID: Lexi Chaney is a 62 y.o. female.    Chief Complaint: Tinnitus      History of Present Illness: Lexi Chaney is a 62 y.o. female presenting with sudden onset tinnitus both ears 4 weeks ago, woke up and noticed constant, very loud ringing in both ears. Bothers her the most at night when it is quiet. No hearing change. Also notes that Yarsanism speakers bother her now, did not both. No ear fullness. No sinus infections, virus. Was dizzy at first, not room spinning. Lasted 3 weeks or so. No change with heartbeat.   Had COVID in Feb. No sick contacts. No new medications. No head trauma.   Only change is sudden diet change- keto/paleo diet about the same time.   Drinking 1 cup coffee in am, decaf tea at lunch. Lots of water.   Has migraines, gets it day after taking flexeril. Has had a few since onset. Did not make ringing worse.   Lots of stress lately. She sleeps well most of the time. Takes benadryl sometimes. Xanax does not help.       Past Surgical History:   Procedure Laterality Date    CATARACT EXTRACTION  11/19/12    right eye (toric)    CATARACT EXTRACTION W/  INTRAOCULAR LENS IMPLANT  11/26/12    left eye (toric)    EPIDURAL STEROID INJECTION INTO LUMBAR SPINE N/A 10/21/2021    Procedure: INJECTION, STEROID, SPINE, LUMBAR, EPIDURAL;  Surgeon: Marshall Lane MD;  Location: [facility] OR;  Service: Pain Management;  Laterality: N/A;  L5-S1    EXTRACORPOREAL SHOCK WAVE LITHOTRIPSY      EYE SURGERY      eyelid surgery    LITHOTRIPSY      REATTACHMENT OF MUSCLE Right 12/27/2019    Procedure: REATTACHMENT, MUSCLE;  Surgeon: Karoline Jaimes MD;  Location: [address];  Service: Ophthalmology;  Laterality: Right;    tonsillectomy      TRANSFORAMINAL EPIDURAL INJECTION OF STEROID Left 3/24/2020    Procedure: Injection,steroid,epidural,transforaminal approach;  Surgeon: Marshall Lane MD;  Location: [facility] OR;  Service: Pain Management;  Laterality: Left;  L4-5, L5-S1     TRANSFORAMINAL EPIDURAL INJECTION OF STEROID Left 7/28/2020    Procedure: INJECTION, STEROID, EPIDURAL, TRANSFORAMINAL APPROACH;  Surgeon: Marshall Lane MD;  Location: [facility];  Service: Pain Management;  Laterality: Left;  L4-5, L5-S1  leave last on the schedule.  will not need to retest for covid.  Verify no fever/off antiinflammatories and no covid symptoms.     Past Medical History:   Diagnosis Date    Abnormal Pap smear     Abnormal Pap smear of cervix     Anxiety     Arthritis     Cataract     Grave's disease     Grave's disease     IBS (irritable bowel syndrome)     rare    Internal hemorrhoids     followed by Dr. Schilling    Nephrolithiasis     followed by Dr. Pope    Thyroid disease     Vitamin D deficiency      Social Determinants of Health     Tobacco Use: Low Risk     Smoking Tobacco Use: Never    Smokeless Tobacco Use: Never    Passive Exposure: Not on file   Alcohol Use: Not At Risk    Frequency of Alcohol Consumption: Monthly or less    Average Number of Drinks: 1 or 2    Frequency of Binge Drinking: Never   Financial Resource Strain: Low Risk     Difficulty of Paying Living Expenses: Not hard at all   Food Insecurity: No Food Insecurity    Worried About Running Out of Food in the Last Year: Never true    Ran Out of Food in the Last Year: Never true   Transportation Needs: No Transportation Needs    Lack of Transportation (Medical): No    Lack of Transportation (Non-Medical): No   Physical Activity: Unknown    Days of Exercise per Week: 2 days    Minutes of Exercise per Session: Not on file   Stress: No Stress Concern Present    Feeling of Stress : Only a little   Social Connections: Unknown    Frequency of Communication with Friends and Family: More than three times a week    Frequency of Social Gatherings with Friends and Family: Once a week    Attends Synagogue Services: Not on file    Active Member of Clubs or Organizations: No    Attends Club or Organization Meetings: More than 4 times per year     Marital Status:    Housing Stability: Low Risk     Unable to Pay for Housing in the Last Year: No    Number of Places Lived in the Last Year: 1    Unstable Housing in the Last Year: No   Depression PHQ-4: High Risk    Last PHQ Score: 9     Review of patient's allergies indicates:   Allergen Reactions    Sulfa (sulfonamide antibiotics) Itching and Other (See Comments)     Hyperventilation    Keflex [cephalexin] Other (See Comments)     Abd pain     Current Outpatient Medications   Medication Instructions    ALPRAZolam (XANAX) 0.5 mg, Oral, Nightly PRN    cholecalciferol (vitamin D3) 5,000 Units, Oral, Every other day    clindamycin (CLEOCIN) 150 MG capsule Oral    cyclobenzaprine (FLEXERIL) 5 mg, Oral, As needed (PRN)    dicyclomine (BENTYL) 20 mg, Oral, Every 8 hours PRN    diphenhydrAMINE (BENADRYL) 25 mg, Oral, Nightly PRN    liothyronine (CYTOMEL) 5 MCG Tab TAKE 1 TABLET BY MOUTH EVERY DAY    metoprolol succinate (TOPROL-XL) 25 MG 24 hr tablet TAKE 1 TABLET BY MOUTH EVERY DAY    promethazine (PHENERGAN) 25 mg, Oral, Every 4 hours    SYNTHROID 125 mcg tablet TAKE 1 TABLET BY MOUTH EVERY DAY FOR 6 DAYS A WEEK AND 1/2 TABLET ON DAY 7       ENT ROS negative except as stated above.             Objective:      There were no vitals filed for this visit.    General: NAD, well appearing  Eyes: Normal conjunctiva and lids  Face: symmetric, nerve intact  Nose: The nose is without any evidence of any deformity. The nasal mucosa is moist. The septum is midline. There is no evidence of septal hematoma. The turbinates are without abnormality.   Ears: The ears are with normal-appearing pinna. Examination of the canals is normal appearing bilaterally. There is no drainage or erythema noted. The tympanic membranes are normal appearing with pearly color, normal-appearing landmarks and normal light reflex. Hearing is grossly intact.  Mouth: No obvious abnormalities to the lips. The teeth are unremarkable. The gingivae are  without any obvious evidence of infection or lesion. The oral mucosa is moist and pink. There are no obvious masses to the hard or soft palate.   Oropharynx: The uvula is midline.  The tongue is midline. The posterior pharynx is without erythema or exudate. The tonsils are normal appearing.  Salivary glands: The salivary glands are symmetric and not enlarged, no masses  Neck: No lymphadenopathy, trachea midline, thryoid not enlarged.  Psych: Normal mood and affect.   Neuro: Grossly intact  Speech: fluent    Test Review: I personally reviewed her audiogram showing mild high frequency SNHL AU, insignificant asymmetry, type A tymps   Discrim 100% AU    Assessment and Plan:       1. Tinnitus of both ears    2. Vertigo    3. Hyperacusis of both ears    4. Sensorineural hearing loss (SNHL) of both ears       Vertigo has resolved, tinnitus present both ears, sudden onset 4 weeks ago. Possible anxiety? With dizziness, possibly a labyrinthitis?, unclear picture. Hearing normal. Discussed that we could get imaging but I am not overly worried about bilateral tinnitus and symmetric hearing. Discussed that tinnitus that lasts longer than 6 months usually does not go away so we will have to give hers time.     Has slight HF SNHL, likely age related. Discussed hyperacusis, no great solution, wear noise protection, sit away from speakers.     She reports intolerance to oral steroids. Can try IM dose today. She deferred.     Recommend talking to PCP about medicine for anxiety as this may be contributing.     White noise machine at night as this is when it bothers her the most. Has fan on at baseline.     Tinnitus measures:  1.  Avoid exposure to loud sounds and noises.  Wear ear protection around loud noises.  2.  Get your blood pressure check regularly.  If it is high, see your doctor.  3.  Decrease salt intake.  4.  Avoid stimulants such as caffeine and tobacco.  5.  Exercise daily to improve circulation.  6.  Get adequate rest.   Avoid fatigue.  7.  Stop worrying about the noise.  Recognize the noise as an annoyance and learned to ignore it as much as possible.  8.  Consider a trial of lipoflavanoids, slow-release niacin, or Arches' Tinnitus Formula (over-the-counter).  9.  Purchase a white noise machine to mask tinnitus.  10. Marshall Barakat, audiologist, is certified in Tinnitus Retraining Therapy. [phone].       RTC: 6 weeks.     Plan of care was discussed in detail with the patient, who agreed with the plan as above. All questions were answered in detail.     Wilma Mulliagn MD  Otolaryngology

## 2022-10-17 NOTE — PATIENT INSTRUCTIONS
Tinnitus measures:  1.  Avoid exposure to loud sounds and noises.  Wear ear protection around loud noises.  2.  Get your blood pressure check regularly.  If it is high, see your doctor.  3.  Decrease salt intake.  4.  Avoid stimulants such as caffeine and tobacco.  5.  Exercise daily to improve circulation.  6.  Get adequate rest.  Avoid fatigue.  7.  Stop worrying about the noise.  Recognize the noise as an annoyance and learned to ignore it as much as possible.  8.  Consider a trial of lipoflavanoids, slow-release niacin, or Arches' Tinnitus Formula (over-the-counter).  9.  Purchase a white noise machine to mask tinnitus.  10. Marshall Barakat, audiologist, is certified in Tinnitus Retraining Therapy. [phone].

## 2022-10-18 ENCOUNTER — OFFICE VISIT (OUTPATIENT)
Dept: FAMILY MEDICINE | Facility: CLINIC | Age: 62
End: 2022-10-18
Payer: COMMERCIAL

## 2022-10-18 VITALS
TEMPERATURE: 99 F | BODY MASS INDEX: 27.96 KG/M2 | WEIGHT: 163.81 LBS | OXYGEN SATURATION: 98 % | DIASTOLIC BLOOD PRESSURE: 72 MMHG | HEIGHT: 64 IN | HEART RATE: 72 BPM | SYSTOLIC BLOOD PRESSURE: 132 MMHG

## 2022-10-18 DIAGNOSIS — F41.9 ANXIETY: ICD-10-CM

## 2022-10-18 DIAGNOSIS — R30.0 DYSURIA: Primary | ICD-10-CM

## 2022-10-18 LAB
BILIRUB SERPL-MCNC: NEGATIVE MG/DL
BLOOD URINE, POC: NEGATIVE
CLARITY, POC UA: CLEAR
COLOR, POC UA: ABNORMAL
GLUCOSE UR QL STRIP: NORMAL
KETONES UR QL STRIP: NEGATIVE
LEUKOCYTE ESTERASE URINE, POC: ABNORMAL
NITRITE, POC UA: NEGATIVE
PH, POC UA: 5
PROTEIN, POC: ABNORMAL
SPECIFIC GRAVITY, POC UA: 1.01
UROBILINOGEN, POC UA: NORMAL

## 2022-10-18 PROCEDURE — 99999 PR PBB SHADOW E&M-EST. PATIENT-LVL IV: CPT | Mod: PBBFAC,,, | Performed by: NURSE PRACTITIONER

## 2022-10-18 PROCEDURE — 99999 PR PBB SHADOW E&M-EST. PATIENT-LVL IV: ICD-10-PCS | Mod: PBBFAC,,, | Performed by: NURSE PRACTITIONER

## 2022-10-18 PROCEDURE — 1160F RVW MEDS BY RX/DR IN RCRD: CPT | Mod: CPTII,S$GLB,, | Performed by: NURSE PRACTITIONER

## 2022-10-18 PROCEDURE — 99213 PR OFFICE/OUTPT VISIT, EST, LEVL III, 20-29 MIN: ICD-10-PCS | Mod: S$GLB,,, | Performed by: NURSE PRACTITIONER

## 2022-10-18 PROCEDURE — 3078F PR MOST RECENT DIASTOLIC BLOOD PRESSURE < 80 MM HG: ICD-10-PCS | Mod: CPTII,S$GLB,, | Performed by: NURSE PRACTITIONER

## 2022-10-18 PROCEDURE — 3075F PR MOST RECENT SYSTOLIC BLOOD PRESS GE 130-139MM HG: ICD-10-PCS | Mod: CPTII,S$GLB,, | Performed by: NURSE PRACTITIONER

## 2022-10-18 PROCEDURE — 1159F MED LIST DOCD IN RCRD: CPT | Mod: CPTII,S$GLB,, | Performed by: NURSE PRACTITIONER

## 2022-10-18 PROCEDURE — 3078F DIAST BP <80 MM HG: CPT | Mod: CPTII,S$GLB,, | Performed by: NURSE PRACTITIONER

## 2022-10-18 PROCEDURE — 81002 URINALYSIS NONAUTO W/O SCOPE: CPT | Mod: S$GLB,,, | Performed by: NURSE PRACTITIONER

## 2022-10-18 PROCEDURE — 1159F PR MEDICATION LIST DOCUMENTED IN MEDICAL RECORD: ICD-10-PCS | Mod: CPTII,S$GLB,, | Performed by: NURSE PRACTITIONER

## 2022-10-18 PROCEDURE — 3075F SYST BP GE 130 - 139MM HG: CPT | Mod: CPTII,S$GLB,, | Performed by: NURSE PRACTITIONER

## 2022-10-18 PROCEDURE — 1160F PR REVIEW ALL MEDS BY PRESCRIBER/CLIN PHARMACIST DOCUMENTED: ICD-10-PCS | Mod: CPTII,S$GLB,, | Performed by: NURSE PRACTITIONER

## 2022-10-18 PROCEDURE — 99213 OFFICE O/P EST LOW 20 MIN: CPT | Mod: S$GLB,,, | Performed by: NURSE PRACTITIONER

## 2022-10-18 PROCEDURE — 81002 POCT URINE DIPSTICK WITHOUT MICROSCOPE: ICD-10-PCS | Mod: S$GLB,,, | Performed by: NURSE PRACTITIONER

## 2022-10-18 RX ORDER — NITROFURANTOIN 25; 75 MG/1; MG/1
100 CAPSULE ORAL 2 TIMES DAILY
Qty: 14 CAPSULE | Refills: 0 | Status: SHIPPED | OUTPATIENT
Start: 2022-10-18 | End: 2022-10-25

## 2022-10-18 NOTE — PROGRESS NOTES
Subjective:       Patient ID: Lexi Chaney is a 62 y.o. female.    Chief Complaint: possible bladder infection     HPI   63 y/o female patient with medical problems listed below presents for dysuria for a few days. States has hx of kidney stones and had lithotripsy done several years ago. Denies smoking. Denies nausea, abdominal pain, fever, chills, hematuria, flank pain.   Patient states was seen in ENT for tinnitus and was told it might be related stress, anxiety. Patient states she is going through divorce which made her stressed out and anxious.     Patient Active Problem List   Diagnosis    Insomnia    Anxiety    Migraines    DJD (degenerative joint disease) of knee    Nephrolithiasis    Hypothyroidism    Plantar fasciitis    Metatarsalgia    Equinus deformity of foot, acquired    Shoulder pain    Vitamin D deficiency    Irritable bowel syndrome without diarrhea    Right shoulder pain    Shoulder stiffness, right    Shoulder weakness    Left shoulder pain    Shoulder stiffness, left    Ptosis    Lumbar radiculitis    DDD (degenerative disc disease), lumbar    Debility    Lichen sclerosus et atrophicus of the vulva      Review of patient's allergies indicates:   Allergen Reactions    Sulfa (sulfonamide antibiotics) Itching and Other (See Comments)     Hyperventilation    Keflex [cephalexin] Other (See Comments)     Abd pain     Past Surgical History:   Procedure Laterality Date    CATARACT EXTRACTION  11/19/12    right eye (toric)    CATARACT EXTRACTION W/  INTRAOCULAR LENS IMPLANT  11/26/12    left eye (toric)    EPIDURAL STEROID INJECTION INTO LUMBAR SPINE N/A 10/21/2021    Procedure: INJECTION, STEROID, SPINE, LUMBAR, EPIDURAL;  Surgeon: Marshall Lane MD;  Location: [facility];  Service: Pain Management;  Laterality: N/A;  L5-S1    EXTRACORPOREAL SHOCK WAVE LITHOTRIPSY      EYE SURGERY      eyelid surgery    LITHOTRIPSY      REATTACHMENT OF MUSCLE Right 12/27/2019    Procedure: REATTACHMENT, MUSCLE;  Surgeon:  Karoline Jaimes MD;  [address];  Service: Ophthalmology;  Laterality: Right;    tonsillectomy      TRANSFORAMINAL EPIDURAL INJECTION OF STEROID Left 3/24/2020    Procedure: Injection,steroid,epidural,transforaminal approach;  Surgeon: Marshall Lane MD;  Location: [facility] OR;  Service: Pain Management;  Laterality: Left;  L4-5, L5-S1    TRANSFORAMINAL EPIDURAL INJECTION OF STEROID Left 7/28/2020    Procedure: INJECTION, STEROID, EPIDURAL, TRANSFORAMINAL APPROACH;  Surgeon: Marshall Lane MD;  Location: [facility] OR;  Service: Pain Management;  Laterality: Left;  L4-5, L5-S1  leave last on the schedule.  will not need to retest for covid.  Verify no fever/off antiinflammatories and no covid symptoms.        Current Outpatient Medications:     ALPRAZolam (XANAX) 0.5 MG tablet, Take 1 tablet (0.5 mg total) by mouth nightly as needed., Disp: 30 tablet, Rfl: 0    cholecalciferol, vitamin D3, 5,000 unit Tab, Take 5,000 Units by mouth every other day., Disp: , Rfl:     clindamycin (CLEOCIN) 150 MG capsule, Take by mouth., Disp: , Rfl:     cyclobenzaprine (FLEXERIL) 5 MG tablet, Take 5 mg by mouth as needed for Muscle spasms., Disp: , Rfl:     dicyclomine (BENTYL) 20 mg tablet, Take 1 tablet (20 mg total) by mouth every 8 (eight) hours as needed., Disp: 30 tablet, Rfl: 3    diphenhydrAMINE (BENADRYL) 25 mg capsule, Take 25 mg by mouth nightly as needed for Allergies or Insomnia., Disp: , Rfl:     liothyronine (CYTOMEL) 5 MCG Tab, TAKE 1 TABLET BY MOUTH EVERY DAY, Disp: 90 tablet, Rfl: 1    metoprolol succinate (TOPROL-XL) 25 MG 24 hr tablet, TAKE 1 TABLET BY MOUTH EVERY DAY, Disp: 90 tablet, Rfl: 3    promethazine (PHENERGAN) 25 MG tablet, Take 1 tablet (25 mg total) by mouth every 4 (four) hours., Disp: 30 tablet, Rfl: 0    SYNTHROID 125 mcg tablet, TAKE 1 TABLET BY MOUTH EVERY DAY FOR 6 DAYS A WEEK AND 1/2 TABLET ON DAY 7, Disp: 84 tablet, Rfl: 2    nitrofurantoin, macrocrystal-monohydrate, (MACROBID) 100 MG capsule,  "Take 1 capsule (100 mg total) by mouth 2 (two) times daily. for 7 days, Disp: 14 capsule, Rfl: 0    Current Facility-Administered Medications:     dexAMETHasone injection 12 mg, 12 mg, Intramuscular, 1 time in Clinic/HOD, Wilma Mulligan MD    Lab Results   Component Value Date    WBC 6.17 10/29/2020    HGB 13.6 10/29/2020    HCT 41.5 10/29/2020     10/29/2020    CHOL 234 (H) 05/12/2022    TRIG 105 05/12/2022    HDL 73 05/12/2022    ALT 18 05/12/2022    AST 18 05/12/2022     05/12/2022    K 4.3 05/12/2022     05/12/2022    CREATININE 0.7 05/12/2022    BUN 12 05/12/2022    CO2 26 05/12/2022    TSH 1.889 08/29/2022    INR 1.0 10/21/2004    HGBA1C 5.4 02/02/2018     Above labs reviewed    Review of Systems   Constitutional:  Negative for chills and fever.   Respiratory:  Negative for cough, chest tightness and shortness of breath.    Cardiovascular:  Negative for chest pain and palpitations.   Gastrointestinal:  Negative for abdominal pain.   Genitourinary:  Positive for dysuria. Negative for flank pain and hematuria.   Neurological:  Negative for dizziness and headaches.       Objective:   /72 (BP Location: Right arm, Patient Position: Sitting, BP Method: Medium (Manual))   Pulse 72   Temp 99 °F (37.2 °C) (Oral)   Ht 5' 4" (1.626 m)   Wt 74.3 kg (163 lb 12.8 oz)   SpO2 98%   BMI 28.12 kg/m²       Physical Exam  Constitutional:       General: She is not in acute distress.     Appearance: Normal appearance.   HENT:      Head: Atraumatic.   Cardiovascular:      Rate and Rhythm: Normal rate and regular rhythm.      Pulses: Normal pulses.      Heart sounds: Normal heart sounds.   Pulmonary:      Effort: Pulmonary effort is normal.      Breath sounds: Normal breath sounds.   Abdominal:      General: Abdomen is flat. Bowel sounds are normal.      Palpations: Abdomen is soft.   Skin:     General: Skin is warm and dry.   Neurological:      General: No focal deficit present.      Mental Status: " She is alert and oriented to person, place, and time.   Psychiatric:         Mood and Affect: Mood normal.       Assessment:       1. Dysuria    2. Anxiety        Plan:       1. Dysuria  - Urinalysis  - Urine culture  - POCT urine dipstick without microscope: + leuk and neg for nit  - nitrofurantoin, macrocrystal-monohydrate, (MACROBID) 100 MG capsule; Take 1 capsule (100 mg total) by mouth 2 (two) times daily. for 7 days  Dispense: 14 capsule; Refill: 0    2. Anxiety  - Patient agreed on therapy and refer to   Ambulatory referral/consult to Psychiatry; Future     Patient with be reevaluated in  as needed  or sooner silviano Celaya NP

## 2022-10-19 ENCOUNTER — LAB VISIT (OUTPATIENT)
Dept: LAB | Facility: HOSPITAL | Age: 62
End: 2022-10-19
Attending: FAMILY MEDICINE
Payer: COMMERCIAL

## 2022-10-19 DIAGNOSIS — R30.0 DYSURIA: Primary | ICD-10-CM

## 2022-10-19 LAB
BILIRUB UR QL STRIP: NEGATIVE
CLARITY UR REFRACT.AUTO: CLEAR
COLOR UR AUTO: NORMAL
GLUCOSE UR QL STRIP: NEGATIVE
HGB UR QL STRIP: NEGATIVE
KETONES UR QL STRIP: NEGATIVE
LEUKOCYTE ESTERASE UR QL STRIP: NEGATIVE
NITRITE UR QL STRIP: NEGATIVE
PH UR STRIP: 7 [PH] (ref 5–8)
PROT UR QL STRIP: NEGATIVE
SP GR UR STRIP: 1 (ref 1–1.03)
URN SPEC COLLECT METH UR: NORMAL

## 2022-10-19 PROCEDURE — 81003 URINALYSIS AUTO W/O SCOPE: CPT | Performed by: NURSE PRACTITIONER

## 2022-10-24 ENCOUNTER — PATIENT OUTREACH (OUTPATIENT)
Dept: ADMINISTRATIVE | Facility: HOSPITAL | Age: 62
End: 2022-10-24
Payer: COMMERCIAL

## 2022-10-24 NOTE — PROGRESS NOTES
10/24/2022 Care Everywhere updates requested and reviewed.  Immunizations reconciled. Media reports reviewed.  Duplicate HM overrides and  orders removed.  Overdue HM topic chart audit and/or requested.  Overdue lab testing linked to upcoming lab appointments if applies.  RECENTLY OUTREACHED/REVIEWED    Health Maintenance Due   Topic Date Due    COVID-19 Vaccine (1) Never done    Mammogram  2020    Influenza Vaccine (1) 2022

## 2022-10-27 ENCOUNTER — PATIENT MESSAGE (OUTPATIENT)
Dept: PSYCHIATRY | Facility: CLINIC | Age: 62
End: 2022-10-27
Payer: COMMERCIAL

## 2022-11-10 ENCOUNTER — OFFICE VISIT (OUTPATIENT)
Dept: FAMILY MEDICINE | Facility: CLINIC | Age: 62
End: 2022-11-10
Payer: COMMERCIAL

## 2022-11-10 DIAGNOSIS — F51.01 PRIMARY INSOMNIA: ICD-10-CM

## 2022-11-10 DIAGNOSIS — F41.9 ANXIETY: Primary | ICD-10-CM

## 2022-11-10 PROCEDURE — 99213 PR OFFICE/OUTPT VISIT, EST, LEVL III, 20-29 MIN: ICD-10-PCS | Mod: 95,,, | Performed by: FAMILY MEDICINE

## 2022-11-10 PROCEDURE — 99213 OFFICE O/P EST LOW 20 MIN: CPT | Mod: 95,,, | Performed by: FAMILY MEDICINE

## 2022-11-10 PROCEDURE — 1160F RVW MEDS BY RX/DR IN RCRD: CPT | Mod: CPTII,95,, | Performed by: FAMILY MEDICINE

## 2022-11-10 PROCEDURE — 1159F MED LIST DOCD IN RCRD: CPT | Mod: CPTII,95,, | Performed by: FAMILY MEDICINE

## 2022-11-10 PROCEDURE — 1160F PR REVIEW ALL MEDS BY PRESCRIBER/CLIN PHARMACIST DOCUMENTED: ICD-10-PCS | Mod: CPTII,95,, | Performed by: FAMILY MEDICINE

## 2022-11-10 PROCEDURE — 1159F PR MEDICATION LIST DOCUMENTED IN MEDICAL RECORD: ICD-10-PCS | Mod: CPTII,95,, | Performed by: FAMILY MEDICINE

## 2022-11-10 RX ORDER — ALPRAZOLAM 0.25 MG/1
0.25 TABLET ORAL NIGHTLY
Qty: 30 TABLET | Refills: 5 | Status: SHIPPED | OUTPATIENT
Start: 2022-11-10 | End: 2023-04-07 | Stop reason: SDUPTHER

## 2022-11-10 NOTE — PROGRESS NOTES
Subjective:       Patient ID: Lexi Chaney is a 62 y.o. female.    Chief Complaint: No chief complaint on file.    Here for f/u on chronic health issues    The patient location is: [place]  The chief complaint leading to consultation is: anxiety    Visit type: audiovisual    Face to Face time with patient: 15 minutes of total time spent on the encounter, which includes face to face time and non-face to face time preparing to see the patient (eg, review of tests), Obtaining and/or reviewing separately obtained history, Documenting clinical information in the electronic or other health record, Independently interpreting results (not separately reported) and communicating results to the patient/family/caregiver, or Care coordination (not separately reported).     Each patient to whom he or she provides medical services by telemedicine is:  (1) informed of the relationship between the physician and patient and the respective role of any other health care provider with respect to management of the patient; and (2) notified that he or she may decline to receive medical services by telemedicine and may withdraw from such care at any time.    Notes:     She is overall feeling well.    Graves disease, hypothyroidsm - s/o JOHNS; taking Cytomel and synthroid; following with endocrinology  Palpitations - taking Toprol XL 25mg daily  Anxiety - some goes day and bad days; some anxiety at bedtime; using benadryl at night; using Xanax 0.5mg 1/2 tab insomnia or panic  Uses xana primarily after getting steroid injections for control of her shoulder and low back pains.  Migraines with visual symptoms - stable    Flexeril causes some headaches    Some increase anxiety related to being  from her           Medication Refill  Pertinent negatives include no abdominal pain, arthralgias, chest pain, chills, coughing, fever, headaches, joint swelling, nausea, neck pain, numbness, rash, sore throat, vomiting or weakness.     Past  Medical History:   Diagnosis Date    Abnormal Pap smear     Abnormal Pap smear of cervix     Anxiety     Arthritis     Cataract     Grave's disease     Grave's disease     IBS (irritable bowel syndrome)     rare    Internal hemorrhoids     followed by Dr. Schilling    Nephrolithiasis     followed by Dr. Pope    Thyroid disease     Vitamin D deficiency        Past Surgical History:   Procedure Laterality Date    CATARACT EXTRACTION  11/19/12    right eye (toric)    CATARACT EXTRACTION W/  INTRAOCULAR LENS IMPLANT  11/26/12    left eye (toric)    EPIDURAL STEROID INJECTION INTO LUMBAR SPINE N/A 10/21/2021    Procedure: INJECTION, STEROID, SPINE, LUMBAR, EPIDURAL;  Surgeon: Marshall Lane MD;  Location: [facility];  Service: Pain Management;  Laterality: N/A;  L5-S1    EXTRACORPOREAL SHOCK WAVE LITHOTRIPSY      EYE SURGERY      eyelid surgery    LITHOTRIPSY      REATTACHMENT OF MUSCLE Right 12/27/2019    Procedure: REATTACHMENT, MUSCLE;  Surgeon: Karoline Jaimes MD;  Location: [address];  Service: Ophthalmology;  Laterality: Right;    tonsillectomy      TRANSFORAMINAL EPIDURAL INJECTION OF STEROID Left 3/24/2020    Procedure: Injection,steroid,epidural,transforaminal approach;  Surgeon: Marshall Lane MD;  Location: [facility];  Service: Pain Management;  Laterality: Left;  L4-5, L5-S1    TRANSFORAMINAL EPIDURAL INJECTION OF STEROID Left 7/28/2020    Procedure: INJECTION, STEROID, EPIDURAL, TRANSFORAMINAL APPROACH;  Surgeon: Marshall Lane MD;  Location: [facility];  Service: Pain Management;  Laterality: Left;  L4-5, L5-S1  leave last on the schedule.  will not need to retest for covid.  Verify no fever/off antiinflammatories and no covid symptoms.       Review of patient's allergies indicates:   Allergen Reactions    Sulfa (sulfonamide antibiotics) Itching and Other (See Comments)     Hyperventilation    Keflex [cephalexin] Other (See Comments)     Abd pain       Social History     Socioeconomic History    Marital status:      Number of children: 2   Occupational History    Occupation: [employer]     Employer: [employer]   Tobacco Use    Smoking status: Never    Smokeless tobacco: Never   Substance and Sexual Activity    Alcohol use: Yes     Comment: q month    Drug use: No    Sexual activity: Yes     Partners: Male     Birth control/protection: Post-menopausal     Social Determinants of Health     Financial Resource Strain: Low Risk     Difficulty of Paying Living Expenses: Not hard at all   Food Insecurity: No Food Insecurity    Worried About Running Out of Food in the Last Year: Never true    Ran Out of Food in the Last Year: Never true   Transportation Needs: No Transportation Needs    Lack of Transportation (Medical): No    Lack of Transportation (Non-Medical): No   Physical Activity: Sufficiently Active    Days of Exercise per Week: 6 days    Minutes of Exercise per Session: 60 min   Stress: No Stress Concern Present    Feeling of Stress : Only a little   Social Connections: Unknown    Frequency of Communication with Friends and Family: More than three times a week    Frequency of Social Gatherings with Friends and Family: Once a week    Active Member of Clubs or Organizations: Yes    Attends Club or Organization Meetings: Patient refused    Marital Status:    Housing Stability: Low Risk     Unable to Pay for Housing in the Last Year: No    Number of Places Lived in the Last Year: 1    Unstable Housing in the Last Year: No       Current Outpatient Medications on File Prior to Visit   Medication Sig Dispense Refill    cholecalciferol, vitamin D3, 5,000 unit Tab Take 5,000 Units by mouth every other day.      cyclobenzaprine (FLEXERIL) 5 MG tablet Take 5 mg by mouth as needed for Muscle spasms.      dicyclomine (BENTYL) 20 mg tablet Take 1 tablet (20 mg total) by mouth every 8 (eight) hours as needed. 30 tablet 3    diphenhydrAMINE (BENADRYL) 25 mg capsule Take 25 mg by mouth nightly as needed for Allergies  or Insomnia.      liothyronine (CYTOMEL) 5 MCG Tab TAKE 1 TABLET BY MOUTH EVERY DAY 90 tablet 1    metoprolol succinate (TOPROL-XL) 25 MG 24 hr tablet TAKE 1 TABLET BY MOUTH EVERY DAY 90 tablet 3    promethazine (PHENERGAN) 25 MG tablet Take 1 tablet (25 mg total) by mouth every 4 (four) hours. 30 tablet 0    SYNTHROID 125 mcg tablet TAKE 1 TABLET BY MOUTH EVERY DAY FOR 6 DAYS A WEEK AND 1/2 TABLET ON DAY 7 84 tablet 2     Current Facility-Administered Medications on File Prior to Visit   Medication Dose Route Frequency Provider Last Rate Last Admin    dexAMETHasone injection 12 mg  12 mg Intramuscular 1 time in Clinic/HOD Wilma Mulligan MD           Family History   Problem Relation Age of Onset    Hypertension Father     Stroke Father     Heart disease Mother         valve    Hypertension Mother     Heart disease Brother     Hypertension Brother     Diabetes Brother     Amblyopia Neg Hx     Blindness Neg Hx     Cancer Neg Hx     Cataracts Neg Hx     Glaucoma Neg Hx     Macular degeneration Neg Hx     Retinal detachment Neg Hx     Strabismus Neg Hx     Thyroid disease Neg Hx     Anesthesia problems Neg Hx     Clotting disorder Neg Hx     Breast cancer Neg Hx     Ovarian cancer Neg Hx        Review of Systems   Constitutional:  Positive for activity change. Negative for appetite change, chills, fever and unexpected weight change.   HENT:  Positive for hearing loss. Negative for rhinorrhea, sore throat and trouble swallowing.    Eyes:  Negative for pain, discharge and visual disturbance.   Respiratory:  Negative for cough, chest tightness, shortness of breath and wheezing.    Cardiovascular:  Negative for chest pain and palpitations.   Gastrointestinal:  Negative for abdominal pain, blood in stool, constipation, diarrhea, nausea and vomiting.   Endocrine: Negative for polydipsia and polyuria.   Genitourinary:  Negative for difficulty urinating, dysuria, hematuria and menstrual problem.   Musculoskeletal:  Negative  for arthralgias, gait problem, joint swelling and neck pain.   Skin:  Negative for rash and wound.   Neurological:  Negative for dizziness, weakness, numbness and headaches.   Hematological:  Negative for adenopathy.   Psychiatric/Behavioral:  Negative for confusion and dysphoric mood. The patient is nervous/anxious.      Objective:      There were no vitals taken for this visit.  Physical Exam  Constitutional:       Appearance: She is well-developed.   HENT:      Head: Normocephalic and atraumatic.   Eyes:      Pupils: Pupils are equal, round, and reactive to light.   Pulmonary:      Effort: Pulmonary effort is normal.   Musculoskeletal:      Cervical back: Neck supple.   Neurological:      Mental Status: She is alert and oriented to person, place, and time.   Psychiatric:         Behavior: Behavior normal.         Thought Content: Thought content normal.         Judgment: Judgment normal.       Results for orders placed or performed in visit on 10/19/22   Urinalysis   Result Value Ref Range    Specimen UA Urine, Clean Catch     Color, UA Straw Yellow, Straw, Earnestine    Appearance, UA Clear Clear    pH, UA 7.0 5.0 - 8.0    Specific Gravity, UA 1.005 1.005 - 1.030    Protein, UA Negative Negative    Glucose, UA Negative Negative    Ketones, UA Negative Negative    Bilirubin (UA) Negative Negative    Occult Blood UA Negative Negative    Nitrite, UA Negative Negative    Leukocytes, UA Negative Negative       Assessment:       1. Anxiety    2. Primary insomnia          Plan:       Anxiety  -     ALPRAZolam (XANAX) 0.25 MG tablet; Take 1 tablet (0.25 mg total) by mouth every evening.  Dispense: 30 tablet; Refill: 5    Primary insomnia  -     ALPRAZolam (XANAX) 0.25 MG tablet; Take 1 tablet (0.25 mg total) by mouth every evening.  Dispense: 30 tablet; Refill: 5        Stop flexeril due to headaches  Ok with xanax 0.25mg QHS  Continue other current medications and specialty follow-up  Counseled on regular exercise,  maintenance of a healthy weight, balanced diet rich in fruits/vegetables and lean protein, and avoidance of unhealthy habits like smoking and excessive alcohol intake.

## 2022-12-15 ENCOUNTER — OFFICE VISIT (OUTPATIENT)
Dept: OTOLARYNGOLOGY | Facility: CLINIC | Age: 62
End: 2022-12-15
Payer: COMMERCIAL

## 2022-12-15 VITALS — BODY MASS INDEX: 25.11 KG/M2 | HEIGHT: 64 IN | WEIGHT: 147.06 LBS

## 2022-12-15 DIAGNOSIS — H93.13 TINNITUS OF BOTH EARS: Primary | ICD-10-CM

## 2022-12-15 DIAGNOSIS — H93.233 HYPERACUSIS OF BOTH EARS: ICD-10-CM

## 2022-12-15 DIAGNOSIS — H90.3 SENSORINEURAL HEARING LOSS (SNHL) OF BOTH EARS: ICD-10-CM

## 2022-12-15 PROCEDURE — 99213 OFFICE O/P EST LOW 20 MIN: CPT | Mod: S$GLB,,, | Performed by: STUDENT IN AN ORGANIZED HEALTH CARE EDUCATION/TRAINING PROGRAM

## 2022-12-15 PROCEDURE — 99213 PR OFFICE/OUTPT VISIT, EST, LEVL III, 20-29 MIN: ICD-10-PCS | Mod: S$GLB,,, | Performed by: STUDENT IN AN ORGANIZED HEALTH CARE EDUCATION/TRAINING PROGRAM

## 2022-12-15 PROCEDURE — 99999 PR PBB SHADOW E&M-EST. PATIENT-LVL III: ICD-10-PCS | Mod: PBBFAC,,, | Performed by: STUDENT IN AN ORGANIZED HEALTH CARE EDUCATION/TRAINING PROGRAM

## 2022-12-15 PROCEDURE — 3008F PR BODY MASS INDEX (BMI) DOCUMENTED: ICD-10-PCS | Mod: CPTII,S$GLB,, | Performed by: STUDENT IN AN ORGANIZED HEALTH CARE EDUCATION/TRAINING PROGRAM

## 2022-12-15 PROCEDURE — 99999 PR PBB SHADOW E&M-EST. PATIENT-LVL III: CPT | Mod: PBBFAC,,, | Performed by: STUDENT IN AN ORGANIZED HEALTH CARE EDUCATION/TRAINING PROGRAM

## 2022-12-15 PROCEDURE — 3008F BODY MASS INDEX DOCD: CPT | Mod: CPTII,S$GLB,, | Performed by: STUDENT IN AN ORGANIZED HEALTH CARE EDUCATION/TRAINING PROGRAM

## 2022-12-15 PROCEDURE — 1159F MED LIST DOCD IN RCRD: CPT | Mod: CPTII,S$GLB,, | Performed by: STUDENT IN AN ORGANIZED HEALTH CARE EDUCATION/TRAINING PROGRAM

## 2022-12-15 PROCEDURE — 1159F PR MEDICATION LIST DOCUMENTED IN MEDICAL RECORD: ICD-10-PCS | Mod: CPTII,S$GLB,, | Performed by: STUDENT IN AN ORGANIZED HEALTH CARE EDUCATION/TRAINING PROGRAM

## 2022-12-15 NOTE — PROGRESS NOTES
Otolaryngology Clinic Note    Subjective:       Patient ID: Lexi Chaney is a 62 y.o. female.    Chief Complaint: Follow-up and Tinnitus        History of Present Illness: Lexi Chaney is a 62 y.o. female presenting with sudden onset tinnitus both ears 4 weeks ago, woke up and noticed constant, very loud ringing in both ears. Bothers her the most at night when it is quiet. No hearing change. Also notes that Latter day speakers bother her now, did not both. No ear fullness. No sinus infections, virus. Was dizzy at first, not room spinning. Lasted 3 weeks or so. No change with heartbeat.   Had COVID in Feb. No sick contacts. No new medications. No head trauma.   Only change is sudden diet change- keto/paleo diet about the same time.   Drinking 1 cup coffee in am, decaf tea at lunch. Lots of water.   Has migraines, gets it day after taking flexeril. Has had a few since onset. Did not make ringing worse.   Lots of stress lately. She sleeps well most of the time. Takes benadryl sometimes. Xanax does not help.     12/15/22: No dizziness, ringing in both ears. Mildly bothersome, when quiet. Doing a fan. Has been taking xanax nightly and not helping but helping her sleep. Sometimes needs people to repeat because ringing is distracting. Loud noises still bothering her. Airpods help with hearing and ringing.       Past Surgical History:   Procedure Laterality Date    CATARACT EXTRACTION  11/19/12    right eye (toric)    CATARACT EXTRACTION W/  INTRAOCULAR LENS IMPLANT  11/26/12    left eye (toric)    EPIDURAL STEROID INJECTION INTO LUMBAR SPINE N/A 10/21/2021    Procedure: INJECTION, STEROID, SPINE, LUMBAR, EPIDURAL;  Surgeon: Marshall Lane MD;  Location: [facility] OR;  Service: Pain Management;  Laterality: N/A;  L5-S1    EXTRACORPOREAL SHOCK WAVE LITHOTRIPSY      EYE SURGERY      eyelid surgery    LITHOTRIPSY      REATTACHMENT OF MUSCLE Right 12/27/2019    Procedure: REATTACHMENT, MUSCLE;  Surgeon: Karoline Jaimes MD;  Location:  [facility] OR 1ST FLR;  Service: Ophthalmology;  Laterality: Right;    tonsillectomy      TRANSFORAMINAL EPIDURAL INJECTION OF STEROID Left 3/24/2020    Procedure: Injection,steroid,epidural,transforaminal approach;  Surgeon: Marshall Lane MD;  Location: [facility] OR;  Service: Pain Management;  Laterality: Left;  L4-5, L5-S1    TRANSFORAMINAL EPIDURAL INJECTION OF STEROID Left 7/28/2020    Procedure: INJECTION, STEROID, EPIDURAL, TRANSFORAMINAL APPROACH;  Surgeon: Marshall Lane MD;  Location: [facility] OR;  Service: Pain Management;  Laterality: Left;  L4-5, L5-S1  leave last on the schedule.  will not need to retest for covid.  Verify no fever/off antiinflammatories and no covid symptoms.     Past Medical History:   Diagnosis Date    Abnormal Pap smear     Abnormal Pap smear of cervix     Anxiety     Arthritis     Cataract     Grave's disease     Grave's disease     IBS (irritable bowel syndrome)     rare    Internal hemorrhoids     followed by Dr. Schilling    Nephrolithiasis     followed by Dr. Pope    Thyroid disease     Vitamin D deficiency      Social Determinants of Health     Tobacco Use: Low Risk     Smoking Tobacco Use: Never    Smokeless Tobacco Use: Never    Passive Exposure: Not on file   Alcohol Use: Not At Risk    Frequency of Alcohol Consumption: Never    Average Number of Drinks: Patient does not drink    Frequency of Binge Drinking: Never   Financial Resource Strain: Low Risk     Difficulty of Paying Living Expenses: Not hard at all   Food Insecurity: No Food Insecurity    Worried About Running Out of Food in the Last Year: Never true    Ran Out of Food in the Last Year: Never true   Transportation Needs: No Transportation Needs    Lack of Transportation (Medical): No    Lack of Transportation (Non-Medical): No   Physical Activity: Sufficiently Active    Days of Exercise per Week: 6 days    Minutes of Exercise per Session: 60 min   Stress: No Stress Concern Present    Feeling of Stress : Only a little   Social  Connections: Unknown    Frequency of Communication with Friends and Family: More than three times a week    Frequency of Social Gatherings with Friends and Family: Once a week    Attends Jain Services: Not on file    Active Member of Clubs or Organizations: Yes    Attends Club or Organization Meetings: Patient refused    Marital Status:    Housing Stability: Low Risk     Unable to Pay for Housing in the Last Year: No    Number of Places Lived in the Last Year: 1    Unstable Housing in the Last Year: No   Depression: High Risk    Last PHQ Score: 9     Review of patient's allergies indicates:   Allergen Reactions    Sulfa (sulfonamide antibiotics) Itching and Other (See Comments)     Hyperventilation    Keflex [cephalexin] Other (See Comments)     Abd pain     Current Outpatient Medications   Medication Instructions    ALPRAZolam (XANAX) 0.25 mg, Oral, Nightly    cholecalciferol (vitamin D3) 5,000 Units, Oral, Every other day    cyclobenzaprine (FLEXERIL) 5 mg, Oral, As needed (PRN)    dicyclomine (BENTYL) 20 mg, Oral, Every 8 hours PRN    diphenhydrAMINE (BENADRYL) 25 mg, Oral, Nightly PRN    liothyronine (CYTOMEL) 5 MCG Tab TAKE 1 TABLET BY MOUTH EVERY DAY    metoprolol succinate (TOPROL-XL) 25 MG 24 hr tablet TAKE 1 TABLET BY MOUTH EVERY DAY    promethazine (PHENERGAN) 25 mg, Oral, Every 4 hours    SYNTHROID 125 mcg tablet TAKE 1 TABLET BY MOUTH EVERY DAY FOR 6 DAYS A WEEK AND 1/2 TABLET ON DAY 7       ENT ROS negative except as stated above.             Objective:      There were no vitals filed for this visit.    General: NAD, well appearing  Eyes: Normal conjunctiva and lids  Face: symmetric, nerve intact  Nose: The nose is without any evidence of any deformity. The nasal mucosa is moist. The septum is midline. There is no evidence of septal hematoma. The turbinates are without abnormality.   Ears: The ears are with normal-appearing pinna. Examination of the canals is normal appearing bilaterally.  There is no drainage or erythema noted. The tympanic membranes are normal appearing with pearly color, normal-appearing landmarks and normal light reflex. Hearing is grossly intact.  Mouth: No obvious abnormalities to the lips. The teeth are unremarkable. The gingivae are without any obvious evidence of infection or lesion. The oral mucosa is moist and pink. There are no obvious masses to the hard or soft palate.   Oropharynx: The uvula is midline.  The tongue is midline. The posterior pharynx is without erythema or exudate. The tonsils are normal appearing.  Salivary glands: The salivary glands are symmetric and not enlarged, no masses  Neck: No lymphadenopathy, trachea midline, thryoid not enlarged.  Psych: Normal mood and affect.   Neuro: Grossly intact  Speech: fluent    Test Review: I personally reviewed her audiogram showing mild high frequency SNHL AU, insignificant asymmetry, type A tymps   Discrim 100% AU        Assessment and Plan:       1. Tinnitus of both ears    2. Hyperacusis of both ears    3. Sensorineural hearing loss (SNHL) of both ears         Vertigo has resolved, tinnitus present both ears, sudden onset now present for 3 months but dizziness resolved. Discussed that tinnitus that lasts longer than 6 months usually does not go away so we will have to give hers time.     Has slight HF SNHL, likely age related. Discussed hyperacusis, no great solution, wear noise protection, sit away from speakers.     She is taking xanax nightly, going through divorce, less anxious than she was.     White noise machine at night as this is when it bothers her the most. Has fan on at baseline. Could consider hearing aids. Discussed how this does sometimes help with ringing.     Tinnitus measures:  1.  Avoid exposure to loud sounds and noises.  Wear ear protection around loud noises.  2.  Get your blood pressure check regularly.  If it is high, see your doctor.  3.  Decrease salt intake.  4.  Avoid stimulants such as  caffeine and tobacco.  5.  Exercise daily to improve circulation.  6.  Get adequate rest.  Avoid fatigue.  7.  Stop worrying about the noise.  Recognize the noise as an annoyance and learned to ignore it as much as possible.  8.  Consider a trial of lipoflavanoids, slow-release niacin, or Arches' Tinnitus Formula (over-the-counter).  9.  Purchase a white noise machine to mask tinnitus.  10. Marshall Barakat, audiologist, is certified in Tinnitus Retraining Therapy. [phone].       RTC: 1 year with audio    Plan of care was discussed in detail with the patient, who agreed with the plan as above. All questions were answered in detail.     Wilma Mulligan MD  Otolaryngology

## 2023-01-17 ENCOUNTER — PATIENT MESSAGE (OUTPATIENT)
Dept: ADMINISTRATIVE | Facility: HOSPITAL | Age: 63
End: 2023-01-17
Payer: COMMERCIAL

## 2023-02-23 ENCOUNTER — OFFICE VISIT (OUTPATIENT)
Dept: OBSTETRICS AND GYNECOLOGY | Facility: CLINIC | Age: 63
End: 2023-02-23
Payer: COMMERCIAL

## 2023-02-23 VITALS
WEIGHT: 141.56 LBS | DIASTOLIC BLOOD PRESSURE: 70 MMHG | SYSTOLIC BLOOD PRESSURE: 114 MMHG | BODY MASS INDEX: 24.29 KG/M2

## 2023-02-23 DIAGNOSIS — N95.2 VAGINAL ATROPHY: Primary | ICD-10-CM

## 2023-02-23 PROCEDURE — 99213 OFFICE O/P EST LOW 20 MIN: CPT | Mod: S$GLB,,, | Performed by: OBSTETRICS & GYNECOLOGY

## 2023-02-23 PROCEDURE — 3074F SYST BP LT 130 MM HG: CPT | Mod: CPTII,S$GLB,, | Performed by: OBSTETRICS & GYNECOLOGY

## 2023-02-23 PROCEDURE — 1159F PR MEDICATION LIST DOCUMENTED IN MEDICAL RECORD: ICD-10-PCS | Mod: CPTII,S$GLB,, | Performed by: OBSTETRICS & GYNECOLOGY

## 2023-02-23 PROCEDURE — 3008F PR BODY MASS INDEX (BMI) DOCUMENTED: ICD-10-PCS | Mod: CPTII,S$GLB,, | Performed by: OBSTETRICS & GYNECOLOGY

## 2023-02-23 PROCEDURE — 99213 PR OFFICE/OUTPT VISIT, EST, LEVL III, 20-29 MIN: ICD-10-PCS | Mod: S$GLB,,, | Performed by: OBSTETRICS & GYNECOLOGY

## 2023-02-23 PROCEDURE — 99999 PR PBB SHADOW E&M-EST. PATIENT-LVL III: ICD-10-PCS | Mod: PBBFAC,,, | Performed by: OBSTETRICS & GYNECOLOGY

## 2023-02-23 PROCEDURE — 3078F DIAST BP <80 MM HG: CPT | Mod: CPTII,S$GLB,, | Performed by: OBSTETRICS & GYNECOLOGY

## 2023-02-23 PROCEDURE — 3008F BODY MASS INDEX DOCD: CPT | Mod: CPTII,S$GLB,, | Performed by: OBSTETRICS & GYNECOLOGY

## 2023-02-23 PROCEDURE — 99999 PR PBB SHADOW E&M-EST. PATIENT-LVL III: CPT | Mod: PBBFAC,,, | Performed by: OBSTETRICS & GYNECOLOGY

## 2023-02-23 PROCEDURE — 3078F PR MOST RECENT DIASTOLIC BLOOD PRESSURE < 80 MM HG: ICD-10-PCS | Mod: CPTII,S$GLB,, | Performed by: OBSTETRICS & GYNECOLOGY

## 2023-02-23 PROCEDURE — 1159F MED LIST DOCD IN RCRD: CPT | Mod: CPTII,S$GLB,, | Performed by: OBSTETRICS & GYNECOLOGY

## 2023-02-23 PROCEDURE — 3074F PR MOST RECENT SYSTOLIC BLOOD PRESSURE < 130 MM HG: ICD-10-PCS | Mod: CPTII,S$GLB,, | Performed by: OBSTETRICS & GYNECOLOGY

## 2023-02-23 RX ORDER — ESTRADIOL 0.1 MG/G
CREAM VAGINAL
Qty: 42.5 G | Refills: 1 | Status: SHIPPED | OUTPATIENT
Start: 2023-02-23 | End: 2023-06-22 | Stop reason: SDUPTHER

## 2023-02-23 NOTE — PROGRESS NOTES
Chief Complaint   Patient presents with    Follow-up         History of Present Illness   62 y.o. F patient presents today for follow up from Lichen sclerosis.      H/o Vaginal Issue:   C/o itching & burning in vaginal area  x6 months  Intermittent    S/p Vulvar Bx:   Path: Benign atrophic squamous mucosa with features suggestive of Lichen sclerosis et atrophicus. Negative for neoplasia or malignancy     At last visit, Rx sent for Clobetasol propionate 0.05% once daily at night for 4 weeks, then alternate nights for 4 weeks, and then twice weekly for 4 weeks    Last pap 10/2021 NILM/HPV neg    I have reviewed the patient's medical history in detail and updated the computerized patient record.  Review of patient's allergies indicates:   Allergen Reactions    Sulfa (sulfonamide antibiotics) Itching and Other (See Comments)     Hyperventilation    Keflex [cephalexin] Other (See Comments)     Abd pain     Review of Systems  Constitutional: negative for chills and fatigue  Gastrointestinal: negative for abdominal pain, constipation, diarrhea, nausea and vomiting  Genitourinary:negative for abnormal menstrual periods, genital lesions and vaginal discharge, dysuria, frequency and hematuria    Physical Examination:  /70   Wt 64.2 kg (141 lb 8.6 oz)   BMI 24.29 kg/m²    Physical Exam:   Constitutional: She appears well-developed and well-nourished. No distress.             Abdominal: Soft. There is no abdominal tenderness.     Genitourinary:    Inguinal canal, vagina, uterus, right adnexa and left adnexa normal.   The external female genitalia was normal.   Labial bartholins normal.Cervix is normal. Vaginal atrophy noted.                      Assessment/Plan:  Vaginal atrophy  -     estradioL (ESTRACE) 0.01 % (0.1 mg/gram) vaginal cream; Place 2 g vaginally every evening for 14 days, THEN 1 g every evening for 14 days, THEN 1 g twice a week.  Dispense: 42.5 g; Refill: 1    Continue topical steroid course PRN.    Start vaginal estrogen.   This disorder, also called lichen sclerosus et atrophicus (LSA), causes the vulvar skin to become very thin and itchy. The risk of vulvar cancer appears to be slightly increased by LSA, with about 4% of women having LSA later developing vulvar cancer.    Follow up in 1 year.

## 2023-04-07 ENCOUNTER — PATIENT MESSAGE (OUTPATIENT)
Dept: FAMILY MEDICINE | Facility: CLINIC | Age: 63
End: 2023-04-07
Payer: COMMERCIAL

## 2023-04-07 DIAGNOSIS — F41.9 ANXIETY: ICD-10-CM

## 2023-04-07 DIAGNOSIS — F51.01 PRIMARY INSOMNIA: ICD-10-CM

## 2023-04-07 NOTE — TELEPHONE ENCOUNTER
No new care gaps identified.  [facility] Embedded Care Gaps. Reference number: [id number]. 4/07/2023   9:41:17 AM NEILT

## 2023-04-08 RX ORDER — ALPRAZOLAM 0.25 MG/1
0.25 TABLET ORAL NIGHTLY
Qty: 30 TABLET | Refills: 5 | Status: SHIPPED | OUTPATIENT
Start: 2023-04-08 | End: 2023-10-30 | Stop reason: SDUPTHER

## 2023-06-21 ENCOUNTER — PATIENT MESSAGE (OUTPATIENT)
Dept: OBSTETRICS AND GYNECOLOGY | Facility: CLINIC | Age: 63
End: 2023-06-21
Payer: COMMERCIAL

## 2023-06-21 DIAGNOSIS — N95.2 VAGINAL ATROPHY: Primary | ICD-10-CM

## 2023-06-22 RX ORDER — ESTRADIOL 0.1 MG/G
1 CREAM VAGINAL
Qty: 42.5 G | Refills: 1 | Status: SHIPPED | OUTPATIENT
Start: 2023-06-22 | End: 2023-07-24

## 2023-07-18 RX ORDER — LIOTHYRONINE SODIUM 5 UG/1
TABLET ORAL
Qty: 90 TABLET | Refills: 1 | Status: SHIPPED | OUTPATIENT
Start: 2023-07-18 | End: 2024-01-16 | Stop reason: SDUPTHER

## 2023-07-24 ENCOUNTER — OFFICE VISIT (OUTPATIENT)
Dept: OBSTETRICS AND GYNECOLOGY | Facility: CLINIC | Age: 63
End: 2023-07-24
Payer: COMMERCIAL

## 2023-07-24 VITALS
BODY MASS INDEX: 22.59 KG/M2 | DIASTOLIC BLOOD PRESSURE: 70 MMHG | SYSTOLIC BLOOD PRESSURE: 130 MMHG | WEIGHT: 131.63 LBS

## 2023-07-24 DIAGNOSIS — N90.4 LICHEN SCLEROSUS ET ATROPHICUS OF THE VULVA: ICD-10-CM

## 2023-07-24 DIAGNOSIS — N95.2 ATROPHIC VAGINITIS: Primary | ICD-10-CM

## 2023-07-24 PROCEDURE — 1159F MED LIST DOCD IN RCRD: CPT | Mod: CPTII,S$GLB,, | Performed by: OBSTETRICS & GYNECOLOGY

## 2023-07-24 PROCEDURE — 99213 PR OFFICE/OUTPT VISIT, EST, LEVL III, 20-29 MIN: ICD-10-PCS | Mod: S$GLB,,, | Performed by: OBSTETRICS & GYNECOLOGY

## 2023-07-24 PROCEDURE — 99999 PR PBB SHADOW E&M-EST. PATIENT-LVL III: ICD-10-PCS | Mod: PBBFAC,,, | Performed by: OBSTETRICS & GYNECOLOGY

## 2023-07-24 PROCEDURE — 1159F PR MEDICATION LIST DOCUMENTED IN MEDICAL RECORD: ICD-10-PCS | Mod: CPTII,S$GLB,, | Performed by: OBSTETRICS & GYNECOLOGY

## 2023-07-24 PROCEDURE — 3075F PR MOST RECENT SYSTOLIC BLOOD PRESS GE 130-139MM HG: ICD-10-PCS | Mod: CPTII,S$GLB,, | Performed by: OBSTETRICS & GYNECOLOGY

## 2023-07-24 PROCEDURE — 3008F PR BODY MASS INDEX (BMI) DOCUMENTED: ICD-10-PCS | Mod: CPTII,S$GLB,, | Performed by: OBSTETRICS & GYNECOLOGY

## 2023-07-24 PROCEDURE — 3008F BODY MASS INDEX DOCD: CPT | Mod: CPTII,S$GLB,, | Performed by: OBSTETRICS & GYNECOLOGY

## 2023-07-24 PROCEDURE — 3078F PR MOST RECENT DIASTOLIC BLOOD PRESSURE < 80 MM HG: ICD-10-PCS | Mod: CPTII,S$GLB,, | Performed by: OBSTETRICS & GYNECOLOGY

## 2023-07-24 PROCEDURE — 99213 OFFICE O/P EST LOW 20 MIN: CPT | Mod: S$GLB,,, | Performed by: OBSTETRICS & GYNECOLOGY

## 2023-07-24 PROCEDURE — 3075F SYST BP GE 130 - 139MM HG: CPT | Mod: CPTII,S$GLB,, | Performed by: OBSTETRICS & GYNECOLOGY

## 2023-07-24 PROCEDURE — 99999 PR PBB SHADOW E&M-EST. PATIENT-LVL III: CPT | Mod: PBBFAC,,, | Performed by: OBSTETRICS & GYNECOLOGY

## 2023-07-24 PROCEDURE — 3078F DIAST BP <80 MM HG: CPT | Mod: CPTII,S$GLB,, | Performed by: OBSTETRICS & GYNECOLOGY

## 2023-07-24 RX ORDER — ESTRADIOL 10 UG/1
1 INSERT VAGINAL
Qty: 24 TABLET | Refills: 3 | Status: SHIPPED | OUTPATIENT
Start: 2023-08-23 | End: 2023-09-21 | Stop reason: ALTCHOICE

## 2023-07-24 RX ORDER — ESTRADIOL 10 UG/1
INSERT VAGINAL
Qty: 18 TABLET | Refills: 0 | Status: SHIPPED | OUTPATIENT
Start: 2023-07-24 | End: 2023-08-21

## 2023-07-24 RX ORDER — CLOBETASOL PROPIONATE 0.5 MG/G
OINTMENT TOPICAL
Qty: 45 G | Refills: 1 | Status: SHIPPED | OUTPATIENT
Start: 2023-07-24

## 2023-07-24 NOTE — PROGRESS NOTES
Chief Complaint   Patient presents with    vaginal irritation     Patient reports vaginal irritation, inflamed and red; patient reports started a week ago       History of Present Illness   62 y.o. F patient presents today for vaginal irritation.     She has not been sexually active in 10 years  Recently got  a few weeks ago.   At that time she stopped using her vaginal estrogen and admits to twice daily intercourse.   She complains of vaginal irritation, inflammation, and redness.   States she cannot use her vaginal estrogen currently due to burning.     Past medical and surgical history reviewed.   I have reviewed the patient's medical history in detail and updated the computerized patient record.  Review of patient's allergies indicates:   Allergen Reactions    Sulfa (sulfonamide antibiotics) Itching and Other (See Comments)     Hyperventilation    Keflex [cephalexin] Other (See Comments)     Abd pain       Review of Systems  Constitutional: negative for chills and fatigue  Gastrointestinal: negative for abdominal pain, constipation, diarrhea, nausea and vomiting  Genitourinary:negative for abnormal menstrual periods, genital lesions and vaginal discharge, dysuria, frequency and hematuria    Physical Examination:  /70   Wt 59.7 kg (131 lb 9.8 oz)   BMI 22.59 kg/m²    Physical Exam:   Constitutional: She appears well-developed and well-nourished. No distress.             Abdominal: Soft. There is no abdominal tenderness.     Genitourinary:    Inguinal canal, vagina, uterus, right adnexa and left adnexa normal.   The external female genitalia was normal.   Labial bartholins normal.Cervix is normal. Vaginal atrophy noted.                   Assessment/Plan:  Atrophic vaginitis  -     estradioL (VAGIFEM) 10 mcg Tab; Place 1 tablet (10 mcg total) vaginally nightly for 14 days, THEN 1 tablet (10 mcg total) twice a week for 14 days.  Dispense: 18 tablet; Refill: 0  -     estradioL (VAGIFEM) 10 mcg Tab;  Place 1 tablet (10 mcg total) vaginally twice a week.  Dispense: 24 tablet; Refill: 3    Lichen sclerosus et atrophicus of the vulva  -     clobetasol 0.05% (TEMOVATE) 0.05 % Oint; Clobetasol propionate 0.05% once daily at night for 4 weeks, then alternate nights for 4 weeks, and then twice weekly for 4 weeks  Dispense: 45 g; Refill: 1      Advised no signs of infection. Skin is atrophic and irritated, likely due to history of atrophy and recent intercourse. Patient states vaginal estrogen burn. Will switch from cream to the pill. Start vaginal pill, night x2 weeks, then twice weekly.     Also restart a course of topical steroid ointment due history of lichen with can be contributing to the irritation.

## 2023-07-27 ENCOUNTER — PATIENT MESSAGE (OUTPATIENT)
Dept: ENDOCRINOLOGY | Facility: CLINIC | Age: 63
End: 2023-07-27
Payer: COMMERCIAL

## 2023-07-28 ENCOUNTER — PATIENT MESSAGE (OUTPATIENT)
Dept: ENDOCRINOLOGY | Facility: CLINIC | Age: 63
End: 2023-07-28
Payer: COMMERCIAL

## 2023-07-28 ENCOUNTER — LAB VISIT (OUTPATIENT)
Dept: LAB | Facility: HOSPITAL | Age: 63
End: 2023-07-28
Attending: INTERNAL MEDICINE
Payer: COMMERCIAL

## 2023-07-28 DIAGNOSIS — E03.9 HYPOTHYROIDISM, UNSPECIFIED TYPE: ICD-10-CM

## 2023-07-28 LAB
T3 SERPL-MCNC: 83 NG/DL (ref 60–180)
T4 FREE SERPL-MCNC: 1.09 NG/DL (ref 0.71–1.51)
TSH SERPL DL<=0.005 MIU/L-ACNC: 0.64 UIU/ML (ref 0.4–4)

## 2023-07-28 PROCEDURE — 84443 ASSAY THYROID STIM HORMONE: CPT | Performed by: INTERNAL MEDICINE

## 2023-07-28 PROCEDURE — 36415 COLL VENOUS BLD VENIPUNCTURE: CPT | Mod: PO | Performed by: INTERNAL MEDICINE

## 2023-07-28 PROCEDURE — 84439 ASSAY OF FREE THYROXINE: CPT | Performed by: INTERNAL MEDICINE

## 2023-07-28 PROCEDURE — 84480 ASSAY TRIIODOTHYRONINE (T3): CPT | Performed by: INTERNAL MEDICINE

## 2023-07-31 ENCOUNTER — OFFICE VISIT (OUTPATIENT)
Dept: ENDOCRINOLOGY | Facility: CLINIC | Age: 63
End: 2023-07-31
Payer: COMMERCIAL

## 2023-07-31 DIAGNOSIS — E03.9 HYPOTHYROIDISM, UNSPECIFIED TYPE: Primary | ICD-10-CM

## 2023-07-31 PROCEDURE — 1159F PR MEDICATION LIST DOCUMENTED IN MEDICAL RECORD: ICD-10-PCS | Mod: CPTII,95,, | Performed by: INTERNAL MEDICINE

## 2023-07-31 PROCEDURE — 99214 PR OFFICE/OUTPT VISIT, EST, LEVL IV, 30-39 MIN: ICD-10-PCS | Mod: 95,,, | Performed by: INTERNAL MEDICINE

## 2023-07-31 PROCEDURE — 1159F MED LIST DOCD IN RCRD: CPT | Mod: CPTII,95,, | Performed by: INTERNAL MEDICINE

## 2023-07-31 PROCEDURE — 1160F PR REVIEW ALL MEDS BY PRESCRIBER/CLIN PHARMACIST DOCUMENTED: ICD-10-PCS | Mod: CPTII,95,, | Performed by: INTERNAL MEDICINE

## 2023-07-31 PROCEDURE — 99214 OFFICE O/P EST MOD 30 MIN: CPT | Mod: 95,,, | Performed by: INTERNAL MEDICINE

## 2023-07-31 PROCEDURE — 1160F RVW MEDS BY RX/DR IN RCRD: CPT | Mod: CPTII,95,, | Performed by: INTERNAL MEDICINE

## 2023-07-31 NOTE — PROGRESS NOTES
The patient location is: [place]    Visit type: audiovisual    Face to Face time with patient: 11  14 minutes of total time spent on the encounter, which includes face to face time and non-face to face time preparing to see the patient (eg, review of tests), Obtaining and/or reviewing separately obtained history, Documenting clinical information in the electronic or other health record, Independently interpreting results (not separately reported) and communicating results to the patient/family/caregiver, or Care coordination (not separately reported).         Each patient to whom he or she provides medical services by telemedicine is:  (1) informed of the relationship between the physician and patient and the respective role of any other health care provider with respect to management of the patient; and (2) notified that he or she may decline to receive medical services by telemedicine and may withdraw from such care at any time.    Notes:       Notes:   CHIEF COMPLAINT: Hypothyroidism/status post Graves  62 y.o.  female here for followup. Currently on Synthroid 125 mcg 6 days a week and 1/2 tablet on day 7 and Cytomel 5 daily. Has tried generic in the past and had difficulty tolerating. States starting a new insurance and will be going to generic. No palpitations. NO tremors. No diarrhea or constipation. No heat/cold intolerance.       PAST MEDICAL HISTORY: Graves treated with radioactive iodine in 2000. Also had Graves eye disease    PAST SURGICAL HISTORY: 3 surgeries for Graves eye disease, tonsillectomy, lithotripsy    SOCIAL HISTORY: Does not smoke or drink    FAMILY HISTORY: Hypothyroidism, heart disease, diabetes. Incidentally  had Graves' disease as well    MEDICATIONS/ALLERGIES: The patient's MedCard has been updated and reviewed.         PE: Virtual Visit         Latest Reference Range & Units 07/28/23 15:30   TSH 0.400 - 4.000 uIU/mL 0.643   T3, Total 60 - 180 ng/dL 83   Free T4 0.71 - 1.51 ng/dL 1.09            ASSESSMENT/PLAN:  1. Hypothyroidism- status post treatment with I-131 for Graves' disease.  No significant symptoms, but states will be changing back to generic due to insurance. Discussed calling if any symptoms. Discussed differences in brand vs generic. TFT WNL. Continue current Tx. Discussed the possibility of f/u with PCP. Since switching to generic will see her back chanda year and re discuss at that time.     2. Graves' eye disease- seeing ophthalmology. No vision changes    FOLLOWUP: [facility]  F/U 1 year TSH, Ft4, total T3

## 2023-08-12 NOTE — OP NOTE
PROCEDURE DATE: 7/28/2020    PROCEDURE: Left L4-5 and L5-S1 transforaminal epidural steroid injection under fluoroscopy    DIAGNOSIS: Lumbar disc displacement without myelopathy  Post op diagnosis: Same    PHYSICIAN: Marshall Lane MD    MEDICATIONS INJECTED:  Dexamethasone 5mg (0.5ml) and 1.5ml 0.25% bupivicaine at each nerve root.     LOCAL ANESTHETIC INJECTED:  Lidocaine 1%. 2 ml per site.    SEDATION MEDICATIONS: RN IV sedation    ESTIMATED BLOOD LOSS:  None    COMPLICATIONS:  None    TECHNIQUE:   A time-out was taken to identify patient and procedure side prior to starting the procedure. The patient was placed in a prone position, prepped and draped in the usual sterile fashion using ChloraPrep and sterile towels.  The area to be injected was determined under fluoroscopic guidance in AP and oblique view.  Local anesthetic was given by raising a wheal and going down to the hub of a 25-gauge 1.5 inch needle.  In oblique view, a 3.5 inch 22-gauge bent-tip spinal needle was introduced towards 6 oclock position of the pedicle of each above named nerve root level.  The needle was walked medially then hinged into the neural foramen and position was confirmed in AP and lateral views.  1ml contrast dye was injected to confirm appropriate placement and that there was no vascular uptake.  After negative aspiration for blood or CSF, the medication was then injected. This was performed at the left L4-5 and L5-S1 level(s). The patient tolerated the procedure well.    The patient was monitored after the procedure.  Patient was given post procedure and discharge instructions to follow at home. The patient was discharged in a stable condition.     Name band;

## 2023-08-28 ENCOUNTER — PATIENT OUTREACH (OUTPATIENT)
Dept: ADMINISTRATIVE | Facility: HOSPITAL | Age: 63
End: 2023-08-28
Payer: COMMERCIAL

## 2023-08-28 ENCOUNTER — PATIENT MESSAGE (OUTPATIENT)
Dept: ADMINISTRATIVE | Facility: HOSPITAL | Age: 63
End: 2023-08-28
Payer: COMMERCIAL

## 2023-09-18 ENCOUNTER — PATIENT MESSAGE (OUTPATIENT)
Dept: FAMILY MEDICINE | Facility: CLINIC | Age: 63
End: 2023-09-18
Payer: MEDICARE

## 2023-09-18 DIAGNOSIS — Z00.00 PREVENTATIVE HEALTH CARE: Primary | ICD-10-CM

## 2023-09-20 ENCOUNTER — LAB VISIT (OUTPATIENT)
Dept: LAB | Facility: HOSPITAL | Age: 63
End: 2023-09-20
Attending: FAMILY MEDICINE
Payer: MEDICARE

## 2023-09-20 DIAGNOSIS — Z78.0 ASYMPTOMATIC MENOPAUSAL STATE: ICD-10-CM

## 2023-09-20 DIAGNOSIS — Z00.00 PREVENTATIVE HEALTH CARE: ICD-10-CM

## 2023-09-20 LAB
ALBUMIN SERPL BCP-MCNC: 4.1 G/DL (ref 3.5–5.2)
ALP SERPL-CCNC: 73 U/L (ref 55–135)
ALT SERPL W/O P-5'-P-CCNC: 16 U/L (ref 10–44)
ANION GAP SERPL CALC-SCNC: 14 MMOL/L (ref 8–16)
AST SERPL-CCNC: 17 U/L (ref 10–40)
BASOPHILS # BLD AUTO: 0.06 K/UL (ref 0–0.2)
BASOPHILS NFR BLD: 0.7 % (ref 0–1.9)
BILIRUB SERPL-MCNC: 0.4 MG/DL (ref 0.1–1)
BUN SERPL-MCNC: 14 MG/DL (ref 8–23)
CALCIUM SERPL-MCNC: 10 MG/DL (ref 8.7–10.5)
CHLORIDE SERPL-SCNC: 100 MMOL/L (ref 95–110)
CHOLEST SERPL-MCNC: 219 MG/DL (ref 120–199)
CHOLEST SERPL-MCNC: 219 MG/DL (ref 120–199)
CHOLEST/HDLC SERPL: 2.8 {RATIO} (ref 2–5)
CHOLEST/HDLC SERPL: 2.8 {RATIO} (ref 2–5)
CO2 SERPL-SCNC: 24 MMOL/L (ref 23–29)
CREAT SERPL-MCNC: 0.7 MG/DL (ref 0.5–1.4)
DIFFERENTIAL METHOD: ABNORMAL
EOSINOPHIL # BLD AUTO: 0.1 K/UL (ref 0–0.5)
EOSINOPHIL NFR BLD: 1.2 % (ref 0–8)
ERYTHROCYTE [DISTWIDTH] IN BLOOD BY AUTOMATED COUNT: 13 % (ref 11.5–14.5)
EST. GFR  (NO RACE VARIABLE): >60 ML/MIN/1.73 M^2
ESTIMATED AVG GLUCOSE: 103 MG/DL (ref 68–131)
GLUCOSE SERPL-MCNC: 80 MG/DL (ref 70–110)
HBA1C MFR BLD: 5.2 % (ref 4–5.6)
HCT VFR BLD AUTO: 41.3 % (ref 37–48.5)
HDLC SERPL-MCNC: 79 MG/DL (ref 40–75)
HDLC SERPL-MCNC: 79 MG/DL (ref 40–75)
HDLC SERPL: 36.1 % (ref 20–50)
HDLC SERPL: 36.1 % (ref 20–50)
HGB BLD-MCNC: 13.6 G/DL (ref 12–16)
IMM GRANULOCYTES # BLD AUTO: 0.02 K/UL (ref 0–0.04)
IMM GRANULOCYTES NFR BLD AUTO: 0.2 % (ref 0–0.5)
LDLC SERPL CALC-MCNC: 114.2 MG/DL (ref 63–159)
LDLC SERPL CALC-MCNC: 114.2 MG/DL (ref 63–159)
LYMPHOCYTES # BLD AUTO: 1.8 K/UL (ref 1–4.8)
LYMPHOCYTES NFR BLD: 20.9 % (ref 18–48)
MCH RBC QN AUTO: 31.6 PG (ref 27–31)
MCHC RBC AUTO-ENTMCNC: 32.9 G/DL (ref 32–36)
MCV RBC AUTO: 96 FL (ref 82–98)
MONOCYTES # BLD AUTO: 0.8 K/UL (ref 0.3–1)
MONOCYTES NFR BLD: 9.6 % (ref 4–15)
NEUTROPHILS # BLD AUTO: 5.6 K/UL (ref 1.8–7.7)
NEUTROPHILS NFR BLD: 67.4 % (ref 38–73)
NONHDLC SERPL-MCNC: 140 MG/DL
NONHDLC SERPL-MCNC: 140 MG/DL
NRBC BLD-RTO: 0 /100 WBC
PLATELET # BLD AUTO: 360 K/UL (ref 150–450)
PMV BLD AUTO: 11.1 FL (ref 9.2–12.9)
POTASSIUM SERPL-SCNC: 4 MMOL/L (ref 3.5–5.1)
PROT SERPL-MCNC: 7.3 G/DL (ref 6–8.4)
RBC # BLD AUTO: 4.3 M/UL (ref 4–5.4)
SODIUM SERPL-SCNC: 138 MMOL/L (ref 136–145)
TRIGL SERPL-MCNC: 129 MG/DL (ref 30–150)
TRIGL SERPL-MCNC: 129 MG/DL (ref 30–150)
TSH SERPL DL<=0.005 MIU/L-ACNC: 0.54 UIU/ML (ref 0.4–4)
WBC # BLD AUTO: 8.37 K/UL (ref 3.9–12.7)

## 2023-09-20 PROCEDURE — 80061 LIPID PANEL: CPT | Performed by: FAMILY MEDICINE

## 2023-09-20 PROCEDURE — 80053 COMPREHEN METABOLIC PANEL: CPT | Performed by: FAMILY MEDICINE

## 2023-09-20 PROCEDURE — 83036 HEMOGLOBIN GLYCOSYLATED A1C: CPT | Performed by: FAMILY MEDICINE

## 2023-09-20 PROCEDURE — 84443 ASSAY THYROID STIM HORMONE: CPT | Performed by: FAMILY MEDICINE

## 2023-09-20 PROCEDURE — 36415 COLL VENOUS BLD VENIPUNCTURE: CPT | Mod: PO | Performed by: FAMILY MEDICINE

## 2023-09-20 PROCEDURE — 85025 COMPLETE CBC W/AUTO DIFF WBC: CPT | Performed by: FAMILY MEDICINE

## 2023-09-21 ENCOUNTER — OFFICE VISIT (OUTPATIENT)
Dept: FAMILY MEDICINE | Facility: CLINIC | Age: 63
End: 2023-09-21
Payer: MEDICARE

## 2023-09-21 VITALS
HEART RATE: 68 BPM | BODY MASS INDEX: 22.7 KG/M2 | HEIGHT: 64 IN | RESPIRATION RATE: 18 BRPM | SYSTOLIC BLOOD PRESSURE: 126 MMHG | WEIGHT: 132.94 LBS | TEMPERATURE: 98 F | DIASTOLIC BLOOD PRESSURE: 66 MMHG | OXYGEN SATURATION: 100 %

## 2023-09-21 DIAGNOSIS — F51.01 PRIMARY INSOMNIA: ICD-10-CM

## 2023-09-21 DIAGNOSIS — F41.9 ANXIETY: Primary | ICD-10-CM

## 2023-09-21 DIAGNOSIS — Z12.31 BREAST CANCER SCREENING BY MAMMOGRAM: ICD-10-CM

## 2023-09-21 PROCEDURE — 99213 OFFICE O/P EST LOW 20 MIN: CPT | Mod: PBBFAC,PO | Performed by: FAMILY MEDICINE

## 2023-09-21 PROCEDURE — 99999 PR PBB SHADOW E&M-EST. PATIENT-LVL III: CPT | Mod: PBBFAC,,, | Performed by: FAMILY MEDICINE

## 2023-09-21 PROCEDURE — 99213 OFFICE O/P EST LOW 20 MIN: CPT | Mod: S$PBB,,, | Performed by: FAMILY MEDICINE

## 2023-09-21 PROCEDURE — 99213 PR OFFICE/OUTPT VISIT, EST, LEVL III, 20-29 MIN: ICD-10-PCS | Mod: S$PBB,,, | Performed by: FAMILY MEDICINE

## 2023-09-21 PROCEDURE — 99999 PR PBB SHADOW E&M-EST. PATIENT-LVL III: ICD-10-PCS | Mod: PBBFAC,,, | Performed by: FAMILY MEDICINE

## 2023-09-21 RX ORDER — CYCLOBENZAPRINE HCL 5 MG
5 TABLET ORAL
Qty: 30 TABLET | Refills: 5 | Status: SHIPPED | OUTPATIENT
Start: 2023-09-21

## 2023-09-21 RX ORDER — ESTRADIOL 0.1 MG/G
1 CREAM VAGINAL
COMMUNITY
Start: 2023-09-11 | End: 2023-12-15 | Stop reason: SDUPTHER

## 2023-09-21 RX ORDER — DICYCLOMINE HYDROCHLORIDE 20 MG/1
20 TABLET ORAL EVERY 8 HOURS PRN
Qty: 30 TABLET | Refills: 3 | Status: SHIPPED | OUTPATIENT
Start: 2023-09-21

## 2023-09-21 NOTE — PROGRESS NOTES
Subjective:       Patient ID: Lexi Amezquita is a 63 y.o. female.    Chief Complaint: Preventative Health Care (Physical and refills)    Here for a general exam and f/u on chronic health issues    Lost 40 pounds in the last year with diet and exercise.    She is overall feeling well.    Graves disease, hypothyroidsm - s/p JOHNS; taking Cytomel and synthroid; following with endocrinology  Palpitations, PVCs  - taking Toprol XL 25mg daily  Anxiety - some goes day and bad days; some anxiety at bedtime; using benadryl at night; using Xanax 0.5mg 1/2 tab PRN insomnia or panic  Uses xanax primarily after getting steroid injections for control of her shoulder and low back pains.  Also needs refill of flexeril     and remarried.  Also has a new job.      Medication Refill  Pertinent negatives include no abdominal pain, arthralgias, chest pain, chills, coughing, fever, headaches, nausea, neck pain, numbness, rash, sore throat, vomiting or weakness.       Past Medical History:   Diagnosis Date    Abnormal Pap smear     Abnormal Pap smear of cervix     Anxiety     Arthritis     Cataract     Grave's disease     Grave's disease     IBS (irritable bowel syndrome)     rare    Internal hemorrhoids     followed by Dr. Schilling    Nephrolithiasis     followed by Dr. Pope    Thyroid disease     Vitamin D deficiency        Past Surgical History:   Procedure Laterality Date    CATARACT EXTRACTION  11/19/12    right eye (toric)    CATARACT EXTRACTION W/  INTRAOCULAR LENS IMPLANT  11/26/12    left eye (toric)    EPIDURAL STEROID INJECTION INTO LUMBAR SPINE N/A 10/21/2021    Procedure: INJECTION, STEROID, SPINE, LUMBAR, EPIDURAL;  Surgeon: Marshall Lane MD;  Location: [facility];  Service: Pain Management;  Laterality: N/A;  L5-S1    EXTRACORPOREAL SHOCK WAVE LITHOTRIPSY      EYE SURGERY      eyelid surgery    LITHOTRIPSY      REATTACHMENT OF MUSCLE Right 12/27/2019    Procedure: REATTACHMENT, MUSCLE;  Surgeon: Karoline Jaimes,  MD;  Location: [address] FLR;  Service: Ophthalmology;  Laterality: Right;    tonsillectomy      TRANSFORAMINAL EPIDURAL INJECTION OF STEROID Left 3/24/2020    Procedure: Injection,steroid,epidural,transforaminal approach;  Surgeon: Marshall Lane MD;  [facility];  Service: Pain Management;  Laterality: Left;  L4-5, L5-S1    TRANSFORAMINAL EPIDURAL INJECTION OF STEROID Left 7/28/2020    Procedure: INJECTION, STEROID, EPIDURAL, TRANSFORAMINAL APPROACH;  Surgeon: Marshall Lane MD;  Location: [facility];  Service: Pain Management;  Laterality: Left;  L4-5, L5-S1  leave last on the schedule.  will not need to retest for covid.  Verify no fever/off antiinflammatories and no covid symptoms.       Review of patient's allergies indicates:   Allergen Reactions    Sulfa (sulfonamide antibiotics) Itching and Other (See Comments)     Hyperventilation    Keflex [cephalexin] Other (See Comments)     Abd pain       Social History     Socioeconomic History    Marital status:     Number of children: 2   Occupational History    Occupation: [employer]     Employer: [employer]   Tobacco Use    Smoking status: Never    Smokeless tobacco: Never   Substance and Sexual Activity    Alcohol use: Yes     Comment: q month    Drug use: No    Sexual activity: Yes     Partners: Male     Birth control/protection: Post-menopausal     Social Determinants of Health     Financial Resource Strain: Low Risk  (7/31/2023)    Overall Financial Resource Strain (CARDIA)     Difficulty of Paying Living Expenses: Not hard at all   Food Insecurity: No Food Insecurity (7/31/2023)    Hunger Vital Sign     Worried About Running Out of Food in the Last Year: Never true     Ran Out of Food in the Last Year: Never true   Transportation Needs: No Transportation Needs (7/31/2023)    PRAPARE - Transportation     Lack of Transportation (Medical): No     Lack of Transportation (Non-Medical): No   Physical Activity: Sufficiently Active (7/31/2023)     Exercise Vital Sign     Days of Exercise per Week: 3 days     Minutes of Exercise per Session: 50 min   Stress: No Stress Concern Present (7/31/2023)    [employer] [place] [employer] - Occupational Stress Questionnaire     Feeling of Stress : Only a little   Social Connections: Unknown (7/31/2023)    Social Connection and Isolation Panel [NHANES]     Frequency of Communication with Friends and Family: Patient refused     Frequency of Social Gatherings with Friends and Family: Patient refused     Active Member of Clubs or Organizations: Yes     Attends Club or Organization Meetings: More than 4 times per year     Marital Status:    Housing Stability: Low Risk  (7/31/2023)    Housing Stability Vital Sign     Unable to Pay for Housing in the Last Year: No     Number of Places Lived in the Last Year: 2     Unstable Housing in the Last Year: No       Current Outpatient Medications on File Prior to Visit   Medication Sig Dispense Refill    ALPRAZolam (XANAX) 0.25 MG tablet Take 1 tablet (0.25 mg total) by mouth every evening. 30 tablet 5    cholecalciferol, vitamin D3, 5,000 unit Tab Take 5,000 Units by mouth every other day.      diphenhydrAMINE (BENADRYL) 25 mg capsule Take 25 mg by mouth nightly as needed for Allergies or Insomnia.      estradioL (ESTRACE) 0.01 % (0.1 mg/gram) vaginal cream Place 1 g vaginally twice a week.      liothyronine (CYTOMEL) 5 MCG Tab TAKE 1 TABLET BY MOUTH EVERY DAY 90 tablet 1    metoprolol succinate (TOPROL-XL) 25 MG 24 hr tablet TAKE 1 TABLET BY MOUTH EVERY DAY 90 tablet 3    SYNTHROID 125 mcg tablet TAKE 1 TABLET BY MOUTH EVERY DAY FOR 6 DAYS A WEEK AND 1/2 TABLET ON DAY 7 84 tablet 2    SYNTHROID 125 mcg tablet TAKE 1 TABLET BY MOUTH EVERY DAY FOR 6 DAYS A WEEK AND 1/2 TABLET ON DAY 7 84 tablet 2    [DISCONTINUED] cyclobenzaprine (FLEXERIL) 5 MG tablet Take 5 mg by mouth as needed for Muscle spasms.      [DISCONTINUED] dicyclomine (BENTYL) 20 mg tablet Take 1 tablet  (20 mg total) by mouth every 8 (eight) hours as needed. 30 tablet 3    clobetasol 0.05% (TEMOVATE) 0.05 % Oint Clobetasol propionate 0.05% once daily at night for 4 weeks, then alternate nights for 4 weeks, and then twice weekly for 4 weeks (Patient not taking: Reported on 9/21/2023) 45 g 1    promethazine (PHENERGAN) 25 MG tablet Take 1 tablet (25 mg total) by mouth every 4 (four) hours. (Patient not taking: Reported on 7/24/2023) 30 tablet 0    [DISCONTINUED] estradioL (VAGIFEM) 10 mcg Tab Place 1 tablet (10 mcg total) vaginally twice a week. (Patient not taking: Reported on 9/21/2023) 24 tablet 3     Current Facility-Administered Medications on File Prior to Visit   Medication Dose Route Frequency Provider Last Rate Last Admin    dexAMETHasone injection 12 mg  12 mg Intramuscular 1 time in Clinic/HOD Wilma Mulligan MD           Family History   Problem Relation Age of Onset    Hypertension Father     Stroke Father     Heart disease Mother         valve    Hypertension Mother     Heart disease Brother     Hypertension Brother     Diabetes Brother     Amblyopia Neg Hx     Blindness Neg Hx     Cancer Neg Hx     Cataracts Neg Hx     Glaucoma Neg Hx     Macular degeneration Neg Hx     Retinal detachment Neg Hx     Strabismus Neg Hx     Thyroid disease Neg Hx     Anesthesia problems Neg Hx     Clotting disorder Neg Hx     Breast cancer Neg Hx     Ovarian cancer Neg Hx        Review of Systems   Constitutional:  Negative for appetite change, chills, fever and unexpected weight change.   HENT:  Negative for sore throat and trouble swallowing.    Eyes:  Negative for pain and visual disturbance.   Respiratory:  Negative for cough, shortness of breath and wheezing.    Cardiovascular:  Negative for chest pain and palpitations.   Gastrointestinal:  Negative for abdominal pain, blood in stool, diarrhea, nausea and vomiting.   Genitourinary:  Negative for difficulty urinating, dysuria and hematuria.   Musculoskeletal:   "Negative for arthralgias, gait problem and neck pain.   Skin:  Negative for rash and wound.   Neurological:  Negative for dizziness, weakness, numbness and headaches.   Hematological:  Negative for adenopathy.   Psychiatric/Behavioral:  Negative for dysphoric mood. The patient is nervous/anxious.        Objective:      /66   Pulse 68   Temp 98.1 °F (36.7 °C) (Oral)   Resp 18   Ht 5' 4" (1.626 m)   Wt 60.3 kg (132 lb 15 oz)   SpO2 100%   BMI 22.82 kg/m²   Physical Exam  Constitutional:       Appearance: Normal appearance. She is well-developed.   HENT:      Head: Normocephalic.      Mouth/Throat:      Pharynx: No oropharyngeal exudate or posterior oropharyngeal erythema.   Eyes:      Conjunctiva/sclera: Conjunctivae normal.      Pupils: Pupils are equal, round, and reactive to light.   Neck:      Thyroid: No thyromegaly.   Cardiovascular:      Rate and Rhythm: Normal rate and regular rhythm.      Heart sounds: Normal heart sounds, S1 normal and S2 normal. No murmur heard.     No friction rub. No gallop.   Pulmonary:      Effort: Pulmonary effort is normal.      Breath sounds: Normal breath sounds. No wheezing or rales.   Abdominal:      General: Bowel sounds are normal. There is no distension.      Palpations: Abdomen is soft.      Tenderness: There is no abdominal tenderness.   Musculoskeletal:      Cervical back: Normal range of motion and neck supple.      Right lower leg: No edema.      Left lower leg: No edema.   Lymphadenopathy:      Cervical: No cervical adenopathy.   Skin:     General: Skin is warm.      Findings: No rash.   Neurological:      Mental Status: She is alert and oriented to person, place, and time.      Cranial Nerves: No cranial nerve deficit.      Gait: Gait normal.         Results for orders placed or performed in visit on 09/20/23   Comprehensive Metabolic Panel   Result Value Ref Range    Sodium 138 136 - 145 mmol/L    Potassium 4.0 3.5 - 5.1 mmol/L    Chloride 100 95 - 110 " mmol/L    CO2 24 23 - 29 mmol/L    Glucose 80 70 - 110 mg/dL    BUN 14 8 - 23 mg/dL    Creatinine 0.7 0.5 - 1.4 mg/dL    Calcium 10.0 8.7 - 10.5 mg/dL    Total Protein 7.3 6.0 - 8.4 g/dL    Albumin 4.1 3.5 - 5.2 g/dL    Total Bilirubin 0.4 0.1 - 1.0 mg/dL    Alkaline Phosphatase 73 55 - 135 U/L    AST 17 10 - 40 U/L    ALT 16 10 - 44 U/L    eGFR >60.0 >60 mL/min/1.73 m^2    Anion Gap 14 8 - 16 mmol/L   Lipid Panel   Result Value Ref Range    Cholesterol 219 (H) 120 - 199 mg/dL    Triglycerides 129 30 - 150 mg/dL    HDL 79 (H) 40 - 75 mg/dL    LDL Cholesterol 114.2 63.0 - 159.0 mg/dL    HDL/Cholesterol Ratio 36.1 20.0 - 50.0 %    Total Cholesterol/HDL Ratio 2.8 2.0 - 5.0    Non-HDL Cholesterol 140 mg/dL   CBC Auto Differential   Result Value Ref Range    WBC 8.37 3.90 - 12.70 K/uL    RBC 4.30 4.00 - 5.40 M/uL    Hemoglobin 13.6 12.0 - 16.0 g/dL    Hematocrit 41.3 37.0 - 48.5 %    MCV 96 82 - 98 fL    MCH 31.6 (H) 27.0 - 31.0 pg    MCHC 32.9 32.0 - 36.0 g/dL    RDW 13.0 11.5 - 14.5 %    Platelets 360 150 - 450 K/uL    MPV 11.1 9.2 - 12.9 fL    Immature Granulocytes 0.2 0.0 - 0.5 %    Gran # (ANC) 5.6 1.8 - 7.7 K/uL    Immature Grans (Abs) 0.02 0.00 - 0.04 K/uL    Lymph # 1.8 1.0 - 4.8 K/uL    Mono # 0.8 0.3 - 1.0 K/uL    Eos # 0.1 0.0 - 0.5 K/uL    Baso # 0.06 0.00 - 0.20 K/uL    nRBC 0 0 /100 WBC    Gran % 67.4 38.0 - 73.0 %    Lymph % 20.9 18.0 - 48.0 %    Mono % 9.6 4.0 - 15.0 %    Eosinophil % 1.2 0.0 - 8.0 %    Basophil % 0.7 0.0 - 1.9 %    Differential Method Automated    Lipid Panel   Result Value Ref Range    Cholesterol 219 (H) 120 - 199 mg/dL    Triglycerides 129 30 - 150 mg/dL    HDL 79 (H) 40 - 75 mg/dL    LDL Cholesterol 114.2 63.0 - 159.0 mg/dL    HDL/Cholesterol Ratio 36.1 20.0 - 50.0 %    Total Cholesterol/HDL Ratio 2.8 2.0 - 5.0    Non-HDL Cholesterol 140 mg/dL   TSH   Result Value Ref Range    TSH 0.543 0.400 - 4.000 uIU/mL   Hemoglobin A1C   Result Value Ref Range    Hemoglobin A1C 5.2 4.0 - 5.6 %     Estimated Avg Glucose 103 68 - 131 mg/dL       Assessment:       1. Anxiety    2. Primary insomnia    3. Breast cancer screening by mammogram          Plan:       Anxiety    Primary insomnia    Breast cancer screening by mammogram  -     Mammo Digital Screening Bilat; Future; Expected date: 09/21/2023    Other orders  -     dicyclomine (BENTYL) 20 mg tablet; Take 1 tablet (20 mg total) by mouth every 8 (eight) hours as needed.  Dispense: 30 tablet; Refill: 3  -     cyclobenzaprine (FLEXERIL) 5 MG tablet; Take 1 tablet (5 mg total) by mouth as needed for Muscle spasms.  Dispense: 30 tablet; Refill: 5          Continue xanax PRN  Continue current medications and specialty follow-up  Counseled on regular exercise, maintenance of a healthy weight, balanced diet rich in fruits/vegetables and lean protein, and avoidance of unhealthy habits like smoking and excessive alcohol intake.  F/u 6 months with virtual visit

## 2023-10-05 ENCOUNTER — PATIENT MESSAGE (OUTPATIENT)
Dept: RADIOLOGY | Facility: HOSPITAL | Age: 63
End: 2023-10-05
Payer: MEDICARE

## 2023-10-30 DIAGNOSIS — F51.01 PRIMARY INSOMNIA: ICD-10-CM

## 2023-10-30 DIAGNOSIS — F41.9 ANXIETY: ICD-10-CM

## 2023-10-30 NOTE — TELEPHONE ENCOUNTER
No care due was identified.  [facility] Embedded Care Due Messages. Reference number: [id number].   10/30/2023 3:36:38 PM CDT

## 2023-10-31 RX ORDER — ALPRAZOLAM 0.25 MG/1
0.25 TABLET ORAL NIGHTLY
Qty: 30 TABLET | Refills: 5 | Status: SHIPPED | OUTPATIENT
Start: 2023-10-31

## 2023-12-14 ENCOUNTER — PATIENT MESSAGE (OUTPATIENT)
Dept: OBSTETRICS AND GYNECOLOGY | Facility: CLINIC | Age: 63
End: 2023-12-14
Payer: MEDICARE

## 2023-12-14 DIAGNOSIS — N95.2 ATROPHIC VAGINITIS: Primary | ICD-10-CM

## 2023-12-15 RX ORDER — ESTRADIOL 0.1 MG/G
1 CREAM VAGINAL
Qty: 42.5 G | Refills: 0 | Status: SHIPPED | OUTPATIENT
Start: 2023-12-18 | End: 2024-03-18

## 2024-01-16 ENCOUNTER — PATIENT MESSAGE (OUTPATIENT)
Dept: ENDOCRINOLOGY | Facility: CLINIC | Age: 64
End: 2024-01-16
Payer: MEDICARE

## 2024-01-16 ENCOUNTER — PATIENT MESSAGE (OUTPATIENT)
Dept: FAMILY MEDICINE | Facility: CLINIC | Age: 64
End: 2024-01-16
Payer: MEDICARE

## 2024-01-16 DIAGNOSIS — R00.2 PALPITATIONS: ICD-10-CM

## 2024-01-16 RX ORDER — LEVOTHYROXINE SODIUM 125 UG/1
125 TABLET ORAL
Qty: 90 TABLET | Refills: 3 | Status: SHIPPED | OUTPATIENT
Start: 2024-01-16

## 2024-01-16 RX ORDER — LIOTHYRONINE SODIUM 5 UG/1
5 TABLET ORAL DAILY
Qty: 90 TABLET | Refills: 3 | Status: SHIPPED | OUTPATIENT
Start: 2024-01-16

## 2024-01-18 RX ORDER — METOPROLOL SUCCINATE 25 MG/1
25 TABLET, EXTENDED RELEASE ORAL DAILY
Qty: 90 TABLET | Refills: 3 | Status: SHIPPED | OUTPATIENT
Start: 2024-01-18 | End: 2024-01-21 | Stop reason: SDUPTHER

## 2024-01-18 NOTE — TELEPHONE ENCOUNTER
No care due was identified.  [facility] Embedded Care Due Messages. Reference number: [id number].   1/18/2024 12:57:51 PM CST

## 2024-01-21 RX ORDER — METOPROLOL SUCCINATE 25 MG/1
25 TABLET, EXTENDED RELEASE ORAL DAILY
Qty: 90 TABLET | Refills: 2 | Status: SHIPPED | OUTPATIENT
Start: 2024-01-21 | End: 2024-03-11

## 2024-02-27 ENCOUNTER — TELEPHONE (OUTPATIENT)
Dept: FAMILY MEDICINE | Facility: CLINIC | Age: 64
End: 2024-02-27
Payer: MEDICARE

## 2024-02-27 DIAGNOSIS — Z00.00 ENCOUNTER FOR MEDICARE ANNUAL WELLNESS EXAM: ICD-10-CM

## 2024-02-27 NOTE — TELEPHONE ENCOUNTER
I left message on recorder that Dr. Szymanski is going to be out on 3/21/24.  I have changed her appointment to 3/22/24 at 3:20 pm.  Please call if she can not keep this appointment.

## 2024-02-28 ENCOUNTER — TELEPHONE (OUTPATIENT)
Dept: DERMATOLOGY | Facility: CLINIC | Age: 64
End: 2024-02-28
Payer: MEDICARE

## 2024-02-28 NOTE — TELEPHONE ENCOUNTER
Returned call to patient, offered patient an appointment. Patient declined.     ----- Message from Zeny Reynolds sent at 2/28/2024  8:59 AM CST -----  Contact: Self  Type:  Sooner Appointment Request    Caller is requesting a sooner appointment.  Caller declined first available appointment listed below.  Caller will not accept being placed on the waitlist and is requesting a message be sent to doctor.    Name of Caller:  Patient  When is the first available appointment?  12/28  Symptoms:  red facial blotches that are swelling and causing painful pimples  Would the patient rather a call back or a response via [facility]? Call  Best Call Back Number:  [phone]  Additional Information:  Please call pt back to assist. Thank You.

## 2024-03-11 DIAGNOSIS — R00.2 PALPITATIONS: ICD-10-CM

## 2024-03-11 RX ORDER — METOPROLOL SUCCINATE 25 MG/1
25 TABLET, EXTENDED RELEASE ORAL
Qty: 90 TABLET | Refills: 1 | Status: SHIPPED | OUTPATIENT
Start: 2024-03-11 | End: 2024-05-02 | Stop reason: SDUPTHER

## 2024-03-11 NOTE — TELEPHONE ENCOUNTER
No care due was identified.  [facility] Embedded Care Due Messages. Reference number: [id number].   3/11/2024 12:49:11 PM CDT

## 2024-03-12 NOTE — TELEPHONE ENCOUNTER
Refill Authorization Note     Refill Decision Note   Lexi Amezquita  is requesting a refill authorization.  Brief Assessment and Rationale for Refill:  Approve     Medication Therapy Plan:       Medication Reconciliation Completed: No   Comments:     No Care Gaps recommended.     Note composed:7:55 PM 03/11/2024

## 2024-03-15 DIAGNOSIS — N95.2 ATROPHIC VAGINITIS: ICD-10-CM

## 2024-03-18 RX ORDER — ESTRADIOL 0.1 MG/G
1 CREAM VAGINAL
Qty: 42.5 G | Refills: 0 | Status: SHIPPED | OUTPATIENT
Start: 2024-03-18 | End: 2024-05-16

## 2024-03-20 ENCOUNTER — PATIENT MESSAGE (OUTPATIENT)
Dept: ENDOCRINOLOGY | Facility: CLINIC | Age: 64
End: 2024-03-20
Payer: MEDICARE

## 2024-03-25 ENCOUNTER — TELEPHONE (OUTPATIENT)
Dept: FAMILY MEDICINE | Facility: CLINIC | Age: 64
End: 2024-03-25
Payer: MEDICARE

## 2024-03-25 NOTE — TELEPHONE ENCOUNTER
----- Message from Susan Cha sent at 3/25/2024  2:50 PM CDT -----  Regarding: virtual appt  Contact: pt  Type:  Needs Medical Advice    Who Called: pt  Would the patient rather a call back or a response via [facility]? Call back  Best Call Back Number: [phone]    Additional Information: sts that she would like to schedule a virtual appt om any day but Tuesday or Thursday

## 2024-05-02 ENCOUNTER — OFFICE VISIT (OUTPATIENT)
Dept: FAMILY MEDICINE | Facility: CLINIC | Age: 64
End: 2024-05-02
Payer: MEDICARE

## 2024-05-02 DIAGNOSIS — E55.9 VITAMIN D DEFICIENCY: ICD-10-CM

## 2024-05-02 DIAGNOSIS — R00.2 PALPITATIONS: ICD-10-CM

## 2024-05-02 DIAGNOSIS — E78.5 HYPERLIPIDEMIA, UNSPECIFIED HYPERLIPIDEMIA TYPE: ICD-10-CM

## 2024-05-02 DIAGNOSIS — F41.9 ANXIETY: Primary | ICD-10-CM

## 2024-05-02 DIAGNOSIS — F51.01 PRIMARY INSOMNIA: ICD-10-CM

## 2024-05-02 DIAGNOSIS — E03.9 HYPOTHYROIDISM, UNSPECIFIED TYPE: ICD-10-CM

## 2024-05-02 PROCEDURE — 99213 OFFICE O/P EST LOW 20 MIN: CPT | Mod: 95,,, | Performed by: FAMILY MEDICINE

## 2024-05-02 RX ORDER — DICYCLOMINE HYDROCHLORIDE 20 MG/1
20 TABLET ORAL EVERY 8 HOURS PRN
Qty: 30 TABLET | Refills: 5 | Status: SHIPPED | OUTPATIENT
Start: 2024-05-02

## 2024-05-02 RX ORDER — CYCLOBENZAPRINE HCL 5 MG
5 TABLET ORAL
Qty: 30 TABLET | Refills: 5 | Status: SHIPPED | OUTPATIENT
Start: 2024-05-02

## 2024-05-02 RX ORDER — METOPROLOL SUCCINATE 25 MG/1
25 TABLET, EXTENDED RELEASE ORAL DAILY
Qty: 90 TABLET | Refills: 3 | Status: SHIPPED | OUTPATIENT
Start: 2024-05-02

## 2024-05-02 RX ORDER — ALPRAZOLAM 0.25 MG/1
0.25 TABLET ORAL NIGHTLY
Qty: 30 TABLET | Refills: 5 | Status: SHIPPED | OUTPATIENT
Start: 2024-05-02

## 2024-05-02 NOTE — PROGRESS NOTES
Subjective:       Patient ID: Lexi Amezquita is a 63 y.o. female.    Chief Complaint: No chief complaint on file.    The patient location is: [place]  The chief complaint leading to consultation is: anxiety    Visit type: audiovisual    Face to Face time with patient: 15  minutes of total time spent on the encounter, which includes face to face time and non-face to face time preparing to see the patient (eg, review of tests), Obtaining and/or reviewing separately obtained history, Documenting clinical information in the electronic or other health record, Independently interpreting results (not separately reported) and communicating results to the patient/family/caregiver, or Care coordination (not separately reported).     Each patient to whom he or she provides medical services by telemedicine is:  (1) informed of the relationship between the physician and patient and the respective role of any other health care provider with respect to management of the patient; and (2) notified that he or she may decline to receive medical services by telemedicine and may withdraw from such care at any time.    Notes:        Here for f/u on chronic health issues    Lost 40 pounds in the last year with diet and exercise.    She is overall feeling well.    Graves disease, hypothyroidsm - s/p JOHNS; taking Cytomel and synthroid; following with endocrinology  Palpitations, PVCs  - taking Toprol XL 25mg daily  Anxiety - some goes day and bad days; some anxiety at bedtime; using benadryl at night; using Xanax 0.5mg 1/2 tab PRN insomnia or panic  Uses xanax primarily after getting steroid injections for control of her shoulder and low back pains.  Also needs refill of flexeril     and remarried.  Also has a new job.      Medication Refill  Pertinent negatives include no abdominal pain, arthralgias, chest pain, chills, coughing, fever, headaches, joint swelling, nausea, neck pain, numbness, rash, sore throat, vomiting or  weakness.       Past Medical History:   Diagnosis Date    Abnormal Pap smear     Abnormal Pap smear of cervix     Anxiety     Arthritis     Cataract     Grave's disease     Grave's disease     IBS (irritable bowel syndrome)     rare    Internal hemorrhoids     followed by Dr. Schilling    Nephrolithiasis     followed by Dr. Pope    Thyroid disease     Vitamin D deficiency        Past Surgical History:   Procedure Laterality Date    CATARACT EXTRACTION  11/19/12    right eye (toric)    CATARACT EXTRACTION W/  INTRAOCULAR LENS IMPLANT  11/26/12    left eye (toric)    EPIDURAL STEROID INJECTION INTO LUMBAR SPINE N/A 10/21/2021    Procedure: INJECTION, STEROID, SPINE, LUMBAR, EPIDURAL;  Surgeon: Marshall Lane MD;  Location: [facility];  Service: Pain Management;  Laterality: N/A;  L5-S1    EXTRACORPOREAL SHOCK WAVE LITHOTRIPSY      EYE SURGERY      eyelid surgery    LITHOTRIPSY      REATTACHMENT OF MUSCLE Right 12/27/2019    Procedure: REATTACHMENT, MUSCLE;  Surgeon: Karoline Jaimes MD;  Location: [address];  Service: Ophthalmology;  Laterality: Right;    tonsillectomy      TRANSFORAMINAL EPIDURAL INJECTION OF STEROID Left 3/24/2020    Procedure: Injection,steroid,epidural,transforaminal approach;  Surgeon: Marshall Lane MD;  Location: [facility];  Service: Pain Management;  Laterality: Left;  L4-5, L5-S1    TRANSFORAMINAL EPIDURAL INJECTION OF STEROID Left 7/28/2020    Procedure: INJECTION, STEROID, EPIDURAL, TRANSFORAMINAL APPROACH;  Surgeon: Marshall Lane MD;  Location: [facility];  Service: Pain Management;  Laterality: Left;  L4-5, L5-S1  leave last on the schedule.  will not need to retest for covid.  Verify no fever/off antiinflammatories and no covid symptoms.       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 [employer]     Employer: [employer]   Tobacco Use    Smoking status: Never    Smokeless tobacco: Never   Substance and Sexual Activity    Alcohol use: Yes     Comment: q month    Drug use: No    Sexual activity: Yes     Partners: Male     Birth control/protection: Post-menopausal     Social Determinants of Health     Financial Resource Strain: Low Risk  (5/2/2024)    Overall Financial Resource Strain (CARDIA)     Difficulty of Paying Living Expenses: Not hard at all   Food Insecurity: No Food Insecurity (5/2/2024)    Hunger Vital Sign     Worried About Running Out of Food in the Last Year: Never true     Ran Out of Food in the Last Year: Never true   Transportation Needs: No Transportation Needs (5/2/2024)    PRAPARE - Transportation     Lack of Transportation (Medical): No     Lack of Transportation (Non-Medical): No   Physical Activity: Insufficiently Active (5/2/2024)    Exercise Vital Sign     Days of Exercise per Week: 2 days     Minutes of Exercise per Session: 40 min   Stress: No Stress Concern Present (7/31/2023)    [employer] - Occupational Stress Questionnaire     Feeling of Stress : Only a little   Housing Stability: Unknown (5/2/2024)    Housing Stability Vital Sign     Unable to Pay for Housing in the Last Year: No       Current Outpatient Medications on File Prior to Visit   Medication Sig Dispense Refill    cholecalciferol, vitamin D3, 5,000 unit Tab Take 5,000 Units by mouth every other day.      diphenhydrAMINE (BENADRYL) 25 mg capsule Take 25 mg by mouth nightly as needed for Allergies or Insomnia.      estradioL (ESTRACE) 0.01 % (0.1 mg/gram) vaginal cream PLACE 1 GRAM VAGINALLY TWICE A WEEK 42.5 g 0    liothyronine (CYTOMEL) 5 MCG Tab Take 1 tablet (5 mcg total) by mouth once daily. 90 tablet 3    promethazine (PHENERGAN) 25 MG tablet Take 1 tablet (25 mg total) by  mouth every 4 (four) hours. (Patient not taking: Reported on 7/24/2023) 30 tablet 0    SYNTHROID 125 mcg tablet TAKE 1 TABLET BY MOUTH EVERY DAY FOR 6 DAYS A WEEK AND 1/2 TABLET ON DAY 7 84 tablet 2    SYNTHROID 125 mcg tablet Take 1 tablet (125 mcg total) by mouth before breakfast. 90 tablet 3     Current Facility-Administered Medications on File Prior to Visit   Medication Dose Route Frequency Provider Last Rate Last Admin    dexAMETHasone injection 12 mg  12 mg Intramuscular 1 time in Clinic/HOD Wilma Mulligan MD           Family History   Problem Relation Name Age of Onset    Hypertension Father      Stroke Father      Heart disease Mother          valve    Hypertension Mother      Heart disease Brother      Hypertension Brother      Diabetes Brother      Amblyopia Neg Hx      Blindness Neg Hx      Cancer Neg Hx      Cataracts Neg Hx      Glaucoma Neg Hx      Macular degeneration Neg Hx      Retinal detachment Neg Hx      Strabismus Neg Hx      Thyroid disease Neg Hx      Anesthesia problems Neg Hx      Clotting disorder Neg Hx      Breast cancer Neg Hx      Ovarian cancer Neg Hx         Review of Systems   Constitutional:  Negative for activity change, appetite change, chills, fever and unexpected weight change.   HENT:  Negative for hearing loss, rhinorrhea, sore throat and trouble swallowing.    Eyes:  Negative for pain, discharge and visual disturbance.   Respiratory:  Negative for cough, chest tightness, shortness of breath and wheezing.    Cardiovascular:  Negative for chest pain and palpitations.   Gastrointestinal:  Negative for abdominal pain, blood in stool, constipation, diarrhea, nausea and vomiting.   Endocrine: Negative for polydipsia and polyuria.   Genitourinary:  Negative for difficulty urinating, dysuria, hematuria and menstrual problem.   Musculoskeletal:  Negative for arthralgias, gait problem, joint swelling and neck pain.   Skin:  Negative for rash and wound.   Neurological:  Negative  for dizziness, weakness, numbness and headaches.   Hematological:  Negative for adenopathy.   Psychiatric/Behavioral:  Negative for confusion and dysphoric mood. The patient is nervous/anxious.        Objective:      There were no vitals taken for this visit.  Physical Exam  Constitutional:       Appearance: She is well-developed.   HENT:      Head: Normocephalic and atraumatic.   Eyes:      Pupils: Pupils are equal, round, and reactive to light.   Pulmonary:      Effort: Pulmonary effort is normal.   Musculoskeletal:      Cervical back: Neck supple.   Neurological:      Mental Status: She is alert and oriented to person, place, and time.   Psychiatric:         Behavior: Behavior normal.         Thought Content: Thought content normal.         Judgment: Judgment normal.         Results for orders placed or performed in visit on 09/20/23   Comprehensive Metabolic Panel   Result Value Ref Range    Sodium 138 136 - 145 mmol/L    Potassium 4.0 3.5 - 5.1 mmol/L    Chloride 100 95 - 110 mmol/L    CO2 24 23 - 29 mmol/L    Glucose 80 70 - 110 mg/dL    BUN 14 8 - 23 mg/dL    Creatinine 0.7 0.5 - 1.4 mg/dL    Calcium 10.0 8.7 - 10.5 mg/dL    Total Protein 7.3 6.0 - 8.4 g/dL    Albumin 4.1 3.5 - 5.2 g/dL    Total Bilirubin 0.4 0.1 - 1.0 mg/dL    Alkaline Phosphatase 73 55 - 135 U/L    AST 17 10 - 40 U/L    ALT 16 10 - 44 U/L    eGFR >60.0 >60 mL/min/1.73 m^2    Anion Gap 14 8 - 16 mmol/L   Lipid Panel   Result Value Ref Range    Cholesterol 219 (H) 120 - 199 mg/dL    Triglycerides 129 30 - 150 mg/dL    HDL 79 (H) 40 - 75 mg/dL    LDL Cholesterol 114.2 63.0 - 159.0 mg/dL    HDL/Cholesterol Ratio 36.1 20.0 - 50.0 %    Total Cholesterol/HDL Ratio 2.8 2.0 - 5.0    Non-HDL Cholesterol 140 mg/dL   CBC Auto Differential   Result Value Ref Range    WBC 8.37 3.90 - 12.70 K/uL    RBC 4.30 4.00 - 5.40 M/uL    Hemoglobin 13.6 12.0 - 16.0 g/dL    Hematocrit 41.3 37.0 - 48.5 %    MCV 96 82 - 98 fL    MCH 31.6 (H) 27.0 - 31.0 pg    MCHC 32.9  32.0 - 36.0 g/dL    RDW 13.0 11.5 - 14.5 %    Platelets 360 150 - 450 K/uL    MPV 11.1 9.2 - 12.9 fL    Immature Granulocytes 0.2 0.0 - 0.5 %    Gran # (ANC) 5.6 1.8 - 7.7 K/uL    Immature Grans (Abs) 0.02 0.00 - 0.04 K/uL    Lymph # 1.8 1.0 - 4.8 K/uL    Mono # 0.8 0.3 - 1.0 K/uL    Eos # 0.1 0.0 - 0.5 K/uL    Baso # 0.06 0.00 - 0.20 K/uL    nRBC 0 0 /100 WBC    Gran % 67.4 38.0 - 73.0 %    Lymph % 20.9 18.0 - 48.0 %    Mono % 9.6 4.0 - 15.0 %    Eosinophil % 1.2 0.0 - 8.0 %    Basophil % 0.7 0.0 - 1.9 %    Differential Method Automated    Lipid Panel   Result Value Ref Range    Cholesterol 219 (H) 120 - 199 mg/dL    Triglycerides 129 30 - 150 mg/dL    HDL 79 (H) 40 - 75 mg/dL    LDL Cholesterol 114.2 63.0 - 159.0 mg/dL    HDL/Cholesterol Ratio 36.1 20.0 - 50.0 %    Total Cholesterol/HDL Ratio 2.8 2.0 - 5.0    Non-HDL Cholesterol 140 mg/dL   TSH   Result Value Ref Range    TSH 0.543 0.400 - 4.000 uIU/mL   Hemoglobin A1C   Result Value Ref Range    Hemoglobin A1C 5.2 4.0 - 5.6 %    Estimated Avg Glucose 103 68 - 131 mg/dL       Assessment:       1. Anxiety    2. Primary insomnia    3. Palpitations    4. Vitamin D deficiency    5. Hypothyroidism, unspecified type    6. Hyperlipidemia, unspecified hyperlipidemia type            Plan:       Anxiety  -     ALPRAZolam (XANAX) 0.25 MG tablet; Take 1 tablet (0.25 mg total) by mouth every evening.  Dispense: 30 tablet; Refill: 5    Primary insomnia  -     ALPRAZolam (XANAX) 0.25 MG tablet; Take 1 tablet (0.25 mg total) by mouth every evening.  Dispense: 30 tablet; Refill: 5    Palpitations  -     metoprolol succinate (TOPROL-XL) 25 MG 24 hr tablet; Take 1 tablet (25 mg total) by mouth once daily.  Dispense: 90 tablet; Refill: 3    Vitamin D deficiency  -     Vitamin D; Future; Expected date: 05/02/2024    Hypothyroidism, unspecified type  -     CBC Auto Differential; Future; Expected date: 10/29/2024    Hyperlipidemia, unspecified hyperlipidemia type  -     Comprehensive  Metabolic Panel; Future; Expected date: 10/29/2024  -     Lipid Panel; Future; Expected date: 10/29/2024    Other orders  -     dicyclomine (BENTYL) 20 mg tablet; Take 1 tablet (20 mg total) by mouth every 8 (eight) hours as needed.  Dispense: 30 tablet; Refill: 5  -     cyclobenzaprine (FLEXERIL) 5 MG tablet; Take 1 tablet (5 mg total) by mouth as needed for Muscle spasms.  Dispense: 30 tablet; Refill: 5            Continue xanax PRN  Continue current medications and specialty follow-up  Counseled on regular exercise, maintenance of a healthy weight, balanced diet rich in fruits/vegetables and lean protein, and avoidance of unhealthy habits like smoking and excessive alcohol intake.  F/u 6 months with labs for physical

## 2024-05-09 DIAGNOSIS — N95.2 ATROPHIC VAGINITIS: ICD-10-CM

## 2024-05-16 RX ORDER — ESTRADIOL 0.1 MG/G
1 CREAM VAGINAL
Qty: 42.5 G | Refills: 0 | Status: SHIPPED | OUTPATIENT
Start: 2024-05-16

## 2024-06-10 ENCOUNTER — PATIENT OUTREACH (OUTPATIENT)
Dept: ADMINISTRATIVE | Facility: HOSPITAL | Age: 64
End: 2024-06-10
Payer: MEDICARE

## 2024-06-10 NOTE — PROGRESS NOTES
Population Health Chart Review & Patient Outreach Details      Additional [facility] Health Notes:               Updates Requested / Reviewed:      Updated Care Coordination Note, Care Everywhere, Care Team Updated, and Immunizations Reconciliation Completed or Queried: [facility] Topics Overdue:      [facility] Score: 2     Osteoporosis Screening  Mammogram    Tetanus Vaccine  RSV Vaccine                  Health Maintenance Topic(s) Outreach Outcomes & Actions Taken:    Eye Exam - Outreach Outcomes & Actions Taken  : Non-Diabetic Eye External Records Uploaded, Care Team & History Updated if Applicable

## 2024-07-24 ENCOUNTER — LAB VISIT (OUTPATIENT)
Dept: LAB | Facility: HOSPITAL | Age: 64
End: 2024-07-24
Attending: INTERNAL MEDICINE
Payer: MEDICARE

## 2024-07-24 DIAGNOSIS — E03.9 HYPOTHYROIDISM, UNSPECIFIED TYPE: ICD-10-CM

## 2024-07-24 LAB
T3 SERPL-MCNC: 111 NG/DL (ref 60–180)
T4 FREE SERPL-MCNC: 1.16 NG/DL (ref 0.71–1.51)
TSH SERPL DL<=0.005 MIU/L-ACNC: 0.11 UIU/ML (ref 0.4–4)

## 2024-07-24 PROCEDURE — 84480 ASSAY TRIIODOTHYRONINE (T3): CPT | Performed by: INTERNAL MEDICINE

## 2024-07-24 PROCEDURE — 36415 COLL VENOUS BLD VENIPUNCTURE: CPT | Mod: PO | Performed by: INTERNAL MEDICINE

## 2024-07-24 PROCEDURE — 84439 ASSAY OF FREE THYROXINE: CPT | Performed by: INTERNAL MEDICINE

## 2024-07-24 PROCEDURE — 84443 ASSAY THYROID STIM HORMONE: CPT | Performed by: INTERNAL MEDICINE

## 2024-07-26 ENCOUNTER — OFFICE VISIT (OUTPATIENT)
Dept: ENDOCRINOLOGY | Facility: CLINIC | Age: 64
End: 2024-07-26
Payer: MEDICARE

## 2024-07-26 DIAGNOSIS — I10 HYPERTENSION, UNSPECIFIED TYPE: ICD-10-CM

## 2024-07-26 DIAGNOSIS — E03.9 HYPOTHYROIDISM, UNSPECIFIED TYPE: Primary | ICD-10-CM

## 2024-07-26 RX ORDER — LEVOTHYROXINE SODIUM 112 UG/1
112 TABLET ORAL
Qty: 30 TABLET | Refills: 11 | Status: SHIPPED | OUTPATIENT
Start: 2024-07-26 | End: 2025-07-26

## 2024-07-26 NOTE — PROGRESS NOTES
The patient location is: [place]    Visit type: audiovisual    Face to Face time with patient: 6  8 minutes of total time spent on the encounter, which includes face to face time and non-face to face time preparing to see the patient (eg, review of tests), Obtaining and/or reviewing separately obtained history, Documenting clinical information in the electronic or other health record, Independently interpreting results (not separately reported) and communicating results to the patient/family/caregiver, or Care coordination (not separately reported).         Each patient to whom he or she provides medical services by telemedicine is:  (1) informed of the relationship between the physician and patient and the respective role of any other health care provider with respect to management of the patient; and (2) notified that he or she may decline to receive medical services by telemedicine and may withdraw from such care at any time.    Notes:       Notes:   CHIEF COMPLAINT: Hypothyroidism/status post Graves  63 y.o.  female here for followup. Currently on Synthroid 125 mcg 6 days a week and 1/2 tablet on day 7 and Cytomel 5 daily. Has tried generic in the past and had difficulty tolerating. States insurance requiring generic.   No palpitations. No tremors. On beta blocker. Has had some increase anxiety. NO diarrhea. No heat intolerance. No vision changes.         PAST MEDICAL HISTORY: Graves treated with radioactive iodine in 2000. Also had Graves eye disease    PAST SURGICAL HISTORY: 3 surgeries for Graves eye disease, tonsillectomy, lithotripsy    SOCIAL HISTORY: Does not smoke or drink    FAMILY HISTORY: Hypothyroidism, heart disease, diabetes. Incidentally  had Graves' disease as well    MEDICATIONS/ALLERGIES: The patient's MedCard has been updated and reviewed.         PE: Virtual Visit     Latest Reference Range & Units 07/24/24 12:08   TSH 0.400 - 4.000 uIU/mL 0.113 (L)   T3, Total 60 - 180 ng/dL 111   Free T4  0.71 - 1.51 ng/dL 1.16   (L): Data is abnormally low        ASSESSMENT/PLAN:  1. Hypothyroidism- status post treatment with I-131 for Graves' disease.  Currently on generic Synthroid.  Decrease Synthroid to 112 mcg once daily.  Can stay on Cytomel.  Check TFTs in 6 weeks.    2. Graves' eye disease- seeing ophthalmology. No vision changes    3.  Hypertension-currently on a beta-blocker which can suppressed palpitations from excessive thyroid hormone.    FOLLOWUP: [facility]  6 weeks- TSH, Ft4, T3  F/U 1 year TSH, Ft4, T3

## 2024-08-25 ENCOUNTER — PATIENT MESSAGE (OUTPATIENT)
Dept: OBSTETRICS AND GYNECOLOGY | Facility: CLINIC | Age: 64
End: 2024-08-25
Payer: MEDICARE

## 2024-09-04 ENCOUNTER — LAB VISIT (OUTPATIENT)
Dept: LAB | Facility: HOSPITAL | Age: 64
End: 2024-09-04
Attending: INTERNAL MEDICINE
Payer: MEDICARE

## 2024-09-04 DIAGNOSIS — E03.9 HYPOTHYROIDISM, UNSPECIFIED TYPE: ICD-10-CM

## 2024-09-04 LAB
T3 SERPL-MCNC: 70 NG/DL (ref 60–180)
T4 FREE SERPL-MCNC: 0.96 NG/DL (ref 0.71–1.51)
TSH SERPL DL<=0.005 MIU/L-ACNC: 0.64 UIU/ML (ref 0.4–4)

## 2024-09-04 PROCEDURE — 84480 ASSAY TRIIODOTHYRONINE (T3): CPT | Performed by: INTERNAL MEDICINE

## 2024-09-04 PROCEDURE — 36415 COLL VENOUS BLD VENIPUNCTURE: CPT | Mod: PO | Performed by: INTERNAL MEDICINE

## 2024-09-04 PROCEDURE — 84443 ASSAY THYROID STIM HORMONE: CPT | Performed by: INTERNAL MEDICINE

## 2024-09-04 PROCEDURE — 84439 ASSAY OF FREE THYROXINE: CPT | Performed by: INTERNAL MEDICINE

## 2024-09-09 ENCOUNTER — HOSPITAL ENCOUNTER (OUTPATIENT)
Dept: RADIOLOGY | Facility: CLINIC | Age: 64
Discharge: HOME | End: 2024-09-09
Attending: FAMILY MEDICINE
Payer: MEDICARE

## 2024-09-09 DIAGNOSIS — Z12.31 BREAST CANCER SCREENING BY MAMMOGRAM: ICD-10-CM

## 2024-09-09 PROCEDURE — 77067 SCR MAMMO BI INCL CAD: CPT | Mod: TC,PO

## 2024-09-09 PROCEDURE — 77067 SCR MAMMO BI INCL CAD: CPT | Mod: 26,,, | Performed by: RADIOLOGY

## 2024-09-09 PROCEDURE — 77063 BREAST TOMOSYNTHESIS BI: CPT | Mod: 26,,, | Performed by: RADIOLOGY

## 2024-09-16 ENCOUNTER — TELEPHONE (OUTPATIENT)
Dept: OBSTETRICS AND GYNECOLOGY | Facility: CLINIC | Age: 64
End: 2024-09-16
Payer: MEDICARE

## 2024-09-16 DIAGNOSIS — N95.2 ATROPHIC VAGINITIS: Primary | ICD-10-CM

## 2024-09-16 NOTE — TELEPHONE ENCOUNTER
----- Message from Tano Reaves sent at 9/16/2024  2:59 PM CDT -----  Contact: Self  Type:  RX Refill Request    Who Called:  Patient  Refill or New Rx:  Refill  RX Name and Strength:    estradioL (ESTRACE) 0.01 % (0.1 mg/gram) vaginal cream    How is the patient currently taking it? (ex. 1XDay):  as directed  Is this a 30 day or 90 day RX:  60  Preferred Pharmacy with phone number:    [facility] - [place], [place] - [address]  [address] [address]  Phone: [phone] Fax: [phone]      Local or Mail Order:  Local  Ordering Provider:  Neil Rocha Call Back Number:  [phone]   Additional Information:  NOV 11/11, wanted enough to last to next davin.

## 2024-09-20 RX ORDER — ESTRADIOL 0.1 MG/G
1 CREAM VAGINAL
Qty: 42.5 G | Refills: 0 | Status: SHIPPED | OUTPATIENT
Start: 2024-09-23

## 2024-10-05 DIAGNOSIS — R00.2 PALPITATIONS: ICD-10-CM

## 2024-10-05 NOTE — TELEPHONE ENCOUNTER
No care due was identified.  [facility] Embedded Care Due Messages. Reference number: [id number].   10/05/2024 8:01:05 AM CDT

## 2024-10-06 NOTE — TELEPHONE ENCOUNTER
Refill Routing Note   Medication(s) are not appropriate for processing by [facility] for the following reason(s):      Required vitals outdated    [facility] action(s):  Defer Care Due:  None identified            Appointments  past 12m or future 3m with PCP    Date Provider   Last Visit   5/2/2024 Danny Szymanski MD   Next Visit   11/4/2024 Danny Szymanski MD   ED visits in past 90 days: 0        Note composed:8:11 AM 10/06/2024

## 2024-10-07 RX ORDER — METOPROLOL SUCCINATE 25 MG/1
25 TABLET, EXTENDED RELEASE ORAL DAILY
Qty: 90 TABLET | Refills: 3 | Status: SHIPPED | OUTPATIENT
Start: 2024-10-07

## 2024-10-24 ENCOUNTER — PATIENT MESSAGE (OUTPATIENT)
Dept: FAMILY MEDICINE | Facility: CLINIC | Age: 64
End: 2024-10-24
Payer: MEDICARE

## 2024-10-29 ENCOUNTER — PATIENT MESSAGE (OUTPATIENT)
Dept: ENDOCRINOLOGY | Facility: CLINIC | Age: 64
End: 2024-10-29
Payer: MEDICARE

## 2024-10-29 RX ORDER — LEVOTHYROXINE SODIUM 112 UG/1
112 TABLET ORAL
Qty: 90 TABLET | Refills: 3 | Status: SHIPPED | OUTPATIENT
Start: 2024-10-29 | End: 2025-10-29

## 2024-11-05 DIAGNOSIS — M25.569 KNEE PAIN, UNSPECIFIED CHRONICITY, UNSPECIFIED LATERALITY: Primary | ICD-10-CM

## 2024-11-11 ENCOUNTER — OFFICE VISIT (OUTPATIENT)
Dept: ORTHOPEDICS | Facility: CLINIC | Age: 64
End: 2024-11-11
Payer: MEDICARE

## 2024-11-11 ENCOUNTER — HOSPITAL ENCOUNTER (OUTPATIENT)
Dept: RADIOLOGY | Facility: HOSPITAL | Age: 64
Discharge: HOME | End: 2024-11-11
Attending: ORTHOPAEDIC SURGERY
Payer: MEDICARE

## 2024-11-11 VITALS — BODY MASS INDEX: 22.7 KG/M2 | HEIGHT: 64 IN | WEIGHT: 132.94 LBS

## 2024-11-11 DIAGNOSIS — M25.569 KNEE PAIN, UNSPECIFIED CHRONICITY, UNSPECIFIED LATERALITY: Primary | ICD-10-CM

## 2024-11-11 DIAGNOSIS — M17.10 ARTHRITIS OF KNEE: ICD-10-CM

## 2024-11-11 DIAGNOSIS — M25.569 KNEE PAIN, UNSPECIFIED CHRONICITY, UNSPECIFIED LATERALITY: ICD-10-CM

## 2024-11-11 PROCEDURE — 73564 X-RAY EXAM KNEE 4 OR MORE: CPT | Mod: 26,50,, | Performed by: RADIOLOGY

## 2024-11-11 PROCEDURE — 99999 PR PBB SHADOW E&M-EST. PATIENT-LVL II: CPT | Mod: PBBFAC,,, | Performed by: ORTHOPAEDIC SURGERY

## 2024-11-11 PROCEDURE — 99212 OFFICE O/P EST SF 10 MIN: CPT | Mod: PBBFAC,25,PO | Performed by: ORTHOPAEDIC SURGERY

## 2024-11-11 PROCEDURE — 73564 X-RAY EXAM KNEE 4 OR MORE: CPT | Mod: TC,50,PO

## 2024-11-11 RX ORDER — NAPROXEN 500 MG/1
500 TABLET ORAL 2 TIMES DAILY WITH MEALS
Qty: 60 TABLET | Refills: 2 | Status: SHIPPED | OUTPATIENT
Start: 2024-11-11

## 2024-11-11 NOTE — PROGRESS NOTES
Past Medical History:   Diagnosis Date    Abnormal Pap smear     Abnormal Pap smear of cervix     Anxiety     Arthritis     Cataract     Grave's disease     Grave's disease     IBS (irritable bowel syndrome)     rare    Internal hemorrhoids     followed by Dr. Schilling    Nephrolithiasis     followed by Dr. Pope    Thyroid disease     Vitamin D deficiency        Past Surgical History:   Procedure Laterality Date    CATARACT EXTRACTION  11/19/12    right eye (toric)    CATARACT EXTRACTION W/  INTRAOCULAR LENS IMPLANT  11/26/12    left eye (toric)    EPIDURAL STEROID INJECTION INTO LUMBAR SPINE N/A 10/21/2021    Procedure: INJECTION, STEROID, SPINE, LUMBAR, EPIDURAL;  Surgeon: Marshall Lane MD;  [facility];  Service: Pain Management;  Laterality: N/A;  L5-S1    EXTRACORPOREAL SHOCK WAVE LITHOTRIPSY      EYE SURGERY      eyelid surgery    LITHOTRIPSY      REATTACHMENT OF MUSCLE Right 12/27/2019    Procedure: REATTACHMENT, MUSCLE;  Surgeon: Karoline Jaimes MD;  Location: [address];  Service: Ophthalmology;  Laterality: Right;    tonsillectomy      TRANSFORAMINAL EPIDURAL INJECTION OF STEROID Left 3/24/2020    Procedure: Injection,steroid,epidural,transforaminal approach;  Surgeon: Marshall Lane MD;  [facility];  Service: Pain Management;  Laterality: Left;  L4-5, L5-S1    TRANSFORAMINAL EPIDURAL INJECTION OF STEROID Left 7/28/2020    Procedure: INJECTION, STEROID, EPIDURAL, TRANSFORAMINAL APPROACH;  Surgeon: Marshall Lane MD;  [facility];  Service: Pain Management;  Laterality: Left;  L4-5, L5-S1  leave last on the schedule.  will not need to retest for covid.  Verify no fever/off antiinflammatories and no covid symptoms.       Current Outpatient Medications   Medication Sig    ALPRAZolam (XANAX) 0.25 MG tablet Take 1 tablet (0.25 mg total) by mouth every evening.    cholecalciferol, vitamin D3, 5,000 unit Tab Take 5,000 Units by mouth every other day.    cyclobenzaprine (FLEXERIL) 5 MG tablet Take 1  tablet (5 mg total) by mouth as needed for Muscle spasms.    dicyclomine (BENTYL) 20 mg tablet Take 1 tablet (20 mg total) by mouth every 8 (eight) hours as needed.    diphenhydrAMINE (BENADRYL) 25 mg capsule Take 25 mg by mouth nightly as needed for Allergies or Insomnia.    estradioL (ESTRACE) 0.01 % (0.1 mg/gram) vaginal cream Place 1 g vaginally twice a week.    levothyroxine (SYNTHROID) 112 MCG tablet Take 1 tablet (112 mcg total) by mouth before breakfast.    liothyronine (CYTOMEL) 5 MCG Tab Take 1 tablet (5 mcg total) by mouth once daily.    metoprolol succinate (TOPROL-XL) 25 MG 24 hr tablet TAKE 1 TABLET (25 MG TOTAL) BY MOUTH ONCE DAILY.     Current Facility-Administered Medications   Medication    dexAMETHasone injection 12 mg       Review of patient's allergies indicates:   Allergen Reactions    Sulfa (sulfonamide antibiotics) Itching and Other (See Comments)     Hyperventilation    Keflex [cephalexin] Other (See Comments)     Abd pain       Family History   Problem Relation Name Age of Onset    Hypertension Father      Stroke Father      Heart disease Mother          valve    Hypertension Mother      Heart disease Brother      Hypertension Brother      Diabetes Brother      Amblyopia Neg Hx      Blindness Neg Hx      Cancer Neg Hx      Cataracts Neg Hx      Glaucoma Neg Hx      Macular degeneration Neg Hx      Retinal detachment Neg Hx      Strabismus Neg Hx      Thyroid disease Neg Hx      Anesthesia problems Neg Hx      Clotting disorder Neg Hx      Breast cancer Neg Hx      Ovarian cancer Neg Hx         Social History     Socioeconomic History    Marital status:     Number of children: 2   Occupational History    Occupation: [employer]     Employer: [employer]   Tobacco Use    Smoking status: Never    Smokeless tobacco: Never   Substance and Sexual Activity    Alcohol use: Yes     Comment: q month    Drug use: No    Sexual activity: Yes     Partners: Male     Birth  control/protection: Post-menopausal     Social Drivers of Health     Financial Resource Strain: Low Risk  (7/26/2024)    Overall Financial Resource Strain (CARDIA)     Difficulty of Paying Living Expenses: Not hard at all   Food Insecurity: No Food Insecurity (7/26/2024)    Hunger Vital Sign     Worried About Running Out of Food in the Last Year: Never true     Ran Out of Food in the Last Year: Never true   Transportation Needs: No Transportation Needs (5/2/2024)    PRAPARE - Transportation     Lack of Transportation (Medical): No     Lack of Transportation (Non-Medical): No   Physical Activity: Insufficiently Active (7/26/2024)    Exercise Vital Sign     Days of Exercise per Week: 3 days     Minutes of Exercise per Session: 40 min   Stress: No Stress Concern Present (7/26/2024)    [employer] [place] [employer] - Occupational Stress Questionnaire     Feeling of Stress : Not at all   Housing Stability: Unknown (7/26/2024)    Housing Stability Vital Sign     Unable to Pay for Housing in the Last Year: No       Chief Complaint: No chief complaint on file.      History of present illness:  64-year-old female seen for bilateral knee pain.  Left worse than the right.  Pain started years ago.  It has got progressively worse in the last month.  Pain right around the kneecaps.  Hurts when she walks barefoot or when she does squats.  Has a lot of crunching underneath her kneecaps.  No previous treatments.  Pain is a 3/10.        Review of Systems:    Constitution: Negative for chills, fever, and sweats.  Negative for unexplained weight loss.    HENT:  Negative for headaches and blurry vision.    Cardiovascular:Negative for chest pain or irregular heart beat. Negative for hypertension.    Respiratory:  Negative for cough and shortness of breath.    Gastrointestinal: Negative for abdominal pain, heartburn, melena, nausea, and vomitting.    Genitourinary:  Negative bladder incontinence and dysuria.    Musculoskeletal:   See HPI    Neurological: Negative for numbness.    Psychiatric/Behavioral: Negative for depression.  The patient is not nervous/anxious.      Endocrine: Negative for polyuria    Hematologic/Lymphatic: Negative for bleeding problem.  Does not bruise/bleed easily.    Skin: Negative for poor would healing and rash      Physical Examination:    Vital Signs:  There were no vitals filed for this visit.    There is no height or weight on file to calculate BMI.    This a well-developed, well nourished patient in no acute distress.  They are alert and oriented and cooperative to examination.  Pt. walks without an antalgic gait.      Examination of the left knee shows no rashes or erythema. There are no masses ecchymosis or effusion. Patient has full range of motion from 0-130°. Patient is nontender to palpation over lateral joint line and nontender to palpation over the medial joint line. Patient has a - Lachman exam, - anterior drawer exam, and - posterior drawer exam. - Apley exam. Knee is stable to varus and valgus stress. 5 out of 5 motor strength. Palpable distal pulses. Intact light touch sensation.  Moderate Patellofemoral crepitus      X-rays:  X-rays of both knees are ordered and reviewed which show some mild arthritic changes of the right knee with a little more involved patellofemoral compartments         Assessment:  Left patellofemoral arthritis    Plan:  The findings with her today.  Talked about knee osteoarthritis swells as treatment.  I gave the patient a prescription of naproxen.  Patient will continue to ice and modify her exercises.  We talked about hyaluronic acid.  I gave her information about it.  She will call if she wants to progress with this.  Follow up as needed.            All previous pertinent notes including ER visits, physical therapy visits, other orthopedic visits as well as other care for the same musculoskeletal problem were reviewed.  All pertinent lab values and previous imaging was  reviewed pertinent to the current visit.    This note was created using [employer] voice recognition software that occasionally misinterpreted phrases or words.    Consult note is delivered via Epic messaging service.

## 2024-11-20 DIAGNOSIS — F51.01 PRIMARY INSOMNIA: ICD-10-CM

## 2024-11-20 DIAGNOSIS — F41.9 ANXIETY: ICD-10-CM

## 2024-11-20 RX ORDER — ALPRAZOLAM 0.25 MG/1
0.25 TABLET ORAL NIGHTLY
Qty: 30 TABLET | Refills: 5 | Status: SHIPPED | OUTPATIENT
Start: 2024-11-20

## 2024-11-20 NOTE — TELEPHONE ENCOUNTER
No care due was identified.  [facility] Embedded Care Due Messages. Reference number: [id number].   11/20/2024 12:56:11 PM CST

## 2024-11-25 ENCOUNTER — LAB VISIT (OUTPATIENT)
Dept: LAB | Facility: HOSPITAL | Age: 64
End: 2024-11-25
Attending: FAMILY MEDICINE
Payer: MEDICARE

## 2024-11-25 DIAGNOSIS — E03.9 HYPOTHYROIDISM, UNSPECIFIED TYPE: ICD-10-CM

## 2024-11-25 DIAGNOSIS — E78.5 HYPERLIPIDEMIA, UNSPECIFIED HYPERLIPIDEMIA TYPE: ICD-10-CM

## 2024-11-25 DIAGNOSIS — E55.9 VITAMIN D DEFICIENCY: ICD-10-CM

## 2024-11-25 LAB
25(OH)D3+25(OH)D2 SERPL-MCNC: 32 NG/ML (ref 30–96)
ALBUMIN SERPL BCP-MCNC: 4.2 G/DL (ref 3.5–5.2)
ALP SERPL-CCNC: 84 U/L (ref 40–150)
ALT SERPL W/O P-5'-P-CCNC: 12 U/L (ref 10–44)
ANION GAP SERPL CALC-SCNC: 12 MMOL/L (ref 8–16)
AST SERPL-CCNC: 17 U/L (ref 10–40)
BASOPHILS # BLD AUTO: 0.06 K/UL (ref 0–0.2)
BASOPHILS NFR BLD: 1.1 % (ref 0–1.9)
BILIRUB SERPL-MCNC: 0.5 MG/DL (ref 0.1–1)
BUN SERPL-MCNC: 16 MG/DL (ref 8–23)
CALCIUM SERPL-MCNC: 9.6 MG/DL (ref 8.7–10.5)
CHLORIDE SERPL-SCNC: 103 MMOL/L (ref 95–110)
CHOLEST SERPL-MCNC: 231 MG/DL (ref 120–199)
CHOLEST/HDLC SERPL: 2.8 {RATIO} (ref 2–5)
CO2 SERPL-SCNC: 23 MMOL/L (ref 23–29)
CREAT SERPL-MCNC: 0.8 MG/DL (ref 0.5–1.4)
DIFFERENTIAL METHOD BLD: ABNORMAL
EOSINOPHIL # BLD AUTO: 0.1 K/UL (ref 0–0.5)
EOSINOPHIL NFR BLD: 1.2 % (ref 0–8)
ERYTHROCYTE [DISTWIDTH] IN BLOOD BY AUTOMATED COUNT: 13.1 % (ref 11.5–14.5)
EST. GFR  (NO RACE VARIABLE): >60 ML/MIN/1.73 M^2
GLUCOSE SERPL-MCNC: 92 MG/DL (ref 70–110)
HCT VFR BLD AUTO: 42.6 % (ref 37–48.5)
HDLC SERPL-MCNC: 82 MG/DL (ref 40–75)
HDLC SERPL: 35.5 % (ref 20–50)
HGB BLD-MCNC: 14.4 G/DL (ref 12–16)
IMM GRANULOCYTES # BLD AUTO: 0.02 K/UL (ref 0–0.04)
IMM GRANULOCYTES NFR BLD AUTO: 0.4 % (ref 0–0.5)
LDLC SERPL CALC-MCNC: 137 MG/DL (ref 63–159)
LYMPHOCYTES # BLD AUTO: 1.4 K/UL (ref 1–4.8)
LYMPHOCYTES NFR BLD: 24.9 % (ref 18–48)
MCH RBC QN AUTO: 31.6 PG (ref 27–31)
MCHC RBC AUTO-ENTMCNC: 33.8 G/DL (ref 32–36)
MCV RBC AUTO: 94 FL (ref 82–98)
MONOCYTES # BLD AUTO: 0.5 K/UL (ref 0.3–1)
MONOCYTES NFR BLD: 8.7 % (ref 4–15)
NEUTROPHILS # BLD AUTO: 3.6 K/UL (ref 1.8–7.7)
NEUTROPHILS NFR BLD: 63.7 % (ref 38–73)
NONHDLC SERPL-MCNC: 149 MG/DL
NRBC BLD-RTO: 0 /100 WBC
PLATELET # BLD AUTO: 306 K/UL (ref 150–450)
PMV BLD AUTO: 11.6 FL (ref 9.2–12.9)
POTASSIUM SERPL-SCNC: 4.3 MMOL/L (ref 3.5–5.1)
PROT SERPL-MCNC: 7.6 G/DL (ref 6–8.4)
RBC # BLD AUTO: 4.55 M/UL (ref 4–5.4)
SODIUM SERPL-SCNC: 138 MMOL/L (ref 136–145)
TRIGL SERPL-MCNC: 60 MG/DL (ref 30–150)
WBC # BLD AUTO: 5.66 K/UL (ref 3.9–12.7)

## 2024-11-25 PROCEDURE — 82306 VITAMIN D 25 HYDROXY: CPT | Performed by: FAMILY MEDICINE

## 2024-11-25 PROCEDURE — 80053 COMPREHEN METABOLIC PANEL: CPT | Performed by: FAMILY MEDICINE

## 2024-11-25 PROCEDURE — 85025 COMPLETE CBC W/AUTO DIFF WBC: CPT | Performed by: FAMILY MEDICINE

## 2024-11-25 PROCEDURE — 80061 LIPID PANEL: CPT | Performed by: FAMILY MEDICINE

## 2024-11-25 PROCEDURE — 36415 COLL VENOUS BLD VENIPUNCTURE: CPT | Mod: PO | Performed by: FAMILY MEDICINE

## 2024-11-27 ENCOUNTER — OFFICE VISIT (OUTPATIENT)
Dept: FAMILY MEDICINE | Facility: CLINIC | Age: 64
End: 2024-11-27
Payer: MEDICARE

## 2024-11-27 ENCOUNTER — TELEPHONE (OUTPATIENT)
Dept: FAMILY MEDICINE | Facility: CLINIC | Age: 64
End: 2024-11-27

## 2024-11-27 VITALS
HEART RATE: 86 BPM | SYSTOLIC BLOOD PRESSURE: 122 MMHG | RESPIRATION RATE: 18 BRPM | DIASTOLIC BLOOD PRESSURE: 70 MMHG | HEIGHT: 64 IN | OXYGEN SATURATION: 96 % | BODY MASS INDEX: 25.33 KG/M2 | WEIGHT: 148.38 LBS | TEMPERATURE: 99 F

## 2024-11-27 DIAGNOSIS — J10.1 INFLUENZA A: Primary | ICD-10-CM

## 2024-11-27 LAB
CTP QC/QA: YES
CTP QC/QA: YES
POC MOLECULAR INFLUENZA A AGN: POSITIVE
POC MOLECULAR INFLUENZA B AGN: NEGATIVE
SARS-COV-2 RDRP RESP QL NAA+PROBE: NEGATIVE

## 2024-11-27 PROCEDURE — 99999PBSHW POCT INFLUENZA A/B MOLECULAR: Mod: PBBFAC,,,

## 2024-11-27 PROCEDURE — 99999PBSHW: Mod: PBBFAC,,,

## 2024-11-27 PROCEDURE — 87502 INFLUENZA DNA AMP PROBE: CPT | Mod: PBBFAC,PO | Performed by: FAMILY MEDICINE

## 2024-11-27 PROCEDURE — 99214 OFFICE O/P EST MOD 30 MIN: CPT | Mod: PBBFAC,PO | Performed by: FAMILY MEDICINE

## 2024-11-27 PROCEDURE — 87635 SARS-COV-2 COVID-19 AMP PRB: CPT | Mod: PBBFAC,PO | Performed by: FAMILY MEDICINE

## 2024-11-27 PROCEDURE — 99213 OFFICE O/P EST LOW 20 MIN: CPT | Mod: S$PBB,,, | Performed by: FAMILY MEDICINE

## 2024-11-27 PROCEDURE — 99999 PR PBB SHADOW E&M-EST. PATIENT-LVL IV: CPT | Mod: PBBFAC,,, | Performed by: FAMILY MEDICINE

## 2024-11-27 RX ORDER — PROMETHAZINE HYDROCHLORIDE 25 MG/1
25 TABLET ORAL EVERY 6 HOURS PRN
Qty: 20 TABLET | Refills: 1 | Status: SHIPPED | OUTPATIENT
Start: 2024-11-27

## 2024-11-27 RX ORDER — FAMOTIDINE 40 MG/1
TABLET, FILM COATED ORAL
COMMUNITY
Start: 2024-11-19

## 2024-11-27 RX ORDER — HYDROCODONE POLISTIREX AND CHLORPHENIRAMINE POLISTIREX 10; 8 MG/5ML; MG/5ML
5 SUSPENSION, EXTENDED RELEASE ORAL EVERY 12 HOURS PRN
Qty: 70 ML | Refills: 0 | Status: SHIPPED | OUTPATIENT
Start: 2024-11-27

## 2024-11-27 RX ORDER — OSELTAMIVIR PHOSPHATE 75 MG/1
75 CAPSULE ORAL 2 TIMES DAILY
Qty: 10 CAPSULE | Refills: 0 | Status: SHIPPED | OUTPATIENT
Start: 2024-11-27 | End: 2024-12-02

## 2024-11-27 NOTE — TELEPHONE ENCOUNTER
Spoke with [facility]   None of the pharmacy have it in stock      Patient notified        ----- Message from Alea sent at 11/27/2024  3:51 PM CST -----  Regarding: call back  Contact: pt  Type: Needs Medical Advice  Who Called:  patient   Symptoms (please be specific):    How long has patient had these symptoms:    Pharmacy name and phone #:    [facility]        Best Call Back Number: [phone]    Additional Information: still waiting on prescription     hydrocodone-chlorpheniramine (TUSSIONEX) 10-8 mg/5 mL suspension    Call once sent thanks

## 2024-11-27 NOTE — PROGRESS NOTES
Subjective:       Patient ID: Lexi Amezquita is a 64 y.o. female.    Chief Complaint: No chief complaint on file.    New to me patient here for UC visit.  Onset yesterday with chills, fever, aches, ST, C&C and nausea.  Flu A positive.  Covid negative.    Review of Systems   Constitutional:  Positive for chills, diaphoresis, fatigue and fever.   HENT:  Positive for congestion, rhinorrhea and sore throat.    Respiratory:  Positive for cough. Negative for shortness of breath.    Cardiovascular:  Negative for chest pain.   Gastrointestinal:  Positive for nausea. Negative for abdominal pain and vomiting.   Skin:  Negative for rash.   All other systems reviewed and are negative.      Objective:      Physical Exam  Vitals reviewed.   Constitutional:       General: She is not in acute distress.     Appearance: She is well-developed.   HENT:      Right Ear: Tympanic membrane normal. Tympanic membrane is not erythematous.      Left Ear: Tympanic membrane normal. Tympanic membrane is not erythematous.      Nose: Mucosal edema present.      Right Sinus: No maxillary sinus tenderness.      Left Sinus: No maxillary sinus tenderness.      Mouth/Throat:      Pharynx: Posterior oropharyngeal erythema present.   Cardiovascular:      Rate and Rhythm: Normal rate and regular rhythm.      Heart sounds: No murmur heard.  Pulmonary:      Effort: Pulmonary effort is normal.      Breath sounds: Normal breath sounds.   Musculoskeletal:      Cervical back: Neck supple.   Lymphadenopathy:      Cervical: No cervical adenopathy.   Skin:     General: Skin is warm and dry.   Neurological:      Mental Status: She is alert.         Assessment:       1. Influenza A        Plan:       Influenza A  -     promethazine (PHENERGAN) 25 MG tablet; Take 1 tablet (25 mg total) by mouth every 6 (six) hours as needed for Nausea.  Dispense: 20 tablet; Refill: 1  -     oseltamivir (TAMIFLU) 75 MG capsule; Take 1 capsule (75 mg total) by mouth 2 (two)  times daily. for 10 doses  Dispense: 10 capsule; Refill: 0  -     hydrocodone-chlorpheniramine (TUSSIONEX) 10-8 mg/5 mL suspension; Take 5 mLs by mouth every 12 (twelve) hours as needed for Cough.  Dispense: 70 mL; Refill: 0  -     POCT COVID-19 Rapid Screening  -     POCT Influenza A/B Molecular      Patient Instructions   Push fluids intake.  Drink plenty of water.     Contact your PCP if any worsening or for any new concerns as we discussed.

## 2024-12-04 ENCOUNTER — OFFICE VISIT (OUTPATIENT)
Dept: FAMILY MEDICINE | Facility: CLINIC | Age: 64
End: 2024-12-04
Payer: MEDICARE

## 2024-12-04 VITALS
BODY MASS INDEX: 24.88 KG/M2 | OXYGEN SATURATION: 96 % | WEIGHT: 145.75 LBS | HEART RATE: 73 BPM | HEIGHT: 64 IN | SYSTOLIC BLOOD PRESSURE: 110 MMHG | DIASTOLIC BLOOD PRESSURE: 68 MMHG

## 2024-12-04 DIAGNOSIS — F41.9 ANXIETY: ICD-10-CM

## 2024-12-04 DIAGNOSIS — E03.9 HYPOTHYROIDISM, UNSPECIFIED TYPE: Primary | ICD-10-CM

## 2024-12-04 DIAGNOSIS — F51.01 PRIMARY INSOMNIA: ICD-10-CM

## 2024-12-04 PROCEDURE — G2211 COMPLEX E/M VISIT ADD ON: HCPCS | Mod: S$PBB,,, | Performed by: FAMILY MEDICINE

## 2024-12-04 PROCEDURE — 99213 OFFICE O/P EST LOW 20 MIN: CPT | Mod: PBBFAC,PO | Performed by: FAMILY MEDICINE

## 2024-12-04 PROCEDURE — 99214 OFFICE O/P EST MOD 30 MIN: CPT | Mod: S$PBB,,, | Performed by: FAMILY MEDICINE

## 2024-12-04 PROCEDURE — 99999 PR PBB SHADOW E&M-EST. PATIENT-LVL III: CPT | Mod: PBBFAC,,, | Performed by: FAMILY MEDICINE

## 2024-12-04 NOTE — PROGRESS NOTES
Subjective:       Patient ID: Lexi Amezquita is a 64 y.o. female.    Chief Complaint: Follow-up    Patient presents with:  Follow-up    Here for f/u on chronic health issues    She is overall feeling well.    Recovering from flu. Some residual cough.    Graves disease, hypothyroidsm - s/p JOHNS; taking Cytomel and synthroid; following with endocrinology  Palpitations, PVCs  - taking Toprol XL 25mg daily  Anxiety - some goes day and bad days; some anxiety at bedtime; using benadryl at night; using Xanax 0.5mg 1/2 tab PRN insomnia or panic  Uses xanax primarily after getting steroid injections for control of her shoulder and low back pains.      Medication Refill  Pertinent negatives include no abdominal pain, arthralgias, chest pain, chills, coughing, fever, headaches, joint swelling, nausea, neck pain, numbness, rash, sore throat, vomiting or weakness.   Follow-up  Pertinent negatives include no abdominal pain, arthralgias, chest pain, chills, coughing, fever, headaches, joint swelling, nausea, neck pain, numbness, rash, sore throat, vomiting or weakness.       Past Medical History:   Diagnosis Date    Abnormal Pap smear     Abnormal Pap smear of cervix     Anxiety     Arthritis     Cataract     Grave's disease     Grave's disease     IBS (irritable bowel syndrome)     rare    Internal hemorrhoids     followed by Dr. Schilling    Nephrolithiasis     followed by Dr. Pope    Thyroid disease     Vitamin D deficiency        Past Surgical History:   Procedure Laterality Date    CATARACT EXTRACTION  11/19/12    right eye (toric)    CATARACT EXTRACTION W/  INTRAOCULAR LENS IMPLANT  11/26/12    left eye (toric)    EPIDURAL STEROID INJECTION INTO LUMBAR SPINE N/A 10/21/2021    Procedure: INJECTION, STEROID, SPINE, LUMBAR, EPIDURAL;  Surgeon: Marshall Lane MD;  Location: [facility] OR;  Service: Pain Management;  Laterality: N/A;  L5-S1    EXTRACORPOREAL SHOCK WAVE LITHOTRIPSY      EYE SURGERY      eyelid surgery     LITHOTRIPSY      REATTACHMENT OF MUSCLE Right 12/27/2019    Procedure: REATTACHMENT, MUSCLE;  Surgeon: Karoline Jaimes MD;  Location: [address];  Service: Ophthalmology;  Laterality: Right;    tonsillectomy      TRANSFORAMINAL EPIDURAL INJECTION OF STEROID Left 3/24/2020    Procedure: Injection,steroid,epidural,transforaminal approach;  Surgeon: Marshall Lane MD;  Location: [facility] OR;  Service: Pain Management;  Laterality: Left;  L4-5, L5-S1    TRANSFORAMINAL EPIDURAL INJECTION OF STEROID Left 7/28/2020    Procedure: INJECTION, STEROID, EPIDURAL, TRANSFORAMINAL APPROACH;  Surgeon: Marshall Lane MD;  Location: [facility] OR;  Service: Pain Management;  Laterality: Left;  L4-5, L5-S1  leave last on the schedule.  will not need to retest for covid.  Verify no fever/off antiinflammatories and no covid symptoms.       Review of patient's allergies indicates:   Allergen Reactions    Sulfa (sulfonamide antibiotics) Itching and Other (See Comments)     Hyperventilation    Keflex [cephalexin] Other (See Comments)     Abd pain       Social History     Socioeconomic History    Marital status:     Number of children: 2   Occupational History    Occupation: [employer]     Employer: [employer]   Tobacco Use    Smoking status: Never    Smokeless tobacco: Never   Substance and Sexual Activity    Alcohol use: Yes     Comment: q month    Drug use: No    Sexual activity: Yes     Partners: Male     Birth control/protection: Post-menopausal     Social Drivers of Health     Financial Resource Strain: Low Risk  (7/26/2024)    Overall Financial Resource Strain (CARDIA)     Difficulty of Paying Living Expenses: Not hard at all   Food Insecurity: No Food Insecurity (7/26/2024)    Hunger Vital Sign     Worried About Running Out of Food in the Last Year: Never true     Ran Out of Food in the Last Year: Never true   Transportation Needs: No Transportation Needs (5/2/2024)    PRAPARE - Transportation     Lack of Transportation  (Medical): No     Lack of Transportation (Non-Medical): No   Physical Activity: Insufficiently Active (7/26/2024)    Exercise Vital Sign     Days of Exercise per Week: 3 days     Minutes of Exercise per Session: 40 min   Stress: No Stress Concern Present (7/26/2024)    [employer] [place] [employer] - Occupational Stress Questionnaire     Feeling of Stress : Not at all   Housing Stability: Unknown (7/26/2024)    Housing Stability Vital Sign     Unable to Pay for Housing in the Last Year: No       Current Outpatient Medications on File Prior to Visit   Medication Sig Dispense Refill    ALPRAZolam (XANAX) 0.25 MG tablet Take 1 tablet (0.25 mg total) by mouth every evening. 30 tablet 5    cholecalciferol, vitamin D3, 5,000 unit Tab Take 5,000 Units by mouth every other day.      cyclobenzaprine (FLEXERIL) 5 MG tablet Take 1 tablet (5 mg total) by mouth as needed for Muscle spasms. 30 tablet 5    dicyclomine (BENTYL) 20 mg tablet Take 1 tablet (20 mg total) by mouth every 8 (eight) hours as needed. 30 tablet 5    diphenhydrAMINE (BENADRYL) 25 mg capsule Take 25 mg by mouth nightly as needed for Allergies or Insomnia.      estradioL (ESTRACE) 0.01 % (0.1 mg/gram) vaginal cream Place 1 g vaginally twice a week. 42.5 g 0    levothyroxine (SYNTHROID) 112 MCG tablet Take 1 tablet (112 mcg total) by mouth before breakfast. 90 tablet 3    liothyronine (CYTOMEL) 5 MCG Tab Take 1 tablet (5 mcg total) by mouth once daily. 90 tablet 3    metoprolol succinate (TOPROL-XL) 25 MG 24 hr tablet TAKE 1 TABLET (25 MG TOTAL) BY MOUTH ONCE DAILY. 90 tablet 3    naproxen (NAPROSYN) 500 MG tablet Take 1 tablet (500 mg total) by mouth 2 (two) times daily with meals. 60 tablet 2    promethazine (PHENERGAN) 25 MG tablet Take 1 tablet (25 mg total) by mouth every 6 (six) hours as needed for Nausea. 20 tablet 1    famotidine (PEPCID) 40 MG tablet  (Patient not taking: Reported on 12/4/2024)      hydrocodone-chlorpheniramine (TUSSIONEX)  "10-8 mg/5 mL suspension Take 5 mLs by mouth every 12 (twelve) hours as needed for Cough. (Patient not taking: Reported on 12/4/2024) 70 mL 0     No current facility-administered medications on file prior to visit.       Family History   Problem Relation Name Age of Onset    Hypertension Father      Stroke Father      Heart disease Mother          valve    Hypertension Mother      Heart disease Brother      Hypertension Brother      Diabetes Brother      Amblyopia Neg Hx      Blindness Neg Hx      Cancer Neg Hx      Cataracts Neg Hx      Glaucoma Neg Hx      Macular degeneration Neg Hx      Retinal detachment Neg Hx      Strabismus Neg Hx      Thyroid disease Neg Hx      Anesthesia problems Neg Hx      Clotting disorder Neg Hx      Breast cancer Neg Hx      Ovarian cancer Neg Hx         Review of Systems   Constitutional:  Negative for activity change, appetite change, chills, fever and unexpected weight change.   HENT:  Negative for hearing loss, rhinorrhea, sore throat and trouble swallowing.    Eyes:  Negative for pain, discharge and visual disturbance.   Respiratory:  Negative for cough, chest tightness, shortness of breath and wheezing.    Cardiovascular:  Negative for chest pain and palpitations.   Gastrointestinal:  Negative for abdominal pain, blood in stool, constipation, diarrhea, nausea and vomiting.   Endocrine: Negative for polydipsia and polyuria.   Genitourinary:  Negative for difficulty urinating, dysuria, hematuria and menstrual problem.   Musculoskeletal:  Negative for arthralgias, gait problem, joint swelling and neck pain.   Skin:  Negative for rash and wound.   Neurological:  Negative for dizziness, weakness, numbness and headaches.   Hematological:  Negative for adenopathy.   Psychiatric/Behavioral:  Negative for confusion and dysphoric mood. The patient is nervous/anxious.        Objective:      /68   Pulse 73   Ht 5' 4" (1.626 m)   Wt 66.1 kg (145 lb 11.6 oz)   SpO2 96%   BMI 25.01 " kg/m²   Physical Exam  Constitutional:       Appearance: She is well-developed.   HENT:      Head: Normocephalic and atraumatic.   Eyes:      Pupils: Pupils are equal, round, and reactive to light.   Pulmonary:      Effort: Pulmonary effort is normal.   Musculoskeletal:      Cervical back: Neck supple.   Neurological:      Mental Status: She is alert and oriented to person, place, and time.   Psychiatric:         Behavior: Behavior normal.         Thought Content: Thought content normal.         Judgment: Judgment normal.         Results for orders placed or performed in visit on 11/27/24   POCT COVID-19 Rapid Screening    Collection Time: 11/27/24  1:50 PM   Result Value Ref Range    POC Rapid COVID Negative Negative     Acceptable Yes    POCT Influenza A/B Molecular    Collection Time: 11/27/24  1:50 PM   Result Value Ref Range    POC Molecular Influenza A Ag Positive (A) Negative    POC Molecular Influenza B Ag Negative Negative     Acceptable Yes      Labs 11/25/24 reviewed    Assessment:       1. Hypothyroidism, unspecified type    2. Primary insomnia    3. Anxiety              Plan:       Hypothyroidism, unspecified type    Primary insomnia    Anxiety              Continue xanax PRN  Continue current medications and specialty follow-up  Counseled on regular exercise, maintenance of a healthy weight, balanced diet rich in fruits/vegetables and lean protein, and avoidance of unhealthy habits like smoking and excessive alcohol intake.  F/u annually or PRN    Visit today included increased complexity associated with the care of the episodic problems listed above addressed and managing the longitudinal care of the patient due to the serious and/or complex managed problem(s) listed above.

## 2024-12-30 RX ORDER — LIOTHYRONINE SODIUM 5 UG/1
5 TABLET ORAL DAILY
Qty: 90 TABLET | Refills: 3 | Status: SHIPPED | OUTPATIENT
Start: 2024-12-30

## 2025-01-06 ENCOUNTER — OFFICE VISIT (OUTPATIENT)
Dept: OBSTETRICS AND GYNECOLOGY | Facility: CLINIC | Age: 65
End: 2025-01-06
Payer: MEDICARE

## 2025-01-06 VITALS
SYSTOLIC BLOOD PRESSURE: 110 MMHG | DIASTOLIC BLOOD PRESSURE: 72 MMHG | HEIGHT: 64 IN | WEIGHT: 144.19 LBS | BODY MASS INDEX: 24.62 KG/M2

## 2025-01-06 DIAGNOSIS — Z01.419 ENCOUNTER FOR WELL WOMAN EXAM WITH ROUTINE GYNECOLOGICAL EXAM: Primary | ICD-10-CM

## 2025-01-06 DIAGNOSIS — N95.2 VAGINAL ATROPHY: ICD-10-CM

## 2025-01-06 DIAGNOSIS — Z78.0 POSTMENOPAUSAL: ICD-10-CM

## 2025-01-06 PROCEDURE — 99999 PR PBB SHADOW E&M-EST. PATIENT-LVL IV: CPT | Mod: PBBFAC,,,

## 2025-01-06 PROCEDURE — 99214 OFFICE O/P EST MOD 30 MIN: CPT | Mod: PBBFAC,PN

## 2025-01-06 PROCEDURE — 88175 CYTOPATH C/V AUTO FLUID REDO: CPT

## 2025-01-06 PROCEDURE — G0101 CA SCREEN;PELVIC/BREAST EXAM: HCPCS | Mod: S$PBB,,,

## 2025-01-06 RX ORDER — ESTRADIOL 10 UG/1
10 INSERT VAGINAL
Qty: 24 TABLET | Refills: 3 | Status: SHIPPED | OUTPATIENT
Start: 2025-01-06 | End: 2026-01-06

## 2025-01-06 NOTE — PROGRESS NOTES
HISTORY OF PRESENT ILLNESS:    Lexi Amezquita is a 64 y.o. female, , No LMP recorded. Patient is postmenopausal.,  presents for a routine annual exam .   Last pap:  - NILM   Last mammogram:  NL TC 2.54%  SA: male partner, does not use condoms   Sexually transmitted infection risk: very low risk of STD exposure.     Prescribed Estrace cream for [facility]. Interested in switching to vaginal pill     This is the extent of the patient's complaints at this time.     Past Medical History:   Diagnosis Date    Abnormal Pap smear     Abnormal Pap smear of cervix     Anxiety     Arthritis     Cataract     Grave's disease     Grave's disease     IBS (irritable bowel syndrome)     rare    Internal hemorrhoids     followed by Dr. Schilling    Nephrolithiasis     followed by Dr. Pope    Thyroid disease     Vitamin D deficiency        Past Surgical History:   Procedure Laterality Date    CATARACT EXTRACTION  12    right eye (toric)    CATARACT EXTRACTION W/  INTRAOCULAR LENS IMPLANT  12    left eye (toric)    EPIDURAL STEROID INJECTION INTO LUMBAR SPINE N/A 10/21/2021    Procedure: INJECTION, STEROID, SPINE, LUMBAR, EPIDURAL;  Surgeon: Marshall Lane MD;  Location: [facility];  Service: Pain Management;  Laterality: N/A;  L5-S1    EXTRACORPOREAL SHOCK WAVE LITHOTRIPSY      EYE SURGERY      eyelid surgery    LITHOTRIPSY      REATTACHMENT OF MUSCLE Right 2019    Procedure: REATTACHMENT, MUSCLE;  Surgeon: Karoline Jaimes MD;  Location: [address];  Service: Ophthalmology;  Laterality: Right;    tonsillectomy      TRANSFORAMINAL EPIDURAL INJECTION OF STEROID Left 3/24/2020    Procedure: Injection,steroid,epidural,transforaminal approach;  Surgeon: Marshall Lane MD;  Location: [facility];  Service: Pain Management;  Laterality: Left;  L4-5, L5-S1    TRANSFORAMINAL EPIDURAL INJECTION OF STEROID Left 2020    Procedure: INJECTION, STEROID, EPIDURAL, TRANSFORAMINAL APPROACH;  Surgeon: Marshall Lane MD;   Location: [facility] OR;  Service: Pain Management;  Laterality: Left;  L4-5, L5-S1  leave last on the schedule.  will not need to retest for covid.  Verify no fever/off antiinflammatories and no covid symptoms.       MEDICATIONS AND ALLERGIES:      Current Outpatient Medications:     ALPRAZolam (XANAX) 0.25 MG tablet, Take 1 tablet (0.25 mg total) by mouth every evening., Disp: 30 tablet, Rfl: 5    cholecalciferol, vitamin D3, 5,000 unit Tab, Take 5,000 Units by mouth every other day., Disp: , Rfl:     cyclobenzaprine (FLEXERIL) 5 MG tablet, Take 1 tablet (5 mg total) by mouth as needed for Muscle spasms., Disp: 30 tablet, Rfl: 5    dicyclomine (BENTYL) 20 mg tablet, Take 1 tablet (20 mg total) by mouth every 8 (eight) hours as needed., Disp: 30 tablet, Rfl: 5    diphenhydrAMINE (BENADRYL) 25 mg capsule, Take 25 mg by mouth nightly as needed for Allergies or Insomnia., Disp: , Rfl:     estradioL (ESTRACE) 0.01 % (0.1 mg/gram) vaginal cream, Place 1 g vaginally twice a week., Disp: 42.5 g, Rfl: 0    levothyroxine (SYNTHROID) 112 MCG tablet, Take 1 tablet (112 mcg total) by mouth before breakfast., Disp: 90 tablet, Rfl: 3    liothyronine (CYTOMEL) 5 MCG Tab, Take 1 tablet (5 mcg total) by mouth once daily., Disp: 90 tablet, Rfl: 3    metoprolol succinate (TOPROL-XL) 25 MG 24 hr tablet, TAKE 1 TABLET (25 MG TOTAL) BY MOUTH ONCE DAILY., Disp: 90 tablet, Rfl: 3    naproxen (NAPROSYN) 500 MG tablet, Take 1 tablet (500 mg total) by mouth 2 (two) times daily with meals., Disp: 60 tablet, Rfl: 2    estradioL (VAGIFEM) 10 mcg Tab, Place 1 tablet (10 mcg total) vaginally twice a week., Disp: 24 tablet, Rfl: 3    famotidine (PEPCID) 40 MG tablet, , Disp: , Rfl:     hydrocodone-chlorpheniramine (TUSSIONEX) 10-8 mg/5 mL suspension, Take 5 mLs by mouth every 12 (twelve) hours as needed for Cough. (Patient not taking: Reported on 1/6/2025), Disp: 70 mL, Rfl: 0    promethazine (PHENERGAN) 25 MG tablet, Take 1 tablet (25 mg total) by  mouth every 6 (six) hours as needed for Nausea. (Patient not taking: Reported on 1/6/2025), Disp: 20 tablet, Rfl: 1    Review of patient's allergies indicates:   Allergen Reactions    Sulfa (sulfonamide antibiotics) Itching and Other (See Comments)     Hyperventilation    Keflex [cephalexin] Other (See Comments)     Abd pain       Family History   Problem Relation Name Age of Onset    Hypertension Father      Stroke Father      Heart disease Mother          valve    Hypertension Mother      Heart disease Brother      Hypertension Brother      Diabetes Brother      Amblyopia Neg Hx      Blindness Neg Hx      Cancer Neg Hx      Cataracts Neg Hx      Glaucoma Neg Hx      Macular degeneration Neg Hx      Retinal detachment Neg Hx      Strabismus Neg Hx      Thyroid disease Neg Hx      Anesthesia problems Neg Hx      Clotting disorder Neg Hx      Breast cancer Neg Hx      Ovarian cancer Neg Hx         Social History     Socioeconomic History    Marital status:     Number of children: 2   Occupational History    Occupation: [employer]     Employer: [employer]   Tobacco Use    Smoking status: Never    Smokeless tobacco: Never   Substance and Sexual Activity    Alcohol use: Yes     Comment: q month    Drug use: No    Sexual activity: Yes     Partners: Male     Birth control/protection: Post-menopausal     Social Drivers of Health     Financial Resource Strain: Low Risk  (7/26/2024)    Overall Financial Resource Strain (CARDIA)     Difficulty of Paying Living Expenses: Not hard at all   Food Insecurity: No Food Insecurity (7/26/2024)    Hunger Vital Sign     Worried About Running Out of Food in the Last Year: Never true     Ran Out of Food in the Last Year: Never true   Transportation Needs: No Transportation Needs (5/2/2024)    PRAPARE - Transportation     Lack of Transportation (Medical): No     Lack of Transportation (Non-Medical): No   Physical Activity: Insufficiently Active (7/26/2024)    Exercise  "Vital Sign     Days of Exercise per Week: 3 days     Minutes of Exercise per Session: 40 min   Stress: No Stress Concern Present (2024)    [employer] [place] [place] [employer] - Occupational Stress Questionnaire     Feeling of Stress : Not at all   Housing Stability: Unknown (2024)    Housing Stability Vital Sign     Unable to Pay for Housing in the Last Year: No       OB History    Para Term  AB Living   4 2 1   2 2   SAB IAB Ectopic Multiple Live Births     2     1      # Outcome Date GA Lbr Cm/2nd Weight Sex Type Anes PTL Lv   4 Term     M Vag-Spont None N FRANCOIS   3 IAB            2 IAB            1 Para     M Vag-Spont Local            COMPREHENSIVE GYN HISTORY:  PAP History: Denies abnormal Paps.  Infection History: Denies STDs. Denies PID.  Benign History: Denies uterine fibroids. Denies ovarian cysts. Denies endometriosis. Denies other conditions.  Cancer History: Denies cervical cancer. Denies uterine cancer or hyperplasia. Denies ovarian cancer. Denies vulvar cancer or pre-cancer. Denies vaginal cancer or pre-cancer. Denies breast cancer. Denies colon cancer.      ROS:  GENERAL: No weight changes. No swelling. No fatigue. No fever.  BREASTS: No pain. No lumps. No discharge.  ABDOMEN: No pain. No nausea. No vomiting. No diarrhea. No constipation.  REPRODUCTIVE: No abnormal bleeding. No pelvic pain.   VULVA: No pain. No lesions. No itching.  VAGINA: No relaxation. No itching. No odor. No discharge. No lesions.  URINARY: No incontinence. No nocturia. No frequency. No dysuria.    /72 (Patient Position: Sitting)   Ht 5' 4" (1.626 m)   Wt 65.4 kg (144 lb 2.9 oz)   BMI 24.75 kg/m²     PE:  Physical Exam:   Constitutional: She is oriented to person, place, and time. She appears well-developed and well-nourished. No distress.    HENT:   Head: Normocephalic.    Eyes: Pupils are equal, round, and reactive to light.      Pulmonary/Chest: Effort normal. No respiratory distress. " She exhibits no mass, no tenderness, no laceration, no edema, no deformity, no swelling and no retraction. Right breast exhibits no inverted nipple, no mass, no nipple discharge, no skin change, no tenderness, no bleeding and no swelling. Left breast exhibits no inverted nipple, no mass, no nipple discharge, no skin change, no tenderness, no bleeding and no swelling.        Abdominal: Soft. She exhibits no distension. There is no abdominal tenderness.     Genitourinary:    Inguinal canal, vagina, uterus, right adnexa and left adnexa normal.      Pelvic exam was performed with patient supine.   The external female genitalia was normal.     Labial bartholins normal.There is no rash, tenderness or lesion on the right labia. There is no rash, tenderness or lesion on the left labia. Cervix is normal. Vagina exhibits no lesion. No erythema, vaginal discharge, tenderness or bleeding in the vagina.    No signs of injury in the vagina.   Vaginal atrophy noted.    pap smear completed          Musculoskeletal: Normal range of motion and moves all extremeties.       Neurological: She is alert and oriented to person, place, and time.    Skin: Skin is warm and dry.    Psychiatric: She has a normal mood and affect. Judgment and thought content normal.        PROCEDURES/ORDERS:  Pap      Assessment/Plan:    Encounter for well woman exam with routine gynecological exam  -     Liquid-Based Pap Smear, Screening    Vaginal atrophy  -     estradioL (VAGIFEM) 10 mcg Tab; Place 1 tablet (10 mcg total) vaginally twice a week.  Dispense: 24 tablet; Refill: 3        - Understands potential associated cost. Will continue Estrace if out of budget    Postmenopausal  -     DXA Bone Density Axial Skeleton 1 or more sites; Future; Expected date: 01/06/2025        COUNSELING:  The patient was counseled today on:  -A.C.S. Pap and pelvic exam guidelines, recomendations for yearly mammogram, monthly self breast exams and to follow up with her PCP for  other health maintenance.    FOLLOW-UP  annually for [employer].

## 2025-01-09 LAB
FINAL PATHOLOGIC DIAGNOSIS: NORMAL
Lab: NORMAL

## 2025-02-02 DIAGNOSIS — M25.569 KNEE PAIN, UNSPECIFIED CHRONICITY, UNSPECIFIED LATERALITY: ICD-10-CM

## 2025-02-02 DIAGNOSIS — M17.10 ARTHRITIS OF KNEE: ICD-10-CM

## 2025-02-03 RX ORDER — NAPROXEN 500 MG/1
500 TABLET ORAL 2 TIMES DAILY WITH MEALS
Qty: 60 TABLET | Refills: 2 | Status: SHIPPED | OUTPATIENT
Start: 2025-02-03

## 2025-02-22 DIAGNOSIS — Z00.00 ENCOUNTER FOR MEDICARE ANNUAL WELLNESS EXAM: ICD-10-CM

## 2025-03-21 ENCOUNTER — OFFICE VISIT (OUTPATIENT)
Dept: FAMILY MEDICINE | Facility: CLINIC | Age: 65
End: 2025-03-21
Payer: MEDICARE

## 2025-03-21 VITALS
WEIGHT: 143.75 LBS | HEIGHT: 64 IN | DIASTOLIC BLOOD PRESSURE: 70 MMHG | BODY MASS INDEX: 24.54 KG/M2 | SYSTOLIC BLOOD PRESSURE: 102 MMHG | OXYGEN SATURATION: 99 % | HEART RATE: 63 BPM | TEMPERATURE: 99 F

## 2025-03-21 DIAGNOSIS — L53.9 REDNESS OF SKIN: ICD-10-CM

## 2025-03-21 DIAGNOSIS — T14.8XXA WOUND OF SKIN: Primary | ICD-10-CM

## 2025-03-21 PROCEDURE — 99214 OFFICE O/P EST MOD 30 MIN: CPT | Mod: PBBFAC,PO | Performed by: NURSE PRACTITIONER

## 2025-03-21 PROCEDURE — 99999 PR PBB SHADOW E&M-EST. PATIENT-LVL IV: CPT | Mod: PBBFAC,,, | Performed by: NURSE PRACTITIONER

## 2025-03-21 RX ORDER — DOXYCYCLINE 100 MG/1
100 CAPSULE ORAL EVERY 12 HOURS
Qty: 20 CAPSULE | Refills: 0 | Status: SHIPPED | OUTPATIENT
Start: 2025-03-21 | End: 2025-03-31

## 2025-03-21 RX ORDER — MUPIROCIN 20 MG/G
OINTMENT TOPICAL 3 TIMES DAILY
Qty: 30 G | Refills: 0 | Status: SHIPPED | OUTPATIENT
Start: 2025-03-21

## 2025-03-21 NOTE — PROGRESS NOTES
Subjective:       Patient ID: Lexi Amezquita is a 64 y.o. female.    Chief Complaint: right ankle pain due to scratch     HPI   63 y/o female patient with medical problems listed below presents for scar and pain on right posterior ankle for one week. Patient states she had a minor cut from scraping her right posterior ankle on a metal bar of a screen door. She had bleeding initially but was able to stop it. She used Neosporin topically. Her last Tdap vaccine was given in 2013. Denies fever or chills.    Problem List[1]     Review of patient's allergies indicates:   Allergen Reactions    Sulfa (sulfonamide antibiotics) Itching and Other (See Comments)     Hyperventilation    Keflex [cephalexin] Other (See Comments)     Abd pain     Past Surgical History:   Procedure Laterality Date    CATARACT EXTRACTION  11/19/12    right eye (toric)    CATARACT EXTRACTION W/  INTRAOCULAR LENS IMPLANT  11/26/12    left eye (toric)    EPIDURAL STEROID INJECTION INTO LUMBAR SPINE N/A 10/21/2021    Procedure: INJECTION, STEROID, SPINE, LUMBAR, EPIDURAL;  Surgeon: Marshall Lane MD;  Location: [facility] OR;  Service: Pain Management;  Laterality: N/A;  L5-S1    EXTRACORPOREAL SHOCK WAVE LITHOTRIPSY      EYE SURGERY      eyelid surgery    LITHOTRIPSY      REATTACHMENT OF MUSCLE Right 12/27/2019    Procedure: REATTACHMENT, MUSCLE;  Surgeon: Karoline Jaimes MD;  Location: [address];  Service: Ophthalmology;  Laterality: Right;    tonsillectomy      TRANSFORAMINAL EPIDURAL INJECTION OF STEROID Left 3/24/2020    Procedure: Injection,steroid,epidural,transforaminal approach;  Surgeon: Marshall Lane MD;  Location: [facility] OR;  Service: Pain Management;  Laterality: Left;  L4-5, L5-S1    TRANSFORAMINAL EPIDURAL INJECTION OF STEROID Left 7/28/2020    Procedure: INJECTION, STEROID, EPIDURAL, TRANSFORAMINAL APPROACH;  Surgeon: Marshall Lane MD;  Location: [facility] OR;  Service: Pain Management;  Laterality: Left;  L4-5, L5-S1  leave last on the  "schedule.  will not need to retest for covid.  Verify no fever/off antiinflammatories and no covid symptoms.      Current Medications[2]    Review of Systems   Constitutional:  Negative for chills and fever.   Respiratory:  Negative for cough and shortness of breath.    Cardiovascular:  Negative for chest pain and palpitations.   Gastrointestinal:  Negative for abdominal pain.   Skin:  Positive for color change and wound.   Neurological:  Negative for dizziness and headaches.       Objective:   /70 (BP Location: Left arm, Patient Position: Sitting)   Pulse 63   Temp 98.7 °F (37.1 °C) (Oral)   Ht 5' 4" (1.626 m)   Wt 65.2 kg (143 lb 11.8 oz)   SpO2 99%   BMI 24.67 kg/m²         Physical Exam  Constitutional:       General: She is not in acute distress.     Appearance: Normal appearance.   HENT:      Head: Atraumatic.   Cardiovascular:      Rate and Rhythm: Normal rate and regular rhythm.      Pulses: Normal pulses.      Heart sounds: Normal heart sounds.   Pulmonary:      Effort: Pulmonary effort is normal.      Breath sounds: Normal breath sounds.   Neurological:      Mental Status: She is alert.           Assessment:       1. Wound of skin    2. Redness of skin        Plan:       1. Redness of skin  - mupirocin (BACTROBAN) 2 % ointment; Apply topically 3 (three) times daily.  Dispense: 30 g; Refill: 0  - doxycycline (VIBRAMYCIN) 100 MG Cap; Take 1 capsule (100 mg total) by mouth every 12 (twelve) hours. for 10 days  Dispense: 20 capsule; Refill: 0    2. Wound of skin (Primary)  - Tdap Vaccine     Patient with be reevaluated in  follow up with pcp  or sooner silviano Celaya NP         [1]   Patient Active Problem List  Diagnosis    Insomnia    Anxiety    Migraines    DJD (degenerative joint disease) of knee    Nephrolithiasis    Hypothyroidism    Plantar fasciitis    Metatarsalgia    Equinus deformity of foot, acquired    Shoulder pain    Vitamin D deficiency    Irritable bowel syndrome without diarrhea    " Right shoulder pain    Shoulder stiffness, right    Shoulder weakness    Left shoulder pain    Shoulder stiffness, left    Ptosis    Lumbar radiculitis    DDD (degenerative disc disease), lumbar    Debility    Lichen sclerosus et atrophicus of the vulva   [2]   Current Outpatient Medications:     ALPRAZolam (XANAX) 0.25 MG tablet, Take 1 tablet (0.25 mg total) by mouth every evening., Disp: 30 tablet, Rfl: 5    cholecalciferol, vitamin D3, 5,000 unit Tab, Take 5,000 Units by mouth every other day., Disp: , Rfl:     cyclobenzaprine (FLEXERIL) 5 MG tablet, Take 1 tablet (5 mg total) by mouth as needed for Muscle spasms., Disp: 30 tablet, Rfl: 5    dicyclomine (BENTYL) 20 mg tablet, Take 1 tablet (20 mg total) by mouth every 8 (eight) hours as needed., Disp: 30 tablet, Rfl: 5    diphenhydrAMINE (BENADRYL) 25 mg capsule, Take 25 mg by mouth nightly as needed for Allergies or Insomnia., Disp: , Rfl:     estradioL (ESTRACE) 0.01 % (0.1 mg/gram) vaginal cream, Place 1 g vaginally twice a week., Disp: 42.5 g, Rfl: 0    estradioL (VAGIFEM) 10 mcg Tab, Place 1 tablet (10 mcg total) vaginally twice a week., Disp: 24 tablet, Rfl: 3    levothyroxine (SYNTHROID) 112 MCG tablet, Take 1 tablet (112 mcg total) by mouth before breakfast., Disp: 90 tablet, Rfl: 3    liothyronine (CYTOMEL) 5 MCG Tab, Take 1 tablet (5 mcg total) by mouth once daily., Disp: 90 tablet, Rfl: 3    metoprolol succinate (TOPROL-XL) 25 MG 24 hr tablet, TAKE 1 TABLET (25 MG TOTAL) BY MOUTH ONCE DAILY., Disp: 90 tablet, Rfl: 3    naproxen (NAPROSYN) 500 MG tablet, take 1 tablet by mouth twice a day with meals, Disp: 60 tablet, Rfl: 2    doxycycline (VIBRAMYCIN) 100 MG Cap, Take 1 capsule (100 mg total) by mouth every 12 (twelve) hours. for 10 days, Disp: 20 capsule, Rfl: 0    hydrocodone-chlorpheniramine (TUSSIONEX) 10-8 mg/5 mL suspension, Take 5 mLs by mouth every 12 (twelve) hours as needed for Cough. (Patient not taking: Reported on 12/4/2024), Disp: 70 mL,  Rfl: 0    mupirocin (BACTROBAN) 2 % ointment, Apply topically 3 (three) times daily., Disp: 30 g, Rfl: 0    promethazine (PHENERGAN) 25 MG tablet, Take 1 tablet (25 mg total) by mouth every 6 (six) hours as needed for Nausea. (Patient not taking: Reported on 3/21/2025), Disp: 20 tablet, Rfl: 1

## 2025-05-28 DIAGNOSIS — M25.511 RIGHT SHOULDER PAIN, UNSPECIFIED CHRONICITY: Primary | ICD-10-CM

## 2025-05-29 ENCOUNTER — HOSPITAL ENCOUNTER (OUTPATIENT)
Dept: RADIOLOGY | Facility: HOSPITAL | Age: 65
Discharge: HOME | End: 2025-05-29
Attending: FAMILY MEDICINE
Payer: MEDICARE

## 2025-05-29 ENCOUNTER — OFFICE VISIT (OUTPATIENT)
Dept: ORTHOPEDICS | Facility: CLINIC | Age: 65
End: 2025-05-29
Payer: MEDICARE

## 2025-05-29 VITALS — WEIGHT: 143.75 LBS | HEIGHT: 64 IN | BODY MASS INDEX: 24.54 KG/M2

## 2025-05-29 DIAGNOSIS — M75.31 CALCIFIC TENDINITIS OF RIGHT SHOULDER: Primary | ICD-10-CM

## 2025-05-29 DIAGNOSIS — M25.511 RIGHT SHOULDER PAIN, UNSPECIFIED CHRONICITY: ICD-10-CM

## 2025-05-29 PROCEDURE — 99213 OFFICE O/P EST LOW 20 MIN: CPT | Mod: PBBFAC,25,PO | Performed by: FAMILY MEDICINE

## 2025-05-29 PROCEDURE — 99999PBSHW PR PBB SHADOW TECHNICAL ONLY FILED TO HB: Mod: JZ,PBBFAC,,

## 2025-05-29 PROCEDURE — 73030 X-RAY EXAM OF SHOULDER: CPT | Mod: 26,RT,, | Performed by: RADIOLOGY

## 2025-05-29 PROCEDURE — 99999 PR PBB SHADOW E&M-EST. PATIENT-LVL III: CPT | Mod: PBBFAC,,, | Performed by: FAMILY MEDICINE

## 2025-05-29 PROCEDURE — 99214 OFFICE O/P EST MOD 30 MIN: CPT | Mod: 25,S$PBB,, | Performed by: FAMILY MEDICINE

## 2025-05-29 PROCEDURE — 73030 X-RAY EXAM OF SHOULDER: CPT | Mod: TC,PO,RT

## 2025-05-29 PROCEDURE — 20610 DRAIN/INJ JOINT/BURSA W/O US: CPT | Mod: PBBFAC,PO,RT | Performed by: FAMILY MEDICINE

## 2025-05-29 RX ORDER — METHYLPREDNISOLONE ACETATE 80 MG/ML
80 INJECTION, SUSPENSION INTRA-ARTICULAR; INTRALESIONAL; INTRAMUSCULAR; SOFT TISSUE
Status: DISCONTINUED | OUTPATIENT
Start: 2025-05-29 | End: 2025-05-29 | Stop reason: HOSPADM

## 2025-05-29 RX ADMIN — METHYLPREDNISOLONE ACETATE 80 MG: 80 INJECTION, SUSPENSION INTRA-ARTICULAR; INTRALESIONAL; INTRAMUSCULAR; SOFT TISSUE at 11:05

## 2025-05-29 NOTE — PROGRESS NOTES
Subjective     Patient ID: Lexi Amezquita is a 64 y.o. female.    Chief Complaint: Pain of the Right Shoulder    64-year-old female here today with complaints of right-sided shoulder pain.  She has had issues with both shoulders for many years now.  Was previously seen by Dr. Harmon for both shoulders.  Has had issues with right shoulder impingement and rotator cuff problems.  Last had a cortisone injection in his shoulder in 2018.  Usually we would do very well with cortisone injections and physical therapy.  He has not had a problem in his shoulder since then up until about two weeks ago when she thinks she slept on it wrong and also was lifting a lot of luggage while on a trip.  Was currently having issues with movement of the shoulder of any type.  Has not noticed any weakness or loss of range of motion.    Pain  Pertinent negatives include no chest pain, chills, congestion, coughing, headaches, rash or sore throat.       Review of Systems   Constitutional: Negative for chills and decreased appetite.   HENT:  Negative for congestion and sore throat.    Eyes:  Negative for blurred vision.   Cardiovascular:  Negative for chest pain, dyspnea on exertion and palpitations.   Respiratory:  Negative for cough and shortness of breath.    Skin:  Negative for rash.   Neurological:  Negative for difficulty with concentration, disturbances in coordination and headaches.   Psychiatric/Behavioral:  Negative for altered mental status, depression, hallucinations, memory loss and suicidal ideas.           Objective     General    Nursing note and vitals reviewed.  Constitutional: She is oriented to person, place, and time. She appears well-developed and well-nourished.   HENT:   Nose: Nose normal.   Eyes: EOM are normal. Pupils are equal, round, and reactive to light.   Neck: Neck supple.   Cardiovascular:  Normal rate.            Pulmonary/Chest: Effort normal.   Abdominal: Soft.   Neurological: She is alert and  oriented to person, place, and time. She has normal reflexes.   Psychiatric: She has a normal mood and affect. Her behavior is normal. Judgment and thought content normal.         Right Shoulder Exam     Inspection/Observation   Swelling: absent  Bruising: absent  Scars: absent  Deformity: absent  Scapular Winging: absent  Scapular Dyskinesia: negative    Tenderness   The patient is tender to palpation of the acromioclavicular joint and supraspinatus.    Range of Motion   Active abduction:  150   Passive abduction:  normal   Extension:  normal   Forward Flexion:  150   Forward Elevation: normal  Adduction: 90     Tests & Signs   Tao test: positive  Impingement: positive  Active Compression Test (Cameron's Sign): negative  Speed's Test: negative  Anterior Drawer Test: 0   Posterior Drawer Test: 0    Other   Sensation: normal    Left Shoulder Exam   Left shoulder exam is normal.       Muscle Strength   Right Upper Extremity   Shoulder Abduction: 5/5   Shoulder Internal Rotation: 5/5   Shoulder External Rotation: 5/5   Supraspinatus: 5/5   Subscapularis: 5/5   Biceps: 5/5     Vascular Exam     Right Pulses      Radial:                    2+      Physical Exam  Vitals and nursing note reviewed.   Constitutional:       Appearance: She is well-developed and well-nourished.   HENT:      Nose: Nose normal.   Eyes:      Extraocular Movements: EOM normal.      Pupils: Pupils are equal, round, and reactive to light.   Cardiovascular:      Rate and Rhythm: Normal rate.      Pulses:           Radial pulses are 2+ on the right side.   Pulmonary:      Effort: Pulmonary effort is normal.   Abdominal:      Palpations: Abdomen is soft.   Musculoskeletal:      Right shoulder: No swelling or deformity.      Cervical back: Normal range of motion and neck supple.   Neurological:      Mental Status: She is alert and oriented to person, place, and time.      Deep Tendon Reflexes: Reflexes are normal and symmetric.   Psychiatric:          Mood and Affect: Mood and affect normal.         Behavior: Behavior normal.         Thought Content: Thought content normal.         Judgment: Judgment normal.      X-ray images ordered obtained interpreted by me.  They show Well-preserved joint spacing of the glenohumeral joint.  There was a likely calcification at the humeral head which could be consistent with calcific tendinitis.  No acute fractures present.       Assessment and Plan     Encounter Diagnoses   Name Primary?    Right shoulder pain, unspecified chronicity     Calcific tendinitis of right shoulder Yes         Lexi was seen today for pain.    Diagnoses and all orders for this visit:    Calcific tendinitis of right shoulder    Right shoulder pain, unspecified chronicity      Discussed findings and treatment options with her today.  Going to give her a cortisone injection in the right shoulder.  We will also give her a home exercise program to do for rotator cuff strengthening.  Recommend following up here as needed.    Large Joint Aspiration/Injection: R subacromial bursa    Date/Time: 5/29/2025 11:00 AM    Performed by: Demetri Edward MD  Authorized by: Demetri Edward MD    Consent Done?:  Yes (Verbal)  Indications:  Arthritis and pain  Site marked: the procedure site was marked    Timeout: prior to procedure the correct patient, procedure, and site was verified    Prep: patient was prepped and draped in usual sterile fashion      Local anesthesia used?: Yes    Local anesthetic:  Lidocaine 1% without epinephrine and topical anesthetic  Anesthetic total (ml):  2      Details:  Needle Size:  22 G  Ultrasonic Guidance for needle placement?: No    Approach:  Posterior  Location:  Shoulder  Site:  R subacromial bursa  Medications:  80 mg methylPREDNISolone acetate 80 mg/mL  Patient tolerance:  Patient tolerated the procedure well with no immediate complications

## 2025-06-16 DIAGNOSIS — F51.01 PRIMARY INSOMNIA: ICD-10-CM

## 2025-06-16 DIAGNOSIS — F41.9 ANXIETY: ICD-10-CM

## 2025-06-16 NOTE — TELEPHONE ENCOUNTER
No care due was identified.  [facility] Embedded Care Due Messages. Reference number: [id number].   6/16/2025 5:28:26 PM CDT

## 2025-06-16 NOTE — TELEPHONE ENCOUNTER
No care due was identified.  [facility] Embedded Care Due Messages. Reference number: [id number].   6/16/2025 5:28:04 PM CDT

## 2025-06-17 RX ORDER — ALPRAZOLAM 0.25 MG/1
0.25 TABLET ORAL NIGHTLY
Qty: 30 TABLET | Refills: 5 | Status: SHIPPED | OUTPATIENT
Start: 2025-06-17

## 2025-06-17 RX ORDER — ALPRAZOLAM 0.25 MG/1
0.25 TABLET ORAL NIGHTLY
Qty: 30 TABLET | Refills: 5 | OUTPATIENT
Start: 2025-06-17

## 2025-07-30 ENCOUNTER — PATIENT MESSAGE (OUTPATIENT)
Dept: ENDOCRINOLOGY | Facility: CLINIC | Age: 65
End: 2025-07-30
Payer: MEDICARE

## 2025-07-31 DIAGNOSIS — E03.9 HYPOTHYROIDISM, UNSPECIFIED TYPE: Primary | ICD-10-CM

## 2025-08-01 ENCOUNTER — OFFICE VISIT (OUTPATIENT)
Dept: ENDOCRINOLOGY | Facility: CLINIC | Age: 65
End: 2025-08-01
Payer: MEDICARE

## 2025-08-01 ENCOUNTER — LAB VISIT (OUTPATIENT)
Dept: LAB | Facility: HOSPITAL | Age: 65
End: 2025-08-01
Attending: INTERNAL MEDICINE
Payer: MEDICARE

## 2025-08-01 DIAGNOSIS — H57.89 THYROID EYE DISEASE: ICD-10-CM

## 2025-08-01 DIAGNOSIS — E07.9 THYROID EYE DISEASE: ICD-10-CM

## 2025-08-01 DIAGNOSIS — E89.0 POSTABLATIVE HYPOTHYROIDISM: Primary | ICD-10-CM

## 2025-08-01 DIAGNOSIS — E03.9 HYPOTHYROIDISM, UNSPECIFIED TYPE: ICD-10-CM

## 2025-08-01 LAB
T3FREE SERPL-MCNC: 74 NG/DL (ref 60–180)
T4 FREE SERPL-MCNC: 0.98 NG/DL (ref 0.71–1.51)
TSH SERPL-ACNC: 4.27 UIU/ML (ref 0.4–4)

## 2025-08-01 PROCEDURE — 36415 COLL VENOUS BLD VENIPUNCTURE: CPT | Mod: PO

## 2025-08-01 PROCEDURE — 84480 ASSAY TRIIODOTHYRONINE (T3): CPT

## 2025-08-01 PROCEDURE — 84439 ASSAY OF FREE THYROXINE: CPT

## 2025-08-01 PROCEDURE — 84443 ASSAY THYROID STIM HORMONE: CPT

## 2025-08-01 NOTE — PROGRESS NOTES
The patient location is: [place]    Visit type: audiovisual    Face to Face time with patient: 12  14 minutes of total time spent on the encounter, which includes face to face time and non-face to face time preparing to see the patient (eg, review of tests), Obtaining and/or reviewing separately obtained history, Documenting clinical information in the electronic or other health record, Independently interpreting results (not separately reported) and communicating results to the patient/family/caregiver, or Care coordination (not separately reported).         Each patient to whom he or she provides medical services by telemedicine is:  (1) informed of the relationship between the physician and patient and the respective role of any other health care provider with respect to management of the patient; and (2) notified that he or she may decline to receive medical services by telemedicine and may withdraw from such care at any time.    Notes:               Notes:   CHIEF COMPLAINT: Hypothyroidism/status post Graves  64 y.o.  female here for followup.  History of radioactive iodine for hyperthyroidism.  Currently on Synthroid 112 mcg daily and Cytomel 5 daily. Has tried generic in the past and had difficulty tolerating.           THYROID DISORDER:  She is currently taking Synthroid 112 mcg and Cytomel 5 mcg. She was recently prescribed generic Euthyrox instead of brand Synthroid. She feels she experienced better symptom control with brand Synthroid, but Medicare will not cover the brand medication. She recently switched from [facility] pharmacy to a smaller pharmacy due to previous medication dispensing issues. She expresses some uncertainty about the generic medication but is continuing her current thyroid medication regimen.    OCULAR:  She reports ongoing eye problems with left eye related to thyroid condition and is currently seeing an ophthalmologist at [facility]. She describes persistent left eye dryness  that initially was worse in winter but now occurs year-round. Her left eye cr constantly, requiring frequent wiping of the eye corner. She notes her left eyelid feels like it is pulling down.She continues to follow up with her eye specialist to manage symptoms.    CARDIOVASCULAR:  She takes Metoprolol for premature ventricular contractions (PVCs) and reports that if she does not take the medication, the PVCs return. She denies worsening of PVCs and symptoms are well-controlled with medication. She does not check blood pressure at home. Historically, her blood pressure has been on the low side, averaging 110/60-68. She notes blood pressure was elevated during a visit to an orthopedist when experiencing significant pain.    ANXIETY:  She reports improvement in anxiety symptoms since medication change. She denies increased anxiety or jitteriness compared to baseline and notes that anxiety has been better since medication adjustment.    GASTROINTESTINAL:  She denies any consistent bowel changes from her baseline and reports no issues with diarrhea or constipation.    This note was generated with the assistance of ambient listening technology. Verbal consent was obtained by the patient and accompanying visitor(s) for the recording of patient appointment to facilitate this note. I attest to having reviewed and edited the generated note for accuracy, though some syntax or spelling errors may persist. Please contact the author of this note for any clarification.               PAST MEDICAL HISTORY: Graves treated with radioactive iodine in 2000. Also had Graves eye disease    PAST SURGICAL HISTORY: 3 surgeries for Graves eye disease, tonsillectomy, lithotripsy    SOCIAL HISTORY: Does not smoke or drink    FAMILY HISTORY: Hypothyroidism, heart disease, diabetes. Incidentally  had Graves' disease as well    MEDICATIONS/ALLERGIES: The patient's MedCard has been updated and reviewed.         PE: Virtual  Visit          ASSESSMENT/PLAN:  Assessment & Plan    E89.0 Postablative hypothyroidism  H57.89, E07.9 Thyroid eye disease    E89.0 POSTABLATIVE HYPOTHYROIDISM:  - Reviewed thyroid medication regimen, including Synthroid 112 mcg and Cytomel 5 mcg, noting generic thyroid medication (Euthyrox) instead of brand Synthroid.  - Explained that thyroid medication dosage may change periodically, regardless of brand or generic formulation.  - Continue Synthroid 112 mcg.  - Continue Cytomel 5 mcg.  - Awaiting results of recent thyroid labs done today to determine if any adjustments are needed.  - Reviewed recent labs order for thyroid levels (already completed by patient).  - BP historically on the lower side.  - Continue Metoprolol for PVCs.    H57.89, E07.9 THYROID EYE DISEASE:  - Patient to continue following up with ophthalmologist for thyroid-related eye symptoms.    PLAN SUMMARY:  - Continue Synthroid 112 mcg  - Continue Cytomel 5 mcg  - Continue Metoprolol for PVCs  - Awaiting results of recent thyroid labs to determine if adjustments are needed  - Continue follow-up with ophthalmologist for thyroid-related eye symptoms             FOLLOWUP: [facility]  F/U 1 year TSH, Ft4, T3

## 2025-08-04 ENCOUNTER — RESULTS FOLLOW-UP (OUTPATIENT)
Dept: ENDOCRINOLOGY | Facility: CLINIC | Age: 65
End: 2025-08-04
Payer: MEDICARE

## 2025-08-04 ENCOUNTER — PATIENT MESSAGE (OUTPATIENT)
Dept: ENDOCRINOLOGY | Facility: CLINIC | Age: 65
End: 2025-08-04
Payer: MEDICARE

## 2025-08-04 DIAGNOSIS — E89.0 POSTABLATIVE HYPOTHYROIDISM: Primary | ICD-10-CM

## 2025-08-04 RX ORDER — LEVOTHYROXINE SODIUM 125 UG/1
125 TABLET ORAL
Qty: 30 TABLET | Refills: 11 | Status: SHIPPED | OUTPATIENT
Start: 2025-08-04 | End: 2025-08-05 | Stop reason: SDUPTHER

## 2025-08-05 ENCOUNTER — TELEPHONE (OUTPATIENT)
Dept: ENDOCRINOLOGY | Facility: CLINIC | Age: 65
End: 2025-08-05
Payer: MEDICARE

## 2025-08-05 DIAGNOSIS — E89.0 POSTABLATIVE HYPOTHYROIDISM: Primary | ICD-10-CM

## 2025-08-05 RX ORDER — LEVOTHYROXINE SODIUM 112 UG/1
112 TABLET ORAL
Qty: 30 TABLET | Refills: 11 | Status: SHIPPED | OUTPATIENT
Start: 2025-08-05 | End: 2026-08-05

## 2025-08-05 RX ORDER — LEVOTHYROXINE SODIUM 125 UG/1
125 TABLET ORAL
Qty: 30 TABLET | Refills: 11 | Status: SHIPPED | OUTPATIENT
Start: 2025-08-05 | End: 2025-08-05

## 2025-08-05 NOTE — TELEPHONE ENCOUNTER
Copied from CRM #[id number]. Topic: General Inquiry - Return Call  >> Aug 5, 2025 12:09 PM Lorie wrote:  Type:  Patient Returning Call    Who Called: pt   Who Left Message for Patient: gardenia   Does the patient know what this is regarding?:   Would the patient rather a call back or a response via "[facility]?   Best Call Back Number:Telephone Information:  Mobile          [phone]      Additional Information:  pt is returning office call

## 2025-08-05 NOTE — TELEPHONE ENCOUNTER
Pt states she is not comfortable with increasing to 125 mcg, did not like the way she felt on that dose in the past. She wants to try Brand Synthroid 112 mcg. States the 112 mcg dose she was taking was not brand. Please advise.

## 2025-08-05 NOTE — TELEPHONE ENCOUNTER
Ok will send brand synthroid 112 mcg  It is okay for the TSH to be slightly elevated  Does not need repeat TSH since going back to the previous dose.  At this point, okay to follow up with PCP

## 2025-08-10 ENCOUNTER — PATIENT MESSAGE (OUTPATIENT)
Dept: FAMILY MEDICINE | Facility: CLINIC | Age: 65
End: 2025-08-10
Payer: MEDICARE

## (undated) DEVICE — SHEET EENT SPLIT

## (undated) DEVICE — GLOVE SURG ULTRA TOUCH 6

## (undated) DEVICE — GLOVE SURG ULTRA TOUCH 7.5

## (undated) DEVICE — SYR DISP LL 5CC

## (undated) DEVICE — CLEANER TIP ELECSURG 2X2IN

## (undated) DEVICE — CAUTERY HIGH TEMP 2IN FLEXIBLE

## (undated) DEVICE — GLOVE SURGEONS ULTRA TOUCH 6.5

## (undated) DEVICE — SOL BETADINE 5%

## (undated) DEVICE — SYR 10CC LUER LOCK

## (undated) DEVICE — SYS LABEL CORRECT MED

## (undated) DEVICE — TUBING MINIBORE EXTENSION

## (undated) DEVICE — TRAY MUSCLE LID EYE

## (undated) DEVICE — SEE MEDLINE ITEM 152622

## (undated) DEVICE — CUP MEDICINE STERILE 2OZ

## (undated) DEVICE — CHLORAPREP 10.5 ML APPLICATOR

## (undated) DEVICE — SKINMARKER & RULER REGULAR X-F

## (undated) DEVICE — PROTECTOR CORNEAL CROUCH ST

## (undated) DEVICE — SOL WATER STRL IRR 1000ML

## (undated) DEVICE — NDL HYPO A BEVEL 30X1/2

## (undated) DEVICE — SOL BSS BALANCED SALT

## (undated) DEVICE — INSTRUMENT SURG SUCT FRZR W/C

## (undated) DEVICE — PENCIL ROCKER SWITCH 10FT CORD

## (undated) DEVICE — NDL HYPODERMIC BLUNT 18G 1.5IN

## (undated) DEVICE — APPLICATOR STERILE 3IN

## (undated) DEVICE — DRAPE STERI-DRAPE 1000 17X11IN

## (undated) DEVICE — ELECTRODE REM PLYHSV RETURN 9

## (undated) DEVICE — NDL SAFETY 25G X 1.5 ECLIPSE

## (undated) DEVICE — NDL STRAIGHT 4CM LEIBINGER

## (undated) DEVICE — NDL SPINAL SPINOCAN 22GX3.5

## (undated) DEVICE — DROPPER MEDICINE

## (undated) DEVICE — GOWN SURGICAL X-LARGE

## (undated) DEVICE — GAUZE SPONGE 4X4 12PLY

## (undated) DEVICE — NDL 22GA X1 1/2 REG BEVEL

## (undated) DEVICE — DRAPE STERI INSTRUMENT 1018

## (undated) DEVICE — BLADE SURG #15 CARBON STEEL